# Patient Record
Sex: FEMALE | Race: WHITE | Employment: FULL TIME | URBAN - METROPOLITAN AREA
[De-identification: names, ages, dates, MRNs, and addresses within clinical notes are randomized per-mention and may not be internally consistent; named-entity substitution may affect disease eponyms.]

---
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
## 2021-11-11 ENCOUNTER — HOSPITAL ENCOUNTER (EMERGENCY)
Age: 21
Discharge: HOME | End: 2021-11-11
Attending: EMERGENCY MEDICINE

## 2021-11-11 ENCOUNTER — APPOINTMENT (OUTPATIENT)
Dept: GENERAL RADIOLOGY | Age: 21
End: 2021-11-11
Attending: PHYSICIAN ASSISTANT

## 2021-11-11 VITALS
SYSTOLIC BLOOD PRESSURE: 125 MMHG | HEART RATE: 87 BPM | TEMPERATURE: 98 F | RESPIRATION RATE: 16 BRPM | OXYGEN SATURATION: 100 % | DIASTOLIC BLOOD PRESSURE: 69 MMHG

## 2021-11-11 DIAGNOSIS — R07.89 CHEST WALL PAIN: ICD-10-CM
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
## 2021-11-11 LAB — HCG UR QL: NEGATIVE

## 2021-11-11 PROCEDURE — 99284 EMERGENCY DEPT VISIT MOD MDM: CPT

## 2021-11-11 PROCEDURE — 81025 URINE PREGNANCY TEST: CPT

## 2021-11-11 PROCEDURE — 93005 ELECTROCARDIOGRAM TRACING: CPT

## 2021-11-11 PROCEDURE — 71046 X-RAY EXAM CHEST 2 VIEWS: CPT

## 2021-11-11 RX ORDER — NAPROXEN 500 MG/1
500 TABLET ORAL
Qty: 20 TABLET | Refills: 0 | Status: SHIPPED | OUTPATIENT
Start: 2021-11-11
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
## 2021-11-12 LAB
ATRIAL RATE: 85 BPM
CALCULATED P AXIS, ECG09: 43 DEGREES
CALCULATED R AXIS, ECG10: 63 DEGREES
CALCULATED T AXIS, ECG11: 47 DEGREES
DIAGNOSIS, 93000: NORMAL
P-R INTERVAL, ECG05: 144 MS
Q-T INTERVAL, ECG07: 364 MS
QRS DURATION, ECG06: 86 MS
QTC CALCULATION (BEZET), ECG08: 433 MS
VENTRICULAR RATE, ECG03: 85 BPM

## 2021-11-21 ENCOUNTER — APPOINTMENT (OUTPATIENT)
Dept: GENERAL RADIOLOGY | Age: 21
End: 2021-11-21
Attending: EMERGENCY MEDICINE

## 2021-11-21 ENCOUNTER — HOSPITAL ENCOUNTER (EMERGENCY)
Age: 21
Discharge: HOME | End: 2021-11-21
Attending: EMERGENCY MEDICINE

## 2021-11-21 VITALS
HEIGHT: 61 IN | BODY MASS INDEX: 22.68 KG/M2 | DIASTOLIC BLOOD PRESSURE: 75 MMHG | SYSTOLIC BLOOD PRESSURE: 132 MMHG | OXYGEN SATURATION: 99 % | RESPIRATION RATE: 16 BRPM | TEMPERATURE: 98.6 F | WEIGHT: 120.15 LBS | HEART RATE: 73 BPM

## 2021-11-21 DIAGNOSIS — R07.89 CHEST WALL PAIN: Primary | ICD-10-CM

## 2021-11-21 DIAGNOSIS — R05.9 COUGH: ICD-10-CM

## 2021-11-21 PROCEDURE — 96374 THER/PROPH/DIAG INJ IV PUSH: CPT

## 2021-11-21 PROCEDURE — 99283 EMERGENCY DEPT VISIT LOW MDM: CPT

## 2021-11-21 PROCEDURE — 74011250637 HC RX REV CODE- 250/637: Performed by: EMERGENCY MEDICINE

## 2021-11-21 PROCEDURE — 71046 X-RAY EXAM CHEST 2 VIEWS: CPT

## 2021-11-21 PROCEDURE — 74011250636 HC RX REV CODE- 250/636: Performed by: EMERGENCY MEDICINE

## 2021-11-21 PROCEDURE — 93005 ELECTROCARDIOGRAM TRACING: CPT

## 2021-11-21 RX ORDER — AZITHROMYCIN 250 MG/1
TABLET, FILM COATED ORAL
Qty: 6 TABLET | Refills: 0 | Status: SHIPPED | OUTPATIENT
Start: 2021-11-21 | End: 2021-11-26

## 2021-11-21 RX ORDER — BENZONATATE 100 MG/1
100 CAPSULE ORAL
Qty: 15 CAPSULE | Refills: 0 | Status: SHIPPED | OUTPATIENT
Start: 2021-11-21 | End: 2021-11-26

## 2021-11-21 RX ORDER — ALBUTEROL SULFATE 90 UG/1
2 AEROSOL, METERED RESPIRATORY (INHALATION)
Qty: 18 G | Refills: 0 | Status: SHIPPED | OUTPATIENT
Start: 2021-11-21

## 2021-11-21 RX ORDER — BENZONATATE 100 MG/1
100 CAPSULE ORAL
Status: COMPLETED | OUTPATIENT
Start: 2021-11-21 | End: 2021-11-21

## 2021-11-21 RX ORDER — KETOROLAC TROMETHAMINE 30 MG/ML
15 INJECTION, SOLUTION INTRAMUSCULAR; INTRAVENOUS ONCE
Status: COMPLETED | OUTPATIENT
Start: 2021-11-21 | End: 2021-11-21

## 2021-11-21 RX ADMIN — KETOROLAC TROMETHAMINE 15 MG: 30 INJECTION, SOLUTION INTRAMUSCULAR; INTRAVENOUS at 21:53

## 2021-11-21 RX ADMIN — BENZONATATE 100 MG: 100 CAPSULE ORAL at 21:53

## 2021-11-22 LAB
ATRIAL RATE: 86 BPM
CALCULATED P AXIS, ECG09: 40 DEGREES
CALCULATED R AXIS, ECG10: 62 DEGREES
CALCULATED T AXIS, ECG11: 72 DEGREES
DIAGNOSIS, 93000: NORMAL
P-R INTERVAL, ECG05: 136 MS
Q-T INTERVAL, ECG07: 372 MS
QRS DURATION, ECG06: 82 MS
QTC CALCULATION (BEZET), ECG08: 445 MS
VENTRICULAR RATE, ECG03: 86 BPM
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
## 2021-11-22 NOTE — DISCHARGE INSTRUCTIONS
Chest x-ray today did not show pneumonia. Since you have had a cough for more than a week, please take Zithromax which is an antibiotic. Also use the albuterol inhaler as needed for wheezing. Thank you.
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
## 2021-12-16 ENCOUNTER — HOSPITAL ENCOUNTER (EMERGENCY)
Age: 21
Discharge: HOME | End: 2021-12-16
Attending: STUDENT IN AN ORGANIZED HEALTH CARE EDUCATION/TRAINING PROGRAM

## 2021-12-16 VITALS
TEMPERATURE: 98.2 F | HEIGHT: 61 IN | RESPIRATION RATE: 16 BRPM | HEART RATE: 97 BPM | SYSTOLIC BLOOD PRESSURE: 130 MMHG | OXYGEN SATURATION: 100 % | DIASTOLIC BLOOD PRESSURE: 86 MMHG | WEIGHT: 113 LBS | BODY MASS INDEX: 21.34 KG/M2

## 2021-12-16 DIAGNOSIS — Z72.0 TOBACCO ABUSE: ICD-10-CM

## 2021-12-16 DIAGNOSIS — I73.00 RAYNAUD'S DISEASE WITHOUT GANGRENE: ICD-10-CM

## 2021-12-16 DIAGNOSIS — I73.9 PERIPHERAL ARTERY VASOSPASM (HCC): Primary | ICD-10-CM

## 2021-12-16 PROCEDURE — 99282 EMERGENCY DEPT VISIT SF MDM: CPT

## 2021-12-16 NOTE — ED PROVIDER NOTES
22year-old with history of anxiety, ongoing tobacco abuse, presenting with tingling in her feet and hands. States that these symptoms have been ongoing for multiple years but she became more concerned today. States that she intermittently has tingling of her fingertips and toes, usually when exposed to cold. She denies persistent tingling. No pain. Denies palpitations, chest pain or lightheadedness. No fevers or chills. No trauma. Past Medical History:   Diagnosis Date    Concussion     Constipation     Dental disorder     Irritable bowel syndrome     Murmur     Psychiatric disorder     PTSD    Psychiatric disorder     Anxiety    Suicide attempt ([facility] [address]     Vision decreased        No past surgical history on file.       Family History:   Problem Relation Age of Onset    OSTEOARTHRITIS Maternal Grandmother     Headache Maternal Grandmother     Migraines Maternal Grandmother     Psychiatric Disorder Paternal Grandmother     Heart Disease Other     Alcohol abuse Other     Asthma Neg Hx     Bleeding Prob Neg Hx     Cancer Neg Hx     Diabetes Neg Hx     Elevated Lipids Neg Hx     Hypertension Neg Hx     Lung Disease Neg Hx     Stroke Neg Hx     Mental Retardation Neg Hx        Social History     Socioeconomic History    Marital status: SINGLE     Spouse name: Not on file    Number of children: Not on file    Years of education: Not on file    Highest education level: Not on file   Occupational History    Not on file   Tobacco Use    Smoking status: Current Every Day Smoker     Packs/day: 0.50    Smokeless tobacco: Former User   Substance and Sexual Activity    Alcohol use: No    Drug use: Yes     Types: Marijuana    Sexual activity: Not on file   Other Topics Concern    Not on file   Social History Narrative    Not on file     Social Determinants of Health     Financial Resource Strain:     Difficulty of Paying Living Expenses: Not on file   Food Insecurity:     Worried About Running Out of Food in the Last Year: Not on file    Ran Out of Food in the Last Year: Not on file   Transportation Needs:     Lack of Transportation (Medical): Not on file    Lack of Transportation (Non-Medical): Not on file   Physical Activity:     Days of Exercise per Week: Not on file    Minutes of Exercise per Session: Not on file   Stress:     Feeling of Stress : Not on file   Social Connections:     Frequency of Communication with Friends and Family: Not on file    Frequency of Social Gatherings with Friends and Family: Not on file    Attends Protestant Services: Not on file    Active Member of [address] [place] [address] [employer] or Organizations: Not on file    Attends Club or Organization Meetings: Not on file    Marital Status: Not on file   Intimate Partner Violence:     Fear of Current or Ex-Partner: Not on file    Emotionally Abused: Not on file    Physically Abused: Not on file    Sexually Abused: Not on file   Housing Stability:     Unable to Pay for Housing in the Last Year: Not on file    Number of [place] in the Last Year: Not on file    Unstable Housing in the Last Year: Not on file         ALLERGIES: Patient has no known allergies. Review of Systems   Constitutional: Negative for chills, fatigue and fever. Eyes: Negative for photophobia. Respiratory: Negative for cough and shortness of breath. Cardiovascular: Negative for chest pain and leg swelling. Gastrointestinal: Negative for abdominal pain, nausea and vomiting. Genitourinary: Negative for dysuria. Musculoskeletal: Negative for back pain. Neurological: Negative for light-headedness and headaches. Psychiatric/Behavioral: Negative for confusion. All other systems reviewed and are negative. Vitals:    12/16/21 1733   BP: 130/86   Pulse: 97   Resp: 16   Temp: 98.2 °F (36.8 °C)   SpO2: 100%   Weight: 51.3 kg (113 lb)   Height: 5' 1\" (1.549 m)            Physical Exam  Vitals and nursing note reviewed.    Constitutional: General: She is not in acute distress. Appearance: She is well-developed. She is not toxic-appearing. HENT:      Head: Normocephalic and atraumatic. Mouth/Throat:      Mouth: Mucous membranes are moist.      Pharynx: Oropharynx is clear. No oropharyngeal exudate. Eyes:      Extraocular Movements: Extraocular movements intact. Pupils: Pupils are equal, round, and reactive to light. Cardiovascular:      Rate and Rhythm: Normal rate and regular rhythm. Pulses:           Dorsalis pedis pulses are 2+ on the right side and 2+ on the left side. Posterior tibial pulses are 2+ on the right side and 2+ on the left side. Heart sounds: Normal heart sounds. Pulmonary:      Effort: Pulmonary effort is normal. No respiratory distress. Breath sounds: Normal breath sounds. Chest:      Chest wall: No tenderness. Abdominal:      General: There is no distension. Palpations: Abdomen is soft. Tenderness: There is no abdominal tenderness. There is no guarding or rebound. Musculoskeletal:         General: No deformity. Normal range of motion. Cervical back: Normal range of motion. Right lower leg: No edema. Left lower leg: No edema. Comments: Entire spine palpated, no tenderness or deformity. Skin:     General: Skin is warm and dry. Capillary Refill: Capillary refill takes less than 2 seconds. Neurological:      General: No focal deficit present. Mental Status: She is alert and oriented to person, place, and time. Cranial Nerves: Cranial nerves are intact. Motor: Motor function is intact. Coordination: Coordination is intact. Gait: Gait is intact.       Comments: 5/5 abduction of shoulders bilateral (C5,C6)  5/5 flexion (C5,C6) /extension (C7,8) at elbows bilaterally  5/5 wrist extension bilateral (C6)  5/5 wrist flexion bilateral (C7)  5/5  strength bilateral (C8)  5/5 abduction of fingers bilateral (C8,TI)  5/5 flexion/extension  hips bilateral  5/5 knee flexion/extension bilateral   5/5 foot flexion/extension bilateral        Psychiatric:         Mood and Affect: Mood normal.          MDM     . MEDICAL DECISION MAKIN y.o. female presents with Tingling    Differential diagnosis includes but not limited to: Raynaud's, possibly peripheral vasospasm or occlusion although normal exam and neurological exam    LABORATORY TESTS:  Labs Reviewed - No data to display    IMAGING RESULTS:  No orders to display       MEDICATIONS GIVEN:  Medications - No data to display      IMPRESSION:  1. Peripheral artery vasospasm (HCC)    2. Raynaud's disease without gangrene    3. Tobacco abuse        PLAN:  -   Discharge  Current Discharge Medication List        Follow-up Information     Follow up With Specialties Details Why Ricardo Fox MD Pediatric Medicine Schedule an appointment as soon as possible for a visit  Call for a follow up appointment. [address] [facility]  [phone]          Return precautions given      Tamara Ziegler MD          Please note that this dictation was completed with [employer], the computer voice recognition software. Quite often unanticipated grammatical, syntax, homophones, and other interpretive errors are inadvertently transcribed by the computer software. Please disregard these errors. Please excuse any errors that have escaped final proofreading.     Procedures

## 2021-12-16 NOTE — ED TRIAGE NOTES
Patient arrives reporting that for the past year she has had intermitent tingling and pain in both her hands and feet, worse at night and when its cold. Pt states it has been worsening over the past 3 days.

## 2022-12-31 NOTE — DISCHARGE INSTRUCTIONS
I have reviewed and confirmed nurses' notes... Metatarsal Fracture in Children: Care Instructions  Your Care Instructions    A metatarsal fracture is a break or a thin, hairline crack in one of the metatarsal bones of the foot. This type of fracture usually happens from repeated stress on the bones of the foot. Or it can happen when a person jumps or changes direction quickly and twists his or her foot or ankle the wrong way. This fracture is common among dancers because their work involves a lot of jumping, and balancing and turning on one foot. Treatment depends on how bad the fracture is and where the fracture is on the bone. Your child may or may not have had surgery. Your doctor may have put your child's foot in a cast or splint to keep it stable. Your child may have been given crutches to use to keep weight off his or her foot. A metatarsal fracture may take from 6 weeks to several months to heal. It is important to give your child's foot time to heal completely, so that he or she does not hurt it again. Do not let your child return to usual activities until your doctor says he or she can. Your doctor may suggest that your child get physical therapy to help regain strength and range of motion in the foot. Healthy habits can help your child heal. Give your child a variety of healthy foods. And don't smoke around him or her. Follow-up care is a key part of your child's treatment and safety. Be sure to make and go to all appointments, and call your doctor if your child is having problems. It's also a good idea to know your child's test results and keep a list of the medicines your child takes. How can you care for your child at home? · Be safe with medicines. Read and follow all instructions on the label. ¨ If your doctor gave your child a prescription medicine for pain, give it as prescribed. ¨ If your child is not taking a prescription pain medicine, ask your doctor if he or she can take an over-the-counter medicine.   · Follow your doctor's instructions about how much weight your child can put on the foot and when your child can go back to his or her usual activities. If your child was given crutches, be sure he or she uses them as directed. · Put ice or a cold pack on your child's foot for 10 to 20 minutes at a time. Try to do this every 1 to 2 hours for the next 3 days (when your child is awake) or until the swelling goes down. Put a thin cloth between the ice and your child's skin. · Prop up your child's foot on a pillow when you ice it or anytime your child sits or lies down for the next 3 days. Try to keep it above the level of your child's heart. This will help reduce swelling. Cast and splint care  · If your child's foot is in a cast or splint, follow the cast or splint care instructions your doctor gives you. If your child has a removable fiberglass walking cast or a splint, do not take it off unless your doctor tells you to. · Keep the cast or splint dry. If your child has a removable fiberglass walking cast or a splint, ask your doctor if it is okay to remove it to bathe. Your doctor may want your child to keep it on as much as possible. · If you are told to keep your child's cast or splint on, tape a sheet of plastic to cover it when he or she bathes. Or ask your doctor about products that can help keep a cast or splint dry. Water under the cast or splint can cause the skin to itch and hurt. · Never cut the cast or let your child stick anything down it to scratch an itch on the leg. When should you call for help? Call your doctor now or seek immediate medical care if:  · Your child has problems with his or her cast or splint. For example:  ¨ The skin under the cast or splint is burning or stinging. ¨ The cast or splint feels too tight. ¨ There is a lot of swelling near the cast or splint. (Some swelling is normal.)  ¨ Your child has a new fever. ¨ There is drainage or a bad smell coming from the cast or splint.   · Your child has increased or severe pain. · Your child has tingling, weakness, or numbness in the foot and toes. · Your child cannot move his or her toes. · Your child's foot turns cold or changes color. Watch closely for changes in your child's health, and be sure to contact your doctor if:  · The pain does not get better day by day. · Your child does not get better as expected. Where can you learn more? Go to http://gay-michael.info/. Enter T706 in the search box to learn more about \"Metatarsal Fracture in Children: Care Instructions. \"  Current as of: March 21, 2017  Content Version: 11.3  © 0218-9910 [employer]. Care instructions adapted under license by [employer] (which disclaims liability or warranty for this information). If you have questions about a medical condition or this instruction, always ask your healthcare professional. [address] any warranty or liability for your use of this information.

## 2023-05-22 RX ORDER — NAPROXEN 500 MG/1
500 TABLET ORAL
COMMUNITY
Start: 2021-11-11

## 2023-05-22 RX ORDER — ALBUTEROL SULFATE 90 UG/1
2 AEROSOL, METERED RESPIRATORY (INHALATION) EVERY 4 HOURS PRN
COMMUNITY
Start: 2021-11-21

## 2023-08-29 ENCOUNTER — OFFICE VISIT (OUTPATIENT)
Age: 23
End: 2023-08-29

## 2023-08-29 VITALS
SYSTOLIC BLOOD PRESSURE: 126 MMHG | BODY MASS INDEX: 24 KG/M2 | DIASTOLIC BLOOD PRESSURE: 78 MMHG | TEMPERATURE: 98.5 F | HEART RATE: 85 BPM | OXYGEN SATURATION: 96 % | WEIGHT: 127 LBS

## 2023-08-29 DIAGNOSIS — K59.00 CONSTIPATION, UNSPECIFIED CONSTIPATION TYPE: ICD-10-CM

## 2023-08-29 DIAGNOSIS — R39.9 UTI SYMPTOMS: Primary | ICD-10-CM

## 2023-08-29 DIAGNOSIS — R10.30 LOWER ABDOMINAL PAIN: ICD-10-CM

## 2023-08-29 LAB
BILIRUBIN, URINE, POC: NEGATIVE
BLOOD URINE, POC: NEGATIVE
GLUCOSE URINE, POC: NEGATIVE
KETONES, URINE, POC: NEGATIVE
LEUKOCYTE ESTERASE, URINE, POC: NEGATIVE
NITRITE, URINE, POC: NEGATIVE
PH, URINE, POC: 7.5 (ref 4.6–8)
PROTEIN,URINE, POC: NEGATIVE
SPECIFIC GRAVITY, URINE, POC: 1.02 (ref 1–1.03)
URINALYSIS CLARITY, POC: CLEAR
URINALYSIS COLOR, POC: YELLOW
UROBILINOGEN, POC: NORMAL

## 2023-08-29 ASSESSMENT — ENCOUNTER SYMPTOMS
CONSTIPATION: 1
ABDOMINAL PAIN: 1

## 2023-08-29 NOTE — PATIENT INSTRUCTIONS
Results for orders placed or performed in visit on 08/29/23   AMB POC URINALYSIS DIP STICK AUTO W/ MICRO   Result Value Ref Range    Color (UA POC) Yellow     Clarity (UA POC) Clear     Glucose, Urine, POC Negative Negative    Bilirubin, Urine, POC Negative Negative    Ketones, Urine, POC Negative Negative    Specific Gravity, Urine, POC 1.020 1.001 - 1.035    Blood (UA POC) Negative Negative    pH, Urine, POC 7.5 4.6 - 8.0    Protein, Urine, POC Negative Negative    Urobilinogen, POC 0.2 mg/dL     Nitrite, Urine, POC Negative Negative    Leukocyte Esterase, Urine, POC Negative Negative     High fiber diet and lots of water  Use Miralax powder 17 gm  8 Oz of water - may take daily x7 days and then alternate day     Add Senakot if no good BM in 1-2 days

## 2023-08-29 NOTE — PROGRESS NOTES
Normal appearance. She is not ill-appearing. Abdominal:      General: Abdomen is flat. Bowel sounds are normal. There is no distension. Palpations: Abdomen is soft. Tenderness: There is abdominal tenderness (mild on deep palpation - with fullness) in the right lower quadrant and suprapubic area. There is no right CVA tenderness, left CVA tenderness or guarding. Comments: Area of fullness in lower abdomen      Neurological:      Mental Status: She is alert. 1. UTI symptoms  -     AMB POC URINALYSIS DIP STICK AUTO W/ MICRO  2. Lower abdominal pain  Comments:  RLQ and Suprapubic pain   3. Constipation, unspecified constipation type     High fiber diet and lots of water  Use Miralax powder 17 gm  8 Oz of water - may take daily x 7 days and then alternate day     Add Senakot if no good BM in 1-2 days       Results for orders placed or performed in visit on 08/29/23   AMB POC URINALYSIS DIP STICK AUTO W/ MICRO   Result Value Ref Range    Color (UA POC) Yellow     Clarity (UA POC) Clear     Glucose, Urine, POC Negative Negative    Bilirubin, Urine, POC Negative Negative    Ketones, Urine, POC Negative Negative    Specific Gravity, Urine, POC 1.020 1.001 - 1.035    Blood (UA POC) Negative Negative    pH, Urine, POC 7.5 4.6 - 8.0    Protein, Urine, POC Negative Negative    Urobilinogen, POC 0.2 mg/dL     Nitrite, Urine, POC Negative Negative    Leukocyte Esterase, Urine, POC Negative Negative     The patients condition was discussed with the patient and they understand. The patient is to follow up with primary care doctor. If signs and symptoms become worse the pt is to go to the ER. The patient is to take medications as prescribed.

## 2024-02-12 ENCOUNTER — HOSPITAL ENCOUNTER (EMERGENCY)
Facility: HOSPITAL | Age: 24
Discharge: HOME | End: 2024-02-12
Attending: STUDENT IN AN ORGANIZED HEALTH CARE EDUCATION/TRAINING PROGRAM
Payer: MEDICAID

## 2024-02-12 VITALS
HEART RATE: 96 BPM | TEMPERATURE: 98.6 F | SYSTOLIC BLOOD PRESSURE: 131 MMHG | BODY MASS INDEX: 24.31 KG/M2 | OXYGEN SATURATION: 97 % | WEIGHT: 128.75 LBS | RESPIRATION RATE: 18 BRPM | HEIGHT: 61 IN | DIASTOLIC BLOOD PRESSURE: 84 MMHG

## 2024-02-12 DIAGNOSIS — F41.9 ANXIETY: Primary | ICD-10-CM

## 2024-02-12 PROCEDURE — 99283 EMERGENCY DEPT VISIT LOW MDM: CPT

## 2024-02-12 RX ORDER — HYDROXYZINE HYDROCHLORIDE 25 MG/1
25 TABLET, FILM COATED ORAL EVERY 8 HOURS PRN
Qty: 30 TABLET | Refills: 0 | Status: SHIPPED | OUTPATIENT
Start: 2024-02-12 | End: 2024-02-22

## 2024-02-12 NOTE — ED TRIAGE NOTES
Pt reports she has \"always been anxious her whole life\" and she saw a new therapist today for anxiety and was advised to come to the ED to anxiety medicine to hold her over until she can get an appointment with a PCP. Pt reports she was also just recently able to get medicaid. Pt denies SI or HI.

## 2024-02-12 NOTE — DISCHARGE INSTRUCTIONS
We are sending a prescription for Atarax/hydroxyzine to your pharmacy.  Please take as prescribed.  If symptoms worsen or new concerning symptoms arise, please report to the nearest emergency department.    Thank you for allowing us to provide you with medical care today.  We realize that you have many choices for your emergency care needs.  We thank you for choosing [facility].  Please choose us in the future for any continued health care needs.     The exam and treatment you received in the Emergency Department were for an emergent problem and are not intended as complete care. It is important that you follow up with a doctor, nurse practitioner, or physician's assistant for ongoing care. If your symptoms worsen or you do not improve as expected and you are unable to reach your usual health care provider, you should return to the Emergency Department.  We are available 24 hours a day.     Please make an appointment with your health care provider(s) for follow up of your Emergency Department visit.  Take this sheet with you when you go to your follow-up visit.

## 2024-02-12 NOTE — ED PROVIDER NOTES
[facility]  EMERGENCY DEPARTMENT ENCOUNTER      Pt Name: Kaylee Berrios  MRN: [id number]  Birthdate 2000  Date of evaluation: 2/12/2024  Provider: Heber Jordan PA-C    CHIEF COMPLAINT       Chief Complaint   Patient presents with    Anxiety         HISTORY OF PRESENT ILLNESS   (Location/Symptom, Timing/Onset, Context/Setting, Quality, Duration, Modifying Factors, Severity)  Note limiting factors.   23-year-old female reports to ED upon therapist referral for anxiety medicine until she can get in with a PCP.  Reports \"always being anxious her whole life,\" and saw her new therapist today.  Denies SI/HI, changes in her anxious state, shortness of breath, chest pain, or N/V/D.              Review of External Medical Records:     Nursing Notes were reviewed.    REVIEW OF SYSTEMS    (2-9 systems for level 4, 10 or more for level 5)     Review of Systems   Constitutional:  Negative for activity change and appetite change.   Gastrointestinal: Negative.    Neurological: Negative.    Psychiatric/Behavioral:  Negative for hallucinations.        Except as noted above the remainder of the review of systems was reviewed and negative.       PAST MEDICAL HISTORY     Past Medical History:   Diagnosis Date    Concussion     Constipation     Dental disorder     Irritable bowel syndrome     Murmur     Psychiatric disorder     Anxiety    Psychiatric disorder     PTSD    Suicide attempt (HCC)     Vision decreased          SURGICAL HISTORY     History reviewed. No pertinent surgical history.      CURRENT MEDICATIONS       Discharge Medication List as of 2/12/2024  6:20 PM        CONTINUE these medications which have NOT CHANGED    Details   albuterol sulfate HFA (PROVENTIL;VENTOLIN;PROAIR) 108 (90 Base) MCG/ACT inhaler Inhale 2 puffs into the lungs every 4 hours as neededHistorical Med      naproxen (NAPROSYN) 500 MG tablet Take 1 tablet by mouthHistorical Med             ALLERGIES     Patient has no known

## 2024-02-16 ENCOUNTER — HOSPITAL ENCOUNTER (EMERGENCY)
Facility: HOSPITAL | Age: 24
Discharge: HOME | End: 2024-02-16
Attending: EMERGENCY MEDICINE
Payer: MEDICAID

## 2024-02-16 ENCOUNTER — APPOINTMENT (OUTPATIENT)
Facility: HOSPITAL | Age: 24
End: 2024-02-16
Payer: MEDICAID

## 2024-02-16 VITALS
HEART RATE: 71 BPM | OXYGEN SATURATION: 96 % | TEMPERATURE: 98.1 F | RESPIRATION RATE: 18 BRPM | DIASTOLIC BLOOD PRESSURE: 94 MMHG | SYSTOLIC BLOOD PRESSURE: 137 MMHG

## 2024-02-16 DIAGNOSIS — R11.2 NAUSEA VOMITING AND DIARRHEA: Primary | ICD-10-CM

## 2024-02-16 DIAGNOSIS — R19.7 NAUSEA VOMITING AND DIARRHEA: Primary | ICD-10-CM

## 2024-02-16 LAB
ALBUMIN SERPL-MCNC: 4.5 G/DL (ref 3.5–5)
ALBUMIN/GLOB SERPL: 1.1 (ref 1.1–2.2)
ALP SERPL-CCNC: 43 U/L (ref 45–117)
ALT SERPL-CCNC: 15 U/L (ref 12–78)
ANION GAP SERPL CALC-SCNC: 15 MMOL/L (ref 5–15)
APPEARANCE UR: CLEAR
AST SERPL-CCNC: 15 U/L (ref 15–37)
BACTERIA URNS QL MICRO: ABNORMAL /HPF
BASOPHILS # BLD: 0 K/UL (ref 0–0.1)
BASOPHILS NFR BLD: 0 % (ref 0–1)
BILIRUB SERPL-MCNC: 0.8 MG/DL (ref 0.2–1)
BILIRUB UR QL: NEGATIVE
BUN SERPL-MCNC: 8 MG/DL (ref 6–20)
BUN/CREAT SERPL: 11 (ref 12–20)
CALCIUM SERPL-MCNC: 8.9 MG/DL (ref 8.5–10.1)
CHLORIDE SERPL-SCNC: 102 MMOL/L (ref 97–108)
CO2 SERPL-SCNC: 25 MMOL/L (ref 21–32)
COLOR UR: ABNORMAL
CREAT SERPL-MCNC: 0.72 MG/DL (ref 0.55–1.02)
DIFFERENTIAL METHOD BLD: ABNORMAL
EOSINOPHIL # BLD: 0.2 K/UL (ref 0–0.4)
EOSINOPHIL NFR BLD: 1 % (ref 0–7)
EPITH CASTS URNS QL MICRO: ABNORMAL /LPF
ERYTHROCYTE [DISTWIDTH] IN BLOOD BY AUTOMATED COUNT: 11.6 % (ref 11.5–14.5)
GLOBULIN SER CALC-MCNC: 4.1 G/DL (ref 2–4)
GLUCOSE SERPL-MCNC: 170 MG/DL (ref 65–100)
GLUCOSE UR STRIP.AUTO-MCNC: NEGATIVE MG/DL
HCG SERPL-ACNC: <1 MIU/ML (ref 0–6)
HCT VFR BLD AUTO: 41.9 % (ref 35–47)
HGB BLD-MCNC: 14 G/DL (ref 11.5–16)
HGB UR QL STRIP: ABNORMAL
IMM GRANULOCYTES # BLD AUTO: 0 K/UL
IMM GRANULOCYTES NFR BLD AUTO: 0 %
KETONES UR QL STRIP.AUTO: >80 MG/DL
LEUKOCYTE ESTERASE UR QL STRIP.AUTO: NEGATIVE
LIPASE SERPL-CCNC: 24 U/L (ref 13–75)
LYMPHOCYTES # BLD: 0.6 K/UL (ref 0.8–3.5)
LYMPHOCYTES NFR BLD: 3 % (ref 12–49)
MAGNESIUM SERPL-MCNC: 1.5 MG/DL (ref 1.6–2.4)
MCH RBC QN AUTO: 30.3 PG (ref 26–34)
MCHC RBC AUTO-ENTMCNC: 33.4 G/DL (ref 30–36.5)
MCV RBC AUTO: 90.7 FL (ref 80–99)
MONOCYTES # BLD: 1 K/UL (ref 0–1)
MONOCYTES NFR BLD: 5 % (ref 5–13)
MUCOUS THREADS URNS QL MICRO: ABNORMAL /LPF
NEUTS BAND NFR BLD MANUAL: 9 % (ref 0–6)
NEUTS SEG # BLD: 18.2 K/UL (ref 1.8–8)
NEUTS SEG NFR BLD: 82 % (ref 32–75)
NITRITE UR QL STRIP.AUTO: NEGATIVE
NRBC # BLD: 0 K/UL (ref 0–0.01)
NRBC BLD-RTO: 0 PER 100 WBC
PH UR STRIP: 5.5 (ref 5–8)
PLATELET # BLD AUTO: 338 K/UL (ref 150–400)
PMV BLD AUTO: 11.8 FL (ref 8.9–12.9)
POTASSIUM SERPL-SCNC: 3.4 MMOL/L (ref 3.5–5.1)
PROT SERPL-MCNC: 8.6 G/DL (ref 6.4–8.2)
PROT UR STRIP-MCNC: NEGATIVE MG/DL
RBC # BLD AUTO: 4.62 M/UL (ref 3.8–5.2)
RBC #/AREA URNS HPF: ABNORMAL /HPF (ref 0–5)
RBC MORPH BLD: ABNORMAL
SODIUM SERPL-SCNC: 142 MMOL/L (ref 136–145)
SP GR UR REFRACTOMETRY: >1.03 (ref 1–1.03)
URINE CULTURE IF INDICATED: ABNORMAL
UROBILINOGEN UR QL STRIP.AUTO: 0.2 EU/DL (ref 0.2–1)
WBC # BLD AUTO: 20 K/UL (ref 3.6–11)
WBC URNS QL MICRO: ABNORMAL /HPF (ref 0–4)

## 2024-02-16 PROCEDURE — 2580000003 HC RX 258

## 2024-02-16 PROCEDURE — 80053 COMPREHEN METABOLIC PANEL: CPT

## 2024-02-16 PROCEDURE — 83735 ASSAY OF MAGNESIUM: CPT

## 2024-02-16 PROCEDURE — 85025 COMPLETE CBC W/AUTO DIFF WBC: CPT

## 2024-02-16 PROCEDURE — 96375 TX/PRO/DX INJ NEW DRUG ADDON: CPT

## 2024-02-16 PROCEDURE — 81001 URINALYSIS AUTO W/SCOPE: CPT

## 2024-02-16 PROCEDURE — 84702 CHORIONIC GONADOTROPIN TEST: CPT

## 2024-02-16 PROCEDURE — 99285 EMERGENCY DEPT VISIT HI MDM: CPT

## 2024-02-16 PROCEDURE — 74177 CT ABD & PELVIS W/CONTRAST: CPT

## 2024-02-16 PROCEDURE — 96365 THER/PROPH/DIAG IV INF INIT: CPT

## 2024-02-16 PROCEDURE — 6360000002 HC RX W HCPCS

## 2024-02-16 PROCEDURE — 36415 COLL VENOUS BLD VENIPUNCTURE: CPT

## 2024-02-16 PROCEDURE — 83690 ASSAY OF LIPASE: CPT

## 2024-02-16 PROCEDURE — 6360000004 HC RX CONTRAST MEDICATION: Performed by: EMERGENCY MEDICINE

## 2024-02-16 PROCEDURE — 96361 HYDRATE IV INFUSION ADD-ON: CPT

## 2024-02-16 RX ORDER — MAGNESIUM SULFATE 1 G/100ML
1000 INJECTION INTRAVENOUS ONCE
Status: COMPLETED | OUTPATIENT
Start: 2024-02-16 | End: 2024-02-16

## 2024-02-16 RX ORDER — 0.9 % SODIUM CHLORIDE 0.9 %
1000 INTRAVENOUS SOLUTION INTRAVENOUS ONCE
Status: COMPLETED | OUTPATIENT
Start: 2024-02-16 | End: 2024-02-16

## 2024-02-16 RX ORDER — ONDANSETRON 4 MG/1
4 TABLET, ORALLY DISINTEGRATING ORAL 3 TIMES DAILY PRN
Qty: 21 TABLET | Refills: 0 | Status: SHIPPED | OUTPATIENT
Start: 2024-02-16

## 2024-02-16 RX ORDER — ONDANSETRON 2 MG/ML
4 INJECTION INTRAMUSCULAR; INTRAVENOUS
Status: COMPLETED | OUTPATIENT
Start: 2024-02-16 | End: 2024-02-16

## 2024-02-16 RX ORDER — DROPERIDOL 2.5 MG/ML
0.62 INJECTION, SOLUTION INTRAMUSCULAR; INTRAVENOUS EVERY 6 HOURS PRN
Status: DISCONTINUED | OUTPATIENT
Start: 2024-02-16 | End: 2024-02-16 | Stop reason: HOSPADM

## 2024-02-16 RX ADMIN — IOPAMIDOL 100 ML: 755 INJECTION, SOLUTION INTRAVENOUS at 19:06

## 2024-02-16 RX ADMIN — DROPERIDOL 0.62 MG: 2.5 INJECTION, SOLUTION INTRAMUSCULAR; INTRAVENOUS at 18:47

## 2024-02-16 RX ADMIN — SODIUM CHLORIDE 1000 ML: 9 INJECTION, SOLUTION INTRAVENOUS at 17:37

## 2024-02-16 RX ADMIN — ONDANSETRON 4 MG: 2 INJECTION INTRAMUSCULAR; INTRAVENOUS at 17:37

## 2024-02-16 RX ADMIN — MAGNESIUM SULFATE HEPTAHYDRATE 1000 MG: 1 INJECTION, SOLUTION INTRAVENOUS at 20:07

## 2024-02-16 NOTE — ED PROVIDER NOTES
[facility] EMERGENCY CTR  EMERGENCY DEPARTMENT ENCOUNTER      Pt Name: Kaylee Berrios  MRN: [id number]  Birthdate 2000  Date of evaluation: 2/16/2024  Provider: Doe Gomez PA-C    CHIEF COMPLAINT       Chief Complaint   Patient presents with    Nausea & Vomiting         HISTORY OF PRESENT ILLNESS   (Location/Symptom, Timing/Onset, Context/Setting, Quality, Duration, Modifying Factors, Severity)  Note limiting factors.   23-year-old female with a past medical history of anxiety presents with complaint of nausea, vomiting, and diarrhea that started today.  Patient states that she has had numerous episodes of vomiting today.  She has felt extremely nauseous.  Reports that she has felt very close to passing out numerous times today, however denies any syncopal episodes.  Diarrhea without blood.  No recent antibiotics.  Denies fever, chest pain, shortness of breath, urinary symptoms.  Denies alcohol use, however admits to every day marijuana use.            Review of External Medical Records:     Nursing Notes were reviewed.    REVIEW OF SYSTEMS    (2-9 systems for level 4, 10 or more for level 5)     Review of Systems    Except as noted above the remainder of the review of systems was reviewed and negative.       PAST MEDICAL HISTORY     Past Medical History:   Diagnosis Date    Concussion     Constipation     Dental disorder     Irritable bowel syndrome     Murmur     Psychiatric disorder     Anxiety    Psychiatric disorder     PTSD    Suicide attempt (HCC)     Vision decreased          SURGICAL HISTORY     History reviewed. No pertinent surgical history.      CURRENT MEDICATIONS       Previous Medications    ALBUTEROL SULFATE HFA (PROVENTIL;VENTOLIN;PROAIR) 108 (90 BASE) MCG/ACT INHALER    Inhale 2 puffs into the lungs every 4 hours as needed    HYDROXYZINE HCL (ATARAX) 25 MG TABLET    Take 1 tablet by mouth every 8 hours as needed for Itching    NAPROXEN (NAPROSYN) 500 MG TABLET    Take 1 tablet by mouth

## 2024-02-17 NOTE — ED NOTES
[facility] Nursing Note        Patient Name: Kaylee Berrios      : 2000             MRN: [id number]      Chief Complaint: Nausea & Vomiting      Admit Diagnosis: No admission diagnoses are documented for this encounter.      Surgery: * No surgery found *            MD/RN reviewed discharge instructions and options with patient. Patient verbalized understanding. RN reviewed discharge instructions using teach back method. Patient ambulatory to exit without difficulty and no acute signs of distress. No complaints or needs expressed at this time. Patient counseled on medications prescribed at discharge (If prescribed). Vital signs stable. Patient to follow up with PCP/Specialist on the next business day for appointment. All questions answered by ER RN.          Lines:        Vitals: Patient Vitals for the past 12 hrs:   Temp Pulse Resp BP SpO2   24 -- -- -- -- 96 %   24 2045 98.1 °F (36.7 °C) 71 18 (!) 137/94 99 %   24 -- -- -- 123/72 98 %   24 1645 -- -- -- -- 100 %   24 1630 98 °F (36.7 °C) 74 16 129/74 100 %         Signed by: Amador Lee RN, DINORAH, BSN, CMSRN                                              2024 at 9:08 PM

## 2024-02-26 ENCOUNTER — HOSPITAL ENCOUNTER (EMERGENCY)
Facility: HOSPITAL | Age: 24
Discharge: HOME | End: 2024-02-27
Attending: EMERGENCY MEDICINE
Payer: MEDICAID

## 2024-02-26 DIAGNOSIS — S09.90XA INJURY OF HEAD, INITIAL ENCOUNTER: Primary | ICD-10-CM

## 2024-02-26 DIAGNOSIS — F41.1 ANXIETY STATE: ICD-10-CM

## 2024-02-26 PROCEDURE — 99284 EMERGENCY DEPT VISIT MOD MDM: CPT

## 2024-02-26 ASSESSMENT — PAIN DESCRIPTION - LOCATION: LOCATION: HEAD

## 2024-02-26 ASSESSMENT — PAIN DESCRIPTION - ORIENTATION: ORIENTATION: RIGHT

## 2024-02-26 ASSESSMENT — PAIN DESCRIPTION - FREQUENCY: FREQUENCY: INTERMITTENT

## 2024-02-26 ASSESSMENT — PAIN SCALES - GENERAL: PAINLEVEL_OUTOF10: 2

## 2024-02-26 ASSESSMENT — PAIN DESCRIPTION - PAIN TYPE: TYPE: ACUTE PAIN

## 2024-02-26 ASSESSMENT — PAIN - FUNCTIONAL ASSESSMENT: PAIN_FUNCTIONAL_ASSESSMENT: 0-10

## 2024-02-26 ASSESSMENT — PAIN DESCRIPTION - DESCRIPTORS: DESCRIPTORS: ACHING

## 2024-02-27 ENCOUNTER — APPOINTMENT (OUTPATIENT)
Facility: HOSPITAL | Age: 24
End: 2024-02-27
Payer: MEDICAID

## 2024-02-27 VITALS
RESPIRATION RATE: 16 BRPM | WEIGHT: 120.37 LBS | BODY MASS INDEX: 22.73 KG/M2 | DIASTOLIC BLOOD PRESSURE: 92 MMHG | SYSTOLIC BLOOD PRESSURE: 122 MMHG | TEMPERATURE: 98.8 F | HEIGHT: 61 IN | HEART RATE: 94 BPM | OXYGEN SATURATION: 100 %

## 2024-02-27 VITALS
DIASTOLIC BLOOD PRESSURE: 95 MMHG | RESPIRATION RATE: 17 BRPM | HEART RATE: 85 BPM | SYSTOLIC BLOOD PRESSURE: 144 MMHG | TEMPERATURE: 98.6 F | HEIGHT: 61 IN | BODY MASS INDEX: 24.55 KG/M2 | WEIGHT: 130 LBS | OXYGEN SATURATION: 100 %

## 2024-02-27 DIAGNOSIS — R00.2 PALPITATIONS: ICD-10-CM

## 2024-02-27 DIAGNOSIS — Z76.0 ENCOUNTER FOR MEDICATION REFILL: ICD-10-CM

## 2024-02-27 DIAGNOSIS — F41.1 ANXIETY STATE: Primary | ICD-10-CM

## 2024-02-27 LAB — HCG UR QL: NEGATIVE

## 2024-02-27 PROCEDURE — 81025 URINE PREGNANCY TEST: CPT

## 2024-02-27 PROCEDURE — 70450 CT HEAD/BRAIN W/O DYE: CPT

## 2024-02-27 PROCEDURE — 99283 EMERGENCY DEPT VISIT LOW MDM: CPT

## 2024-02-27 RX ORDER — LORAZEPAM 0.5 MG/1
0.5 TABLET ORAL EVERY 8 HOURS PRN
Qty: 12 TABLET | Refills: 0 | Status: SHIPPED | OUTPATIENT
Start: 2024-02-27 | End: 2024-03-01

## 2024-02-27 RX ORDER — ALBUTEROL SULFATE 90 UG/1
2 AEROSOL, METERED RESPIRATORY (INHALATION) 4 TIMES DAILY PRN
Qty: 18 G | Refills: 0 | Status: SHIPPED | OUTPATIENT
Start: 2024-02-27

## 2024-02-27 ASSESSMENT — ENCOUNTER SYMPTOMS
DOUBLE VISION: 0
EYE DISCHARGE: 0
CHEST TIGHTNESS: 0
FACIAL SWELLING: 0
DIFFICULTY BREATHING: 0
BLURRED VISION: 0
VOMITING: 0
NAUSEA: 0
ABDOMINAL PAIN: 0
DIARRHEA: 0
BACK PAIN: 0
COUGH: 0
SORE THROAT: 0
SHORTNESS OF BREATH: 0
EYE PAIN: 0

## 2024-02-27 NOTE — ED TRIAGE NOTES
Triage note:patient has history anxiety and depression is having anxiety today.hit her head today and feeling nausea has not vomited.denies wanting to hurt herself or other.

## 2024-02-27 NOTE — ED PROVIDER NOTES
[facility] EMERGENCY CTR  EMERGENCY DEPARTMENT ENCOUNTER      Pt Name: Kaylee Berrios  MRN: [id number]  Birthdate 2000  Date of evaluation: 2/26/2024  Provider: Javid Petty MD    CHIEF COMPLAINT       Chief Complaint   Patient presents with    Head Injury         HISTORY OF PRESENT ILLNESS   (Location/Symptom, Timing/Onset, Context/Setting, Quality, Duration, Modifying Factors, Severity)  Note limiting factors.   23-year-old female with head injury earlier today.  She struck her self in the head a couple times while very anxious and upset.  She does not have suicidal or homicidal ideation.  She denies headache and some nausea.  She is otherwise stable in the ER.  No vision or hearing changes.      Head Injury  Location:  Frontal  Pain details:     Quality:  Aching and dull    Severity:  No pain    Timing:  Constant    Progression:  Worsening  Chronicity:  New  Relieved by:  Nothing  Worsened by:  Nothing  Ineffective treatments:  None tried  Associated symptoms: no blurred vision, no difficulty breathing, no disorientation, no double vision, no headaches, no hearing loss, no loss of consciousness, no memory loss, no nausea and no vomiting          Review of External Medical Records:     Nursing Notes were reviewed.    REVIEW OF SYSTEMS    (2-9 systems for level 4, 10 or more for level 5)     Review of Systems   Constitutional:  Negative for activity change, appetite change, chills and diaphoresis.   HENT:  Negative for congestion, ear pain, facial swelling, hearing loss and sore throat.    Eyes:  Negative for blurred vision, double vision, pain, discharge and visual disturbance.   Respiratory:  Negative for cough, chest tightness and shortness of breath.    Cardiovascular:  Negative for chest pain and palpitations.   Gastrointestinal:  Negative for abdominal pain, diarrhea, nausea and vomiting.   Endocrine: Negative for cold intolerance, heat intolerance, polydipsia and polyphagia.   Genitourinary:

## 2024-02-27 NOTE — ED PROVIDER NOTES
[facility] EMERGENCY DEPT  EMERGENCY DEPARTMENT ENCOUNTER       Pt Name: Kaylee Berrios  MRN: [id number]  Birthdate 2000  Date of evaluation: 2/27/2024  Provider: Lico Gramajo MD   PCP: No primary care provider on file.  Note Started: 4:02 PM EST 2/27/24     CHIEF COMPLAINT       Chief Complaint   Patient presents with    Mental Health Problem     Pt arrives ambulatory to triage, reports she has been SOB and having panic attacks with arm and hand numbness x 1 week. Pt also reports nausea at times. Reports she has had to call 911 while driving. Pt reports she takes Hydroxyzine PRN for anxiety. Coming to ED to see if she can be admitted for resources. Denies SI/HI        HISTORY OF PRESENT ILLNESS: 1 or more elements      History From: patient, History limited by: none     Kyalee Berrios is a 23 y.o. female with a history of anxiety presents the emergency department chief complaint of panic attack.      Patient reports she has a history of anxiety.  She reports she was seen in the [facility] department yesterday for this, saw [facility] and was discharged with resources.  Patient reports over the past month she has had increasing difficulty with anxiety and frequent panic attacks.  She reports she begins to feel anxiety associated with increased work of breathing and paresthesias in her bilateral arms with palpitations.  She reports the symptoms are becoming disruptive to her life.    Patient denies any suicidal or homicidal ideation.  She saw her therapist today.  She is scheduled to see a psychiatrist in the coming weeks.  She states all the symptoms feel similar to her panic attacks in the past.     Please See MDM for Additional Details of the HPI/PMH  Nursing Notes were all reviewed and agreed with or any disagreements were addressed in the HPI.     REVIEW OF SYSTEMS        Positives and Pertinent negatives as per HPI.    PAST HISTORY     Past Medical History:  Past Medical History:   Diagnosis Date

## 2024-02-27 NOTE — [facility] NOTE
[facility] Progress Note:    Out patient resources for PHP, Warm line, and [place] crisis faxed over to [facility] ED.   
and cooperative. No grounds to seek TDO/ECO during this time. Writer updated ED, Provider Malinda who is in agreement with disposition.     MIESHA Love, Supervisee in Social Work, [facility]-A/C

## 2024-02-27 NOTE — DISCHARGE INSTRUCTIONS
Mental Health Resources around [facility]   [address], [place], [phone] Crisis [phone]  *[address], [place]  [phone] or Crisis [phone] Toll free [phone]  [facility] [address] [place], [phone] or [phone] for [place] residents  [place]   [address], [phone] Crisis [phone]  [place]   [address] [place], [phone] Crisis [phone]  [facility] Multiple locations in [place], [facility] [phone]   [facility] [address] [place] [address] [phone] Crisis [phone]  [facility] [address], [phone]     Other Mental Health Providers with Sliding Scale or Low/No Cost services  [facility] [address] [place] [address] [phone]  [facility] [address] [place], [phone]  [facility] [address], [phone]  [facility] [address], [phone]  [facility] [address], [phone]  [facility] [address], [phone]  [facility] [phone]    Psychiatrists and Nurse Practitioners   (Most of these practices also have therapists)    *[facility] [address] [facility] [address] [place]  [address] [phone]  [facility] [address], [phone]  [facility] [address] [place] [address] [phone]  *[facility] [address] [place], [place]  [phone]  [facility] Locations [address] [phone], [place] [phone], [place] [phone], and [place] [phone]  [facility] [place] office call [phone] [facility]

## 2024-02-27 NOTE — ED NOTES
Pt discharged by [facility]. Discharge instructions discussed and pt given opportunity to ask questions. Pt ambulatory out of ED

## 2024-03-07 ENCOUNTER — HOSPITAL ENCOUNTER (EMERGENCY)
Facility: HOSPITAL | Age: 24
Discharge: HOME | End: 2024-03-07
Attending: STUDENT IN AN ORGANIZED HEALTH CARE EDUCATION/TRAINING PROGRAM
Payer: MEDICAID

## 2024-03-07 VITALS
WEIGHT: 120.37 LBS | OXYGEN SATURATION: 100 % | SYSTOLIC BLOOD PRESSURE: 135 MMHG | HEART RATE: 95 BPM | TEMPERATURE: 98.4 F | HEIGHT: 61 IN | BODY MASS INDEX: 22.73 KG/M2 | DIASTOLIC BLOOD PRESSURE: 58 MMHG | RESPIRATION RATE: 16 BRPM

## 2024-03-07 DIAGNOSIS — F41.1 ANXIETY STATE: Primary | ICD-10-CM

## 2024-03-07 LAB
ALBUMIN SERPL-MCNC: 4.1 G/DL (ref 3.5–5)
ALBUMIN/GLOB SERPL: 1.1 (ref 1.1–2.2)
ALP SERPL-CCNC: 39 U/L (ref 45–117)
ALT SERPL-CCNC: 13 U/L (ref 12–78)
ANION GAP SERPL CALC-SCNC: 11 MMOL/L (ref 5–15)
APAP SERPL-MCNC: <2 UG/ML (ref 10–30)
APPEARANCE UR: CLEAR
AST SERPL-CCNC: 10 U/L (ref 15–37)
BARBITURATES UR QL SCN: NEGATIVE
BASOPHILS # BLD: 0.1 K/UL (ref 0–0.1)
BASOPHILS NFR BLD: 1 % (ref 0–1)
BENZODIAZ UR QL: NEGATIVE
BILIRUB SERPL-MCNC: 0.6 MG/DL (ref 0.2–1)
BILIRUB UR QL: NEGATIVE
BUN/CREAT SERPL: 7 (ref 12–20)
CALCIUM SERPL-MCNC: 8.6 MG/DL (ref 8.5–10.1)
CANNABINOIDS UR QL SCN: NEGATIVE
CO2 SERPL-SCNC: 27 MMOL/L (ref 21–32)
COCAINE UR QL SCN: NEGATIVE
COLOR UR: ABNORMAL
CREAT SERPL-MCNC: 0.69 MG/DL (ref 0.55–1.02)
DIFFERENTIAL METHOD BLD: ABNORMAL
EKG ATRIAL RATE: 71 BPM
EKG DIAGNOSIS: NORMAL
EKG P AXIS: 47 DEGREES
EKG P-R INTERVAL: 154 MS
EKG Q-T INTERVAL: 378 MS
EKG QRS DURATION: 84 MS
EKG QTC CALCULATION (BAZETT): 410 MS
EKG R AXIS: 65 DEGREES
EKG T AXIS: 46 DEGREES
EKG VENTRICULAR RATE: 71 BPM
EOSINOPHIL # BLD: 0.1 K/UL (ref 0–0.4)
EOSINOPHIL NFR BLD: 2 % (ref 0–7)
EPITH CASTS URNS QL MICRO: ABNORMAL /LPF
ERYTHROCYTE [DISTWIDTH] IN BLOOD BY AUTOMATED COUNT: 11.4 % (ref 11.5–14.5)
ETHANOL SERPL-MCNC: <10 MG/DL (ref 0–0.08)
GLOBULIN SER CALC-MCNC: 3.9 G/DL (ref 2–4)
GLUCOSE UR STRIP.AUTO-MCNC: NEGATIVE MG/DL
HCG UR QL: NEGATIVE
HCT VFR BLD AUTO: 39.8 % (ref 35–47)
HGB BLD-MCNC: 13.5 G/DL (ref 11.5–16)
IMM GRANULOCYTES # BLD AUTO: 0 K/UL (ref 0–0.04)
KETONES UR QL STRIP.AUTO: NEGATIVE MG/DL
LEUKOCYTE ESTERASE UR QL STRIP.AUTO: ABNORMAL
LYMPHOCYTES # BLD: 1.6 K/UL (ref 0.8–3.5)
LYMPHOCYTES NFR BLD: 26 % (ref 12–49)
Lab: NORMAL
MAGNESIUM SERPL-MCNC: 1.7 MG/DL (ref 1.6–2.4)
MCH RBC QN AUTO: 30.3 PG (ref 26–34)
MCHC RBC AUTO-ENTMCNC: 33.9 G/DL (ref 30–36.5)
MCV RBC AUTO: 89.4 FL (ref 80–99)
MONOCYTES # BLD: 0.8 K/UL (ref 0–1)
MONOCYTES NFR BLD: 13 % (ref 5–13)
NEUTS SEG NFR BLD: 58 % (ref 32–75)
NITRITE UR QL STRIP.AUTO: NEGATIVE
NRBC # BLD: 0 K/UL (ref 0–0.01)
NRBC BLD-RTO: 0 PER 100 WBC
OPIATES UR QL: NEGATIVE
PCP UR QL: NEGATIVE
PH UR STRIP: 6.5 (ref 5–8)
PLATELET # BLD AUTO: 270 K/UL (ref 150–400)
PMV BLD AUTO: 11.2 FL (ref 8.9–12.9)
POTASSIUM SERPL-SCNC: 3.5 MMOL/L (ref 3.5–5.1)
PROT SERPL-MCNC: 8 G/DL (ref 6.4–8.2)
PROT UR STRIP-MCNC: NEGATIVE MG/DL
RBC # BLD AUTO: 4.45 M/UL (ref 3.8–5.2)
SALICYLATES SERPL-MCNC: <1.7 MG/DL (ref 2.8–20)
SARS-COV-2 RNA RESP QL NAA+PROBE: NOT DETECTED
SODIUM SERPL-SCNC: 141 MMOL/L (ref 136–145)
SOURCE: NORMAL
SP GR UR REFRACTOMETRY: ABNORMAL (ref 1–1.03)
SPECIMEN HOLD: NORMAL
UROBILINOGEN UR QL STRIP.AUTO: 0.2 EU/DL (ref 0.2–1)
WBC # BLD AUTO: 5.9 K/UL (ref 3.6–11)
WBC URNS QL MICRO: ABNORMAL /HPF (ref 0–4)

## 2024-03-07 PROCEDURE — 83735 ASSAY OF MAGNESIUM: CPT

## 2024-03-07 PROCEDURE — 93005 ELECTROCARDIOGRAM TRACING: CPT | Performed by: STUDENT IN AN ORGANIZED HEALTH CARE EDUCATION/TRAINING PROGRAM

## 2024-03-07 PROCEDURE — 87635 SARS-COV-2 COVID-19 AMP PRB: CPT

## 2024-03-07 PROCEDURE — 81025 URINE PREGNANCY TEST: CPT

## 2024-03-07 PROCEDURE — 81001 URINALYSIS AUTO W/SCOPE: CPT

## 2024-03-07 PROCEDURE — 80179 DRUG ASSAY SALICYLATE: CPT

## 2024-03-07 PROCEDURE — 80143 DRUG ASSAY ACETAMINOPHEN: CPT

## 2024-03-07 PROCEDURE — 80053 COMPREHEN METABOLIC PANEL: CPT

## 2024-03-07 PROCEDURE — 85025 COMPLETE CBC W/AUTO DIFF WBC: CPT

## 2024-03-07 PROCEDURE — 99284 EMERGENCY DEPT VISIT MOD MDM: CPT

## 2024-03-07 PROCEDURE — 80307 DRUG TEST PRSMV CHEM ANLYZR: CPT

## 2024-03-07 PROCEDURE — 6370000000 HC RX 637 (ALT 250 FOR IP): Performed by: STUDENT IN AN ORGANIZED HEALTH CARE EDUCATION/TRAINING PROGRAM

## 2024-03-07 PROCEDURE — 36415 COLL VENOUS BLD VENIPUNCTURE: CPT

## 2024-03-07 PROCEDURE — 82077 ASSAY SPEC XCP UR&BREATH IA: CPT

## 2024-03-07 RX ORDER — LORAZEPAM 0.5 MG/1
0.5 TABLET ORAL DAILY
COMMUNITY

## 2024-03-07 RX ORDER — CEPHALEXIN 500 MG/1
500 CAPSULE ORAL 2 TIMES DAILY
Qty: 14 CAPSULE | Refills: 0 | Status: SHIPPED | OUTPATIENT
Start: 2024-03-07 | End: 2024-03-14

## 2024-03-07 RX ORDER — LORAZEPAM 0.5 MG/1
0.5 TABLET ORAL ONCE
Status: COMPLETED | OUTPATIENT
Start: 2024-03-07 | End: 2024-03-07

## 2024-03-07 RX ORDER — HYDROXYZINE HYDROCHLORIDE 10 MG/1
10 TABLET, FILM COATED ORAL
COMMUNITY

## 2024-03-07 RX ADMIN — LORAZEPAM 0.5 MG: 0.5 TABLET ORAL at 03:32

## 2024-03-07 ASSESSMENT — PAIN DESCRIPTION - FREQUENCY: FREQUENCY: CONTINUOUS

## 2024-03-07 ASSESSMENT — PAIN - FUNCTIONAL ASSESSMENT: PAIN_FUNCTIONAL_ASSESSMENT: NONE - DENIES PAIN

## 2024-03-07 NOTE — ED PROVIDER NOTES
EMERGENCY DEPARTMENT PHYSICIAN NOTE     Patient: Kaylee Berrios     Time of Service: 3/7/2024  3:08 AM     Chief complaint:   Chief Complaint   Patient presents with    Panic Attack        HISTORY:  Patient is a 23 y.o. female who presents to the emergency department with complaints of severe incapacitating anxiety.  Patient has had several ED visits over the last month and in the past for anxiety.  Most recently patient was at an outside ED and she was evaluated and given Ativan that has helped somewhat with her symptoms however patient cannot sit still.  She reports tachycardia and heart racing as well as numbness in her hands and arms when she becomes severely agitated and anxious.  She is awake and alert no acute distress.  Patient denies any true HI or SI.  She does state that she is hit herself in the head before.  Patient is frustrated as she was trying to get in to a outpatient facility and they said she had a space today but when time passed by they called back and said they no longer headspace for her.  Patient states she cannot live like this.  She had an appointment on the 18th of this month with psychiatry but they said that the psychiatrist has a family emergency and cannot see her until next month.  Patient states she is called every number she has on previous resources from [facility] to try and get help but she has been unable to get any help.  Patient is at risk for decompensation and harm based on her continued untreated anxiety and inability to get any true appropriate help besides emergency visits.  Vital signs stable.  Patient afebrile      Past Medical History:   Diagnosis Date    Concussion     Constipation     Dental disorder     Irritable bowel syndrome     Murmur     Psychiatric disorder     Anxiety    Psychiatric disorder     PTSD    Suicide attempt (HCC)     Vision decreased         History reviewed. No pertinent surgical history.     Family History   Problem Relation Age of Onset

## 2024-03-07 NOTE — ED TRIAGE NOTES
Triage note:anxiety attack this am history of anxiety would like to talk to behavorial health.denies any thoughts of self harm but did hit herself in the head 5 days ago.

## 2024-03-07 NOTE — DISCHARGE INSTRUCTIONS
You presented to ED with severe anxiety requesting hospital admission to a psychiatric unit.  You denied any suicidal thoughts or homicidal thoughts.  Based on your severe symptoms we consulted [facility] and attempt to get inpatient voluntary placement in a psychiatric unit for your anxiety.

## 2024-03-07 NOTE — ED NOTES
Patient stated she had to go home and could not wait to talk to [facility] left ambulatory waiting for ride in waiting room.

## 2024-03-13 ENCOUNTER — HOSPITAL ENCOUNTER (OUTPATIENT)
Facility: HOSPITAL | Age: 24
Setting detail: RECURRING SERIES
Discharge: HOME | End: 2024-03-16
Payer: MEDICAID

## 2024-03-13 VITALS
TEMPERATURE: 97.6 F | OXYGEN SATURATION: 98 % | SYSTOLIC BLOOD PRESSURE: 139 MMHG | HEART RATE: 107 BPM | DIASTOLIC BLOOD PRESSURE: 96 MMHG

## 2024-03-13 PROCEDURE — 90853 GROUP PSYCHOTHERAPY: CPT

## 2024-03-13 ASSESSMENT — ANXIETY QUESTIONNAIRES
5. BEING SO RESTLESS THAT IT IS HARD TO SIT STILL: 3
2. NOT BEING ABLE TO STOP OR CONTROL WORRYING: 3
7. FEELING AFRAID AS IF SOMETHING AWFUL MIGHT HAPPEN: 3
6. BECOMING EASILY ANNOYED OR IRRITABLE: 3
GAD7 TOTAL SCORE: 21
1. FEELING NERVOUS, ANXIOUS, OR ON EDGE: 3
4. TROUBLE RELAXING: 3
3. WORRYING TOO MUCH ABOUT DIFFERENT THINGS: 3

## 2024-03-13 ASSESSMENT — PATIENT HEALTH QUESTIONNAIRE - PHQ9
SUM OF ALL RESPONSES TO PHQ QUESTIONS 1-9: 13
SUM OF ALL RESPONSES TO PHQ QUESTIONS 1-9: 13
5. POOR APPETITE OR OVEREATING: 0
SUM OF ALL RESPONSES TO PHQ9 QUESTIONS 1 & 2: 3
SUM OF ALL RESPONSES TO PHQ QUESTIONS 1-9: 13
SUM OF ALL RESPONSES TO PHQ QUESTIONS 1-9: 13
3. TROUBLE FALLING OR STAYING ASLEEP: 3
8. MOVING OR SPEAKING SO SLOWLY THAT OTHER PEOPLE COULD HAVE NOTICED. OR THE OPPOSITE, BEING SO FIGETY OR RESTLESS THAT YOU HAVE BEEN MOVING AROUND A LOT MORE THAN USUAL: 0
6. FEELING BAD ABOUT YOURSELF - OR THAT YOU ARE A FAILURE OR HAVE LET YOURSELF OR YOUR FAMILY DOWN: 3
4. FEELING TIRED OR HAVING LITTLE ENERGY: 2
9. THOUGHTS THAT YOU WOULD BE BETTER OFF DEAD, OR OF HURTING YOURSELF: 0
7. TROUBLE CONCENTRATING ON THINGS, SUCH AS READING THE NEWSPAPER OR WATCHING TELEVISION: 2
1. LITTLE INTEREST OR PLEASURE IN DOING THINGS: 1
2. FEELING DOWN, DEPRESSED OR HOPELESS: 2
10. IF YOU CHECKED OFF ANY PROBLEMS, HOW DIFFICULT HAVE THESE PROBLEMS MADE IT FOR YOU TO DO YOUR WORK, TAKE CARE OF THINGS AT HOME, OR GET ALONG WITH OTHER PEOPLE: 1

## 2024-03-13 NOTE — SUICIDE SAFETY PLAN
SAFETY PLAN    A suicide Safety Plan is a document that supports someone when they are having thoughts of suicide.    Warning Signs that indicate a suicidal crisis may be developing: What (situations, thoughts, feelings, body sensations, behaviors, etc.) do you experience that lets you know you are beginning to think about suicide?  1. Racing heart,  2. Racing thoughts  3. Shortness of breath & numb hands    Internal Coping Strategies:  What things can I do (relaxation techniques, hobbies, physical activities, etc.) to take my mind off my problems without contacting another person?  1. Leave the situation  2. Smoke a cigarette  3. Hike or go to the [employer]    People and social settings that provide distraction: Who can I call or where can I go to distract me?  1. Name: Madison ceja)   Phone: [phone]  2. Name: Jose   Phone: In Phone   3. Place: [email or web address]            4. Place: Friends house  5. Place: [place]    People whom I can ask for help: Who can I call when I need help - for example, friends, family, clergy, someone else?  1. Name: Madison ceja)  Phone: [phone]  2. Name: Jose   Phone: In Phone   3. Name: Kimberly   Phone: [phone]    Professionals or Behavioral Health agencies I can contact during a crisis: Who can I call for help - for example, my doctor, my psychiatrist, my psychologist, a mental health provider, a suicide hotline?  1. Clinician Name: Sal Capellan   Phone: [phone]      Clinician Pager or Emergency Contact #: [phone]    2. Clinician Name: Johnny YUSUF   Phone: [phone]      Clinician Pager or Emergency Contact #: [phone]      3. Suicide Prevention Lifeline: 3-062-408-TALK (7367)    4. Local Behavioral Health Emergency Services -  for example, [facility], [facility] suicide hotline, [facility] Hotline: 988      Emergency Services Address: [address] [place] [address]       Emergency Services Phone: [phone]      Making

## 2024-03-13 NOTE — GROUP NOTE
Group Therapy Note    Date: 3/13/2024    Group Start Time: 12:00 PM  Group End Time:  1:00 PM  Group Topic: Psychotherapy    [facility] Joselyn Beard        Group Therapy Note    In the psychotherapy group, this writer presented on the process of grief, describing the normal grief process of acute grief escalating to integrated grief as well as complicated grief. This writer also reviewed the stages of grief including denial, anger, bargaining, depression and acceptance. This writer asked patients to reflect on their experience with the grief process and the different stages of grief. This writer supported patient processing through the use of CBT and strengths-based theory.     Attendees: 7        Engage in discussion on the grief     Notes:  The pt was present and engaged in the psychotherapy group, sharing her feelings with anger after the death of her best friend. This pt shared how she would become angry when she would be unable to same the share major life experiences as her. The pt will continue to process their experiences with grief in the future psychotherapy groups.    Status After Intervention:  Unchanged    Participation Level: Active Listener and Interactive    Participation Quality: Appropriate, Attentive, and Sharing      Speech:  normal      Thought Process/Content: Logical      Affective Functioning: Congruent      Mood: euthymic      Level of consciousness:  Attentive      Response to Learning: Able to verbalize current knowledge/experience, Able to verbalize/acknowledge new learning, Able to retain information, Capable of insight, and Progressing to goal      Endings: None Reported    Modes of Intervention: Education, Support, and Exploration      Discipline Responsible: /Counselor      Signature:  Joselyn Damian

## 2024-03-13 NOTE — BH NOTE
Writer called Dr. Nieto at 11:03 to inform him of pt's elevated pulse and BP. Dr. Nieto advised pt would be okay with no follow up and stated he would be in later today to meet with patients.    MIESHA Garrison  
Marital/Intimacy/Family [] Financial [x] Safety [x] Spiritual  [] Vocational/Academic [] Social [] Health [x] Recreational/Leisure    DISPOSITION    [x] Admitted to Program  [] Referred Elsewhere  [] Placed on Wait list    [] Other (comment)    Sal Capellan LCSW (social work)  3/13/2024 9:14 AM

## 2024-03-13 NOTE — GROUP NOTE
Group Therapy Note    Date: 3/13/2024    Group Start Time:  2:00 PM  Group End Time:  2:45 PM  Group Topic: Wrap-Up    [facility] Joselyn Beard        Group Therapy Note    In the wrap-up group, this writer had patients complete their behavioral check-in sheets to review any feelings of SI/HI. The writer then gathered patients outdoors to celebrate a graduating member. This writer also had patients briefly reflect on gratitude within their journals during this time. This group was co-facilitated by behavioral therapist Carina Garcia, who played a song during this time that was created for the graduating member by another patient. This facilitator asked patients to share their thoughts on the song and supported pt processing.     Attendees: 7       Patient's Goal:  Engage in [employer] activity for group member    Notes:  This pt was present and engaged in the wrap-up group, identifying no feelings of SI/HI on their behavioral wrap-up sheet. The pt was not talkative in the wrap-up group, however was engaged in journaling and supported group members during discussion. This pt will continue to support graduating group members in the future wrap-up groups.     Status After Intervention:  Unchanged    Participation Level: Active Listener and Interactive    Participation Quality: Appropriate and Attentive      Speech:  normal      Thought Process/Content: Logical      Affective Functioning: Congruent      Mood: euthymic      Level of consciousness:  Attentive      Response to Learning: Able to verbalize current knowledge/experience and Progressing to goal      Endings: None Reported    Modes of Intervention: Support and Activity      Discipline Responsible: /Counselor      Signature:  Joselyn Damian

## 2024-03-13 NOTE — GROUP NOTE
Group Therapy Note    Date: 3/13/2024    Group Start Time:  1:10 PM  Group End Time:  2:00 PM  Group Topic: Psychoeducation    [facility]    Nayana Barrett MSW        Group Therapy Note    Writer facilitated psychoeducation group. Writer continued education on relationship red and green flags. Writer supported engagement through encouraging pts to ask questions and identify what about the information stood out to them. Writer transitioned to supporting Ellen, a  from [facility], in presenting to the group. Writer asked follow up questions to support further learning from Ellen.    Attendees: 7       Patient's Goal:  learning and processing relationship red and green flags as well as engaging with the .    Notes:   Pt presented as attentive and tired as evidenced by body and facial language. Pt presented with appropriate eye contact. Pt interacted appropriately with the presenter. Pt will continue to work on learning and processing relationship red and green flags as well as engaging with the .    Status After Intervention:  Unchanged    Participation Level: Minimal    Participation Quality: Appropriate and Attentive      Speech:  normal      Thought Process/Content: Logical      Affective Functioning: Congruent      Mood: euthymic      Level of consciousness:  Oriented x4, Attentive, and Drowsy      Response to Learning: Able to verbalize current knowledge/experience and Progressing to goal      Endings: None Reported    Modes of Intervention: Education and Support      Discipline Responsible: /Counselor      Signature:  MIESHA RAMÍREZ

## 2024-03-13 NOTE — GROUP NOTE
Group Therapy Note    Date: 3/13/2024    Group Start Time: 10:40 AM  Group End Time: 11:30 AM  Group Topic: Cognitive Skills    [facility]    Sal Capellan LCSW        Group Therapy Note    The patient's were encouraged to participate in an ice breaker activity to welcome a new peer. The patients were encouraged to be attentive to educational materials on identifying red flags and green lights in relationships. The patients were encouraged to discuss their experiences in with red flags and green lights.     Attendees: 7       Patient's Goal: discuss red flags and green lights in relationships.       Notes: The patient participated in an ice breaker activity to welcome a new peer. The patient was attentive when reviewing educational materials on red flags and triggers. The patient participated in a discussion relating educational materials to personal life. The patient identified lack of reciprocity as a red flag in a relationship. The patient will continue to be open to educational materials during the cognitive skills group.     Status After Intervention:  Unchanged    Participation Level: Active Listener and Interactive    Participation Quality: Appropriate, Attentive, and Sharing      Speech:  normal      Thought Process/Content: Logical      Affective Functioning: Congruent      Mood: euthymic      Level of consciousness:  Alert, Oriented x4, and Attentive      Response to Learning: Able to verbalize current knowledge/experience and Capable of insight      Endings: None Reported    Modes of Intervention: Education and Activity      Discipline Responsible: /Counselor      Signature:  Sal Capellan LCSW

## 2024-03-14 ENCOUNTER — HOSPITAL ENCOUNTER (OUTPATIENT)
Facility: HOSPITAL | Age: 24
Setting detail: RECURRING SERIES
Discharge: HOME | End: 2024-03-17
Payer: MEDICAID

## 2024-03-14 VITALS
TEMPERATURE: 98.2 F | SYSTOLIC BLOOD PRESSURE: 123 MMHG | OXYGEN SATURATION: 100 % | DIASTOLIC BLOOD PRESSURE: 83 MMHG | HEART RATE: 101 BPM

## 2024-03-14 PROCEDURE — 90853 GROUP PSYCHOTHERAPY: CPT

## 2024-03-14 PROCEDURE — 90832 PSYTX W PT 30 MINUTES: CPT

## 2024-03-14 NOTE — GROUP NOTE
Group Therapy Note    Date: 3/14/2024    Group Start Time: 10:40 AM  Group End Time: 11:30 AM  Group Topic: Cognitive Skills    [facility]    Nayana Barrett MSW        Group Therapy Note    Writer facilitated cognitive skills group on automatic negative thoughts. Writer provided education on negative core beliefs and how they cause ANTs. Writer provided education on different treatments to address negative core beliefs and ANTs. Writer reviewed handout with pts on different ANTs and encouraged engagement through reflection questions and prompting pts to read from the handout.    Attendees: 5       Patient's Goal: na    Notes:  Pt was absent due to being pulled for individual    Status After Intervention:  na    Participation Level: None    Participation Quality: na      Speech:  na      Thought Process/Content: na      Affective Functioning: na      Mood: na      Level of consciousness:  na      Response to Learning: na      Endings: None reported    Modes of Intervention: na      Discipline Responsible: /Counselor      Signature:  MIESHA RAMÍREZ

## 2024-03-14 NOTE — GROUP NOTE
Group Therapy Note    Date: 3/14/2024    Group Start Time:  9:00 AM  Group End Time:  9:40 AM  Group Topic: Community Meeting    [facility]    Sal Capellan LCSW        Group Therapy Note    The patients were encouraged to complete a morning check in to assess mood and identify any safety issues. The patients were encouraged to reflect on previous evening and self-disclose.     Attendees: 6       Patient's Goal: Identify mood and discuss assessment         Notes: The patient completed the morning assessment and denied SI/HI. The patient allowed this writer to discuss answers to questions on their check in assessment to discuss. The patient shared that she is working on managing her anxiety. The patient will continue to identify mood and self-disclose in the community group.      Status After Intervention:  Unchanged    Participation Level: Active Listener and Interactive    Participation Quality: Appropriate, Attentive, Sharing, and Supportive      Speech:  normal      Thought Process/Content: Logical      Affective Functioning: Congruent      Mood: euthymic      Level of consciousness:  Alert, Oriented x4, and Attentive      Response to Learning: Able to verbalize current knowledge/experience, Capable of insight, and Progressing to goal      Endings: None Reported    Modes of Intervention: Support, Socialization, and Activity      Discipline Responsible: /Counselor      Signature:  Sal Capellan LCSW

## 2024-03-14 NOTE — GROUP NOTE
Group Therapy Note    Date: 3/14/2024    Group Start Time: 12:00 PM  Group End Time:  1:00 PM  Group Topic: Relaxation    [facility]    Sal Capellan LCSW        Group Therapy Note    The patients were encouraged to journal using inner child prompts.     Attendees: 5       Patient's Goal: participate in journaling activities.        Notes: The patient completed the journaling prompts and shared prompts with peers.  The patient will continue to participate in journaling activity in the grief group.     Status After Intervention:  Unchanged    Participation Level: Active Listener and Interactive    Participation Quality: Appropriate, Attentive, and Sharing      Speech:  normal      Thought Process/Content: Logical      Affective Functioning: Congruent      Mood: euthymic      Level of consciousness:  Alert, Oriented x4, and Attentive      Response to Learning: Able to verbalize current knowledge/experience, Capable of insight, and Progressing to goal      Endings: None Reported    Modes of Intervention: Support, Socialization, and Activity      Discipline Responsible: /Counselor      Signature:  Sal Capellan LCSW
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
## 2024-03-14 NOTE — GROUP NOTE
Group Therapy Note    Date: 3/14/2024    Group Start Time:  2:00 PM  Group End Time:  2:45 PM  Group Topic: Wrap-Up    [facility]    Nayana Barrett MSW        Group Therapy Note    This writer facilitated a community wrap up group focused on assessing for safety and coping skills practice. The writer facilitated a symptom and safety check in using the afternoon check in form. The writer facilitated the GLADS activity. The writer facilitated peer led meditation followed by mindfulness practice.    Attendees: 5       Patient's Goal:  disclosing safety concerns and practicing coping.      Notes:  Pt denied SI/HI/TIRSO on the check out form. Pt identified learning to be less judgemental in GLADS. Pt left over [facility] in to group due to feeling overwhelmed and spoke with another staff member.  Pt will continue to work on disclosing safety concerns and practicing coping.      Status After Intervention:  Unchanged    Participation Level: Active Listener and Interactive    Participation Quality: Appropriate and Attentive      Speech:  normal      Thought Process/Content: Logical      Affective Functioning: Congruent      Mood: anxious      Level of consciousness:  Alert, Oriented x4, and Attentive      Response to Learning: Able to verbalize current knowledge/experience and Progressing to goal      Endings: None Reported    Modes of Intervention: Support, Socialization, and Activity      Discipline Responsible: /Counselor      Signature:  MIESHA RAMÍREZ

## 2024-03-14 NOTE — GROUP NOTE
Group Therapy Note    Date: 3/14/2024    Group Start Time:  1:10 PM  Group End Time:  2:00 PM  Group Topic: Psychoeducation    [facility]    Sal Capellan LCSW        Group Therapy Note    The patients were provided education on the wise mind, and were encouraged to be attentive to educational materials and discuss worksheet on material.      Attendees: 4       Patient's Goal: be open to educational materials.        Notes: The patient was open to educational information and shared work on work sheet regarding wise mind. The patient will continue to be open to educational materials during the Psychoeducational role.     Status After Intervention:  Unchanged    Participation Level: Active Listener and Interactive    Participation Quality: Appropriate and Attentive      Speech:  normal      Thought Process/Content: Logical      Affective Functioning: Congruent      Mood: euthymic      Level of consciousness:  Alert, Oriented x4, and Attentive      Response to Learning: Able to verbalize current knowledge/experience, Capable of insight, and Progressing to goal      Endings: None Reported    Modes of Intervention: Education      Discipline Responsible: /Counselor      Signature:  Sal Capellan LCSW
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
## 2024-03-15 ENCOUNTER — HOSPITAL ENCOUNTER (OUTPATIENT)
Facility: HOSPITAL | Age: 24
Setting detail: RECURRING SERIES
Discharge: HOME | End: 2024-03-18
Payer: MEDICAID

## 2024-03-15 VITALS
OXYGEN SATURATION: 100 % | TEMPERATURE: 98 F | SYSTOLIC BLOOD PRESSURE: 140 MMHG | HEART RATE: 95 BPM | DIASTOLIC BLOOD PRESSURE: 100 MMHG

## 2024-03-15 PROCEDURE — 90853 GROUP PSYCHOTHERAPY: CPT

## 2024-03-15 NOTE — GROUP NOTE
Group Therapy Note    Date: 3/15/2024    Group Start Time: 10:40 AM  Group End Time: 11:30 AM  Group Topic: Cognitive Skills    [facility]    Nayana Barrett MSW        Group Therapy Note    Writer facilitated cognitive skills group on boundary circles. Writer provided education on boundaries and boundary circles. Writer provided a handout and encouraged pts to identify and reflect on their boundary circles. Writer encouraged pt feedback.    Attendees: 7       Patient's Goal:   understanding boundary circles.     Notes:  Pt presented as attentive as evidenced by appropriate eye contact. Pt arrived late but was present for most of group. Pt was observed to complete the reflective activity. Pt will continue to work on understanding boundary circles.     Status After Intervention:  Unchanged    Participation Level: Active Listener and Minimal    Participation Quality: Appropriate and Attentive      Speech:  normal      Thought Process/Content: Logical      Affective Functioning: Congruent      Mood: euthymic      Level of consciousness:  Alert, Oriented x4, and Attentive      Response to Learning: Progressing to goal      Endings: None Reported    Modes of Intervention: Education and Support      Discipline Responsible: /Counselor      Signature:  MIESHA RAMÍREZ

## 2024-03-15 NOTE — GROUP NOTE
Group Therapy Note    Date: 3/15/2024    Group Start Time:  1:10 PM  Group End Time:  2:00 PM  Group Topic: Psychoeducation    [facility]    Nayana Barrett MSW        Group Therapy Note    Writer facilitated a psychoeducation group focused on coping skill practice. Writer provided education on healthy catharsis exercises. Writer prompted pts to practice healthy catharsis by writing a letter about a situation which has caused them difficult emotions and then paint over the letter to transform it into something beautiful. Writer supported healthy socialization and group rapport building conversation during the activity.    Attendees: 7       Patient's Goal:   practicing catharsis for coping.    Notes:  Pt presented as attentive and engaged as evidenced by spontaneous participation. Pt was observed to engage appropriately in the catharsis activity. Pt engaged appropriately with peers and supported them in problem solving. Pt will continue to work on practicing catharsis for coping.    Status After Intervention:  Unchanged    Participation Level: Active Listener and Interactive    Participation Quality: Appropriate, Attentive, Sharing, and Supportive      Speech:  normal      Thought Process/Content: Logical      Affective Functioning: Congruent      Mood: euthymic      Level of consciousness:  Alert, Oriented x4, and Attentive      Response to Learning: Able to verbalize current knowledge/experience and Progressing to goal      Endings: None Reported    Modes of Intervention: Socialization and Activity      Discipline Responsible: /Counselor      Signature:  MIESHA RAMÍREZ     ----- Message from Soo Espino sent at 3/27/2017  3:43 PM CDT -----  Contact: father oly   Calling it a failed attempt at ingrown nail appt.   Wants info, surgery instead of injections  Call back  [phone]

## 2024-03-15 NOTE — GROUP NOTE
Group Therapy Note    Date: 3/15/2024    Group Start Time:  9:00 AM  Group End Time:  9:40 AM  Group Topic: Community Meeting    [facility]    Nayana Barrett MSW        Group Therapy Note    Writer facilitated the morning check in community group focused on evaluating presentation, assessing for safety, and processing recent events. SABA SchererW intern, observed for educational purposes and was introduced to the group. Writer facilitated a symptom and safety check in using the morning check in form.  Writer encouraged patients to share events in their lives, identify triggers for difficult emotions, and identify ways in which the patients perceive themselves to be making progress. Writer prompted and supported peer feedback.     Attendees: 6       Patient's Goal:  disclosing safety concerns and engaging in peer discussion and support.      Notes:  Pt denied SI/HI/TIRSO on the check in form. Pt interacted with peers appropriately and spontaneously around shared interests. Pt will continue to work on disclosing safety concerns and engaging in peer discussion and support.      Status After Intervention:  Unchanged    Participation Level: Active Listener and Interactive    Participation Quality: Appropriate and Attentive      Speech:  normal      Thought Process/Content: Logical      Affective Functioning: Congruent      Mood: euthymic      Level of consciousness:  Alert, Oriented x4, and Attentive      Response to Learning: Able to verbalize current knowledge/experience and Progressing to goal      Endings: None Reported    Modes of Intervention: Support and Socialization      Discipline Responsible: /Counselor      Signature:  MIESHA RAMÍREZ

## 2024-03-15 NOTE — GROUP NOTE
Group Therapy Note    Date: 3/15/2024    Group Start Time:  2:00 PM  Group End Time:  2:45 PM  Group Topic: Wrap-Up    [facility]    Sal Capellan LCSW        Group Therapy Note    This writer facilitated the wrap up group. The patients were encouraged to complete the wrap up assessment and review and update their safety plan. The patients were encouraged to participate in farewell activity to send off two discharging peers.The patients were encouraged to reflect on their time with the discharging peers.     Attendees: 7       Patient's Goal: Identify mood and participate in farewell activity.       Notes: The patient completed the wrap up assessment. The patient denied SI/HI on the wrap up assessment.  The patient participated in farewell activity and reflected on experience with discharging peer. The patient shared that her discharging peers have helped her feel welcome and she hopes to be as secure as her peers seem when she discharges. The patient will continue to identify mood in the wrap up group.     Status After Intervention:  Unchanged    Participation Level: Active Listener and Interactive    Participation Quality: Appropriate, Attentive, Sharing, and Supportive      Speech:  normal      Thought Process/Content: Logical      Affective Functioning: Congruent      Mood: euthymic      Level of consciousness:  Alert, Oriented x4, and Attentive      Response to Learning: Capable of insight and Progressing to goal      Endings: None Reported    Modes of Intervention: Socialization and Activity      Discipline Responsible: /Counselor      Signature:  Sal Capellan LCSW

## 2024-03-15 NOTE — GROUP NOTE
Group Therapy Note    Date: 3/15/2024    Group Start Time:  9:40 AM  Group End Time: 10:30 AM  Group Topic: Process Group - Outpatient    [facility]    Sal Capellan LCSW        Group Therapy Note    This writer facilitated the process group. The patinets were encouraged to discuss what they were wearing as the chose to dress up for a peers last day. The patients were encouraged to participate in a peer led mindfulness meditation.  The patients were encouraged to complete an narrative autobiography activity for reflection.     Attendees: 6       Patient's Goal: Reflect and complete the narrative exercise.        Notes: The patient participated in a discussion on what they were wearing and what it was like getting ready for the day. The patient participated in mindfulness meditation. The patient shared details of their autobiography. The patient took a break during the biography exercise and was unable to share.  The patient will continue to reflect during the process group.     Status After Intervention:  Unchanged    Participation Level: Active Listener and Interactive    Participation Quality: Appropriate, Attentive, and Sharing      Speech:  normal      Thought Process/Content: Logical      Affective Functioning: Congruent      Mood: euthymic      Level of consciousness:  Alert, Oriented x4, and Attentive      Response to Learning: Able to verbalize current knowledge/experience, Capable of insight, and Progressing to goal      Endings: None Reported    Modes of Intervention: Socialization and Activity      Discipline Responsible: /Counselor      Signature:  Sal Capellan LCSW     care team

## 2024-03-15 NOTE — GROUP NOTE
Group Therapy Note    Date: 3/15/2024    Group Start Time: 12:00 PM  Group End Time:  1:00 PM  Group Topic: Relaxation    [facility]    Sal Capellan LCSW        Group Therapy Note    The patients were encouraged to complete and reflect on a mindfulness scavenger hunt activity outdoors.     Attendees: 7       Patient's Goal: practice mindfulness and reflect on experience.       Notes: The patient participated in the mindfulness activity and reflected upon returning to the group room. The patient will continue to practice mindfulness during the treatment day.     Status After Intervention:  Unchanged    Participation Level: Active Listener and Interactive    Participation Quality: Appropriate, Attentive, and Supportive      Speech:  normal      Thought Process/Content: Logical      Affective Functioning: Congruent      Mood: euthymic      Level of consciousness:  Alert and Oriented x4      Response to Learning: Able to verbalize current knowledge/experience, Capable of insight, and Progressing to goal      Endings: None Reported    Modes of Intervention: Activity      Discipline Responsible: /Counselor      Signature:  Sal Capellan LCSW

## 2024-03-15 NOTE — BH NOTE
Active Problems:    * No active hospital problems. *  Resolved Problems:    * No resolved hospital problems. *      Plan:     Continue current care  No medication changes; pt currently declining SSRIs.  Disposition planning with social work    Signed By: Rachael Suggs MD     March 15, 2024

## 2024-03-18 ENCOUNTER — HOSPITAL ENCOUNTER (OUTPATIENT)
Facility: HOSPITAL | Age: 24
Setting detail: RECURRING SERIES
Discharge: HOME | End: 2024-03-21
Payer: MEDICAID

## 2024-03-18 VITALS
SYSTOLIC BLOOD PRESSURE: 121 MMHG | TEMPERATURE: 98.6 F | DIASTOLIC BLOOD PRESSURE: 96 MMHG | OXYGEN SATURATION: 100 % | HEART RATE: 85 BPM

## 2024-03-18 DIAGNOSIS — F43.10 PTSD (POST-TRAUMATIC STRESS DISORDER): Primary | ICD-10-CM

## 2024-03-18 PROCEDURE — 90853 GROUP PSYCHOTHERAPY: CPT

## 2024-03-18 NOTE — GROUP NOTE
Group Therapy Note    Date: 3/18/2024    Group Start Time:  1:10 PM  Group End Time:  2:00 PM  Group Topic: Psychoeducation    [facility]    Nayana Barrett MSW        Group Therapy Note    Writer facilitated psychoeducation group. Writer provided education on the human tendency to show a different \"version\" or \"mask\" of oneself to the world than how one sees or experiences oneself. Writer encouraged pts to drawn their own inner and outer masks and share with their peers. Writer encouraged pts to ask each other questions and provide feedback. Writer prompted pts to identify the difference between healthy and unhealthy \"masking\" of the self.    Attendees: 5       Patient's Goal:  na    Notes: Pt was absent for most of group due to emotional dysregulation from anxiety and meeting with staff for support.     Status After Intervention:  na    Participation Level: na    Participation Quality: na      Speech:  na      Thought Process/Content: na      Affective Functioning: na      Mood: na      Level of consciousness:  na      Response to Learning: na      Endings: None Reported    Modes of Intervention: na      Discipline Responsible: /Counselor      Signature:  MIESHA RAMÍREZ    
                                                                      Group Therapy Note    Date: 3/18/2024    Group Start Time:  9:40 AM  Group End Time: 10:30 AM  Group Topic: Process Group - Outpatient    [facility]    Nayana Barrett MSW        Group Therapy Note    Writer facilitated process group. Writer encouraged pts to share issues on which they want support with problem solving, issues they need to process with peers, or events they want to celebrate. Writer encouraged rapport building through identification of shared interests and experiences. Writer prompted and supported peer feedback. Writer provided validation and feedback as well as utilized open ended questions to support further exploration of topics.      Attendees: 9       Patient's Goal:   engaging with peers in discussion and feedback.      Notes: Pt presented as attentive and engaged as evidenced by spontaneous participation. 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 Pt demonstrated active listening skills and provided appropriate feedback to peers. Pt will continue to work on engaging with peers in discussion and feedback.      Status After Intervention:  Unchanged    Participation Level: Active Listener and Interactive    Participation Quality: Appropriate, Attentive, and Sharing      Speech:  normal      Thought Process/Content: Logical      Affective Functioning: Congruent      Mood: euthymic      Level of consciousness:  Alert, Oriented x4, and Attentive      Response to Learning: Able to verbalize current knowledge/experience and Progressing to goal      Endings: None Reported    Modes of Intervention: Support and Socialization      Discipline Responsible: /Counselor      Signature:  MIESHA RAMÍREZ    
                                                                      Group Therapy Note    Date: 3/18/2024    Group Start Time: 10:40 AM  Group End Time: 11:30 AM  Group Topic: Cognitive Skills    [facility]    Sal Capellan LCSW        Group Therapy Note    This writer facilitated the cognitive skills group. The patients were encouraged to participate in an ice breaker exercise to start the group and welcome a new peer. The patients were encouraged to participate in a love and kindness meditation as a coping strategy practice. The patients were provided educational materials on cognitive distortions and were asked to reflect on how cognitive distortions have affected their lives.       Attendees: 10       Patient's Goal: Discuss CBT      Notes: The patient participated in the ice breaker activity to welcome a new peer. The patients were encouraged to participate in a love and kindness meditation to start the group. The patients were provided educational materials on cognitive distortions and encouraged to reflect on their own experiences with cognitive distortions. The patient shared that she catastrophizes as a cognitive distortion.  The patient will continue to be open to CBT educational materials.      Status After Intervention:  Unchanged    Participation Level: Active Listener and Interactive    Participation Quality: Appropriate, Attentive, Sharing, and Supportive      Speech:  normal      Thought Process/Content: Logical      Affective Functioning: Congruent      Mood: euthymic      Level of consciousness:  Alert, Oriented x4, and Attentive      Response to Learning: Able to verbalize current knowledge/experience, Capable of insight, and Progressing to goal      Endings: None Reported    Modes of Intervention: Education and Socialization      Discipline Responsible: /Counselor      Signature:  Sal Capellan LCSW    
                                                                      Group Therapy Note    Date: 3/18/2024    Group Start Time: 12:00 PM  Group End Time:  1:00 PM  Group Topic: Psychoeducation    [facility]    Sal Capellan LCSW        Group Therapy Note    This writer facilitated the treatment planning group. The patients were provided information on treatment planning in PHP. The patients were encouraged to participate in a Questions and answer activity to encourage reflection. The patient will continue to be provided information on treatment planning during the treatment planning group.       Attendees: 7       Patient's Goal: Accept information on treatment planning      Notes: The patient was open and accepted educational materials on treatment planning. The patient was supportive of peers. The patient took a break during the group when the conversation focused on friendship.  The patient participated in reflection activity utilizing prompts.      Status After Intervention:  Unchanged    Participation Level: Active Listener and Interactive    Participation Quality: Appropriate, Attentive, Sharing, and Supportive      Speech:  normal      Thought Process/Content: Logical      Affective Functioning: Congruent      Mood: euthymic      Level of consciousness:  Alert, Oriented x4, and Attentive      Response to Learning: Able to verbalize current knowledge/experience, Capable of insight, and Progressing to goal      Endings: None Reported    Modes of Intervention: Education, Support, Socialization, and Exploration      Discipline Responsible: /Counselor      Signature:  Sal Capellan LCSW    
and Clarifying      Discipline Responsible: /Counselor      Signature:  Anna Laguna, MSW Student.

## 2024-03-18 NOTE — BH NOTE
0930 Writer called Dr Nieto and left a message requesting a call back about vitals.    1040 Writer called Dr Nieto back. Writer informed Dr Nieto of pt's elevated BP. Writer notified Dr Nieto pt denied physical symptoms and reported smoking cigarettes. Dr Nieto stated there was not a need for medical evaluation.    MIESHA Daley
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
## 2024-03-19 ENCOUNTER — HOSPITAL ENCOUNTER (OUTPATIENT)
Facility: HOSPITAL | Age: 24
Setting detail: RECURRING SERIES
Discharge: HOME | End: 2024-03-22
Payer: MEDICAID

## 2024-03-19 VITALS
HEART RATE: 93 BPM | TEMPERATURE: 98.2 F | OXYGEN SATURATION: 100 % | DIASTOLIC BLOOD PRESSURE: 89 MMHG | SYSTOLIC BLOOD PRESSURE: 121 MMHG

## 2024-03-19 PROBLEM — F43.10 PTSD (POST-TRAUMATIC STRESS DISORDER): Status: ACTIVE | Noted: 2024-03-19

## 2024-03-19 PROCEDURE — 90853 GROUP PSYCHOTHERAPY: CPT

## 2024-03-19 NOTE — GROUP NOTE
Group Therapy Note    Date: 3/19/2024    Group Start Time: 12:00 PM  Group End Time:  1:00 PM  Group Topic: Psychoeducation    [facility]    Nayana Barrett MSW        Group Therapy Note    Writer facilitated a trauma group. Writer provided pts with a brief outline of the plans for the group to support pts in knowing there would be help with regulation. Writer facilitated a healing the inner child meditation and prompted processing afterwards. Writer provided validation as well as education on grief, anger, radical acceptance, and meaning making as possible parts of a trauma healing journey. Writer transitioned to the shake it out exercise for regulation. Writer prompted reflection on responses to the exercise. Writer closed with a tapping video to guide pts through using tapping for coping.    Attendees: 9       Patient's Goal:  processing trauma and practicing coping skills.    Notes:  Pt presented as attentive as evidenced by appropriate eye contact. Pt was observed to use a fidget toy during the meditation. Pt presented as uncomfortable throughout group and reported having low blood sugar. Pt stepped out for a soda and returned. Pt did not engage in the regulation activities. Pt will continue to work on processing trauma and practicing coping skills.    Status After Intervention:  Unchanged    Participation Level: Minimal    Participation Quality: Appropriate and Attentive      Speech:  normal      Thought Process/Content: Logical      Affective Functioning: Congruent      Mood: depressed      Level of consciousness:  Alert, Oriented x4, and Attentive      Response to Learning: Able to verbalize current knowledge/experience and Progressing to goal      Endings: None Reported    Modes of Intervention: Support and Activity      Discipline Responsible: /Counselor      Signature:  MIESHA RAMÍREZ

## 2024-03-19 NOTE — PROGRESS NOTES
MEDICATION GROUP THERAPY PROGRESS NOTE      Kaylee Berrios was not present for medication group.    TOPIC: Medication safety    GROUP TIME: 1:10 - 2:00 PM Tuesday    PERSONAL GOAL FOR PARTICIPATION: To be present for group, participate in discussion, and answer patient-directed questions.    GOAL ORIENTATION: Personal    THERAPEUTIC INTERVENTIONS REVIEWED AND DISCUSSED: The following topics were presented: questions to ask your doctor when a new medication is prescribed, who to talk to if you have questions about medications, drug-drug interactions, effect of substances of abuse and alcohol on mental health, the importance of carrying an updated medication list and how to create your own medication list.    IMPRESSION OF PARTICIPATION: Did not attend       Karla Oliver RPH  Clinical Pharmacy Specialist, Behavioral Health  Desk: [phone] ([phone]  Pharmacy: [phone]

## 2024-03-19 NOTE — BH NOTE
PHP  Psychiatric Progress Note    Patient: Kaylee Berrios MRN: [id number]  SSN: [id number]    YOB: 2000  Age: 23 y.o.  Sex: female        Subjective:     Kaylee Berrios  reports feeling \"anxious\" and moods are \"up and down\".  Denies SI/HI/AH/VH.  No aggression or violence.  Appropriately interactive and aware. Tolerating medications well.  Eating well and sleeping \"OK\".    Objective:     Vitals:    03/19/24 0900   BP: 121/89   Pulse: 93   Temp: 98.2 °F (36.8 °C)   TempSrc: Oral   SpO2: 100%        Mental Status Exam:       Orientation person and day of week   Eye Contact appropriate   Appearance:  casually dressed   Thought Process: within normal limits   Thought Content no hallucinations and no delusions   Suicidal ideations none   Homicidal ideations none   Mood:  anxious   Affect:  mood-congruent           MEDICATIONS:  Current Outpatient Medications   Medication Sig    LORazepam (ATIVAN) 0.5 MG tablet Take 1 tablet by mouth daily. Max Daily Amount: 0.5 mg    hydrOXYzine HCl (ATARAX) 10 MG tablet Take 1 tablet by mouth nightly    albuterol sulfate HFA (VENTOLIN HFA) 108 (90 Base) MCG/ACT inhaler Inhale 2 puffs into the lungs 4 times daily as needed for Wheezing    ondansetron (ZOFRAN-ODT) 4 MG disintegrating tablet Take 1 tablet by mouth 3 times daily as needed for Nausea or Vomiting (Patient not taking: Reported on 3/7/2024)    albuterol sulfate HFA (PROVENTIL;VENTOLIN;PROAIR) 108 (90 Base) MCG/ACT inhaler Inhale 2 puffs into the lungs every 4 hours as needed    naproxen (NAPROSYN) 500 MG tablet Take 1 tablet by mouth (Patient not taking: Reported on 3/7/2024)     No current facility-administered medications for this encounter.      patient given opportunity to ask questions    Lab/Data Review:  All lab results for the last 24 hours reviewed.        Assessment:     Active Problems:    * No active hospital problems. *  Resolved Problems:    * No resolved hospital problems. *      Plan:

## 2024-03-19 NOTE — GROUP NOTE
Group Therapy Note    Date: 3/19/2024    Group Start Time:  9:40 AM  Group End Time: 10:30 AM  Group Topic: Process Group - Outpatient    [facility]    Carina Garcia MSW        Group Therapy Note    Attendees: 10    Writer facilitated process group this morning. Writer opened with an icebreaker for patients since two groups had come together. Writer asked patients what kind of cookie they would be. Following this, writer encouraged patients to share anything they had brought to the group for processing and facilitated resulting discussions.        Patient's Goal:   Participate in group therapy, build rapport, engage in open sharing and processing with peers.     Notes:  Patient engaged fairly in group this morning. She shared that she would be a chocolate chip cookie as it's her favorite, and shared a simple recipe for peanut butter cookies that she has made in the past for a family member. Patient presented as quiet and anxious after this, as evidenced by self soothing behaviors including rocking in her seat. Patient will continue with identified treatment goals in further groups.     Status After Intervention:  Unchanged    Participation Level: Active Listener and Interactive    Participation Quality: Appropriate and Attentive      Speech:  normal      Thought Process/Content: Logical      Affective Functioning: Congruent      Mood: anxious      Level of consciousness:  Alert and Oriented x4      Response to Learning: Able to verbalize current knowledge/experience, Capable of insight, and Progressing to goal      Endings: None Reported    Modes of Intervention: Support, Socialization, and Exploration      Discipline Responsible: /Counselor      Signature:  MIESHA Perez

## 2024-03-19 NOTE — GROUP NOTE
Group Therapy Note    Date: 3/19/2024    Group Start Time:  9:04 AM  Group End Time:  9:40 AM  Group Topic: Community Meeting    [facility]    Nayana Barrett MSW        Group Therapy Note    Writer facilitated the morning check in community group focused on evaluating presentation, assessing for safety, and processing recent events and emotions. Writer facilitated a symptom and safety check in using the morning check in form. Writer provided reminders and answered questions regarding the check in form. Writer encouraged patients to share events in their lives, identify triggers for difficult emotions, and identify ways in which the patients perceive themselves to be making progress. Writer prompted and supported peer feedback.    Attendees: 6       Patient's Goal: disclosing safety concerns and engaging in peer discussion and support.      Notes:  Pt denied SI/HI/TIRSO on the check in form. Pt interacted appropriately with peers at start of group. Pt presented as quiet for most of group. Pt will continue to work on disclosing safety concerns and engaging in peer discussion and support.      Status After Intervention:  Unchanged    Participation Level: Interactive and Minimal    Participation Quality: Appropriate and Attentive      Speech:  normal      Thought Process/Content: Logical      Affective Functioning: Congruent      Mood: anxious      Level of consciousness:  Alert, Oriented x4, and Attentive      Response to Learning: Able to verbalize current knowledge/experience and Progressing to goal      Endings: None Reported    Modes of Intervention: Support and Socialization      Discipline Responsible: /Counselor      Signature:  MIESHA RAMÍREZ

## 2024-03-19 NOTE — BH NOTE
OUTPATIENT PHYSICIAN PROGRESS NOTE      Chief Complaint/Symptoms/Impairments (as noted in Treatment Plan): DIA, PTSD    Criteria for Continued Treatment (check all that apply):   [x] Preventing Decompensation   [x] Improving Level of Functioning  [x] Reducing Isolative Behaviors  [] Understanding Diagnosis and Need for Medications  [] Improving Treatment/Medication Compliance  [] Confronting Denial of Illness  [] Stabilizing Level of Functioning  [] Improving Emotional/Social/Cognitive Functioning  [] Decreasing Frequency of Hospitalizations    Suicidal/Homicidal ideations:   [x] Absent  [] Present  [] Passive  [] Intent  [] Plan  [] Death Wishes      CURRENT CONDITION OF PATIENT & PROGRESS ON TREATMENT PLAN    Subjective (ongoing complaints by patient regarding symptom severity, presentation, affect, function, etc.):  Kaylee Berrios reports not having a good day due to feeling a little overwhelmed with groups topics that bring her down.  No aggression or violence.  Appropriately interactive and aware. Tolerating medications well.  Appetite is fair Eating and sleeping fairly           Behavior (side effects, changes in mental status, objective observations seen in individual sessions or by staff): Kaylee Berrios is calm cooperative, clear and coherent with speech of average rate volume and tone.  Moods are fair.  Affect is fair range. Appropriately dressed and groomed.  Denies SI/HI/AH/VH.  No aggression or violence.  Appropriately interactive and aware.  Insight is good and judgement is fairly good.        Assessment/Plan (functional improvement and/or ongoing impairment, response to treatment, plan for future ongoing treatment and medication management to include revisions): Continue current care  Review [facility] for further discussions later  Groups milieu and individual therapy  Medication modification as appropriate  Disposition planning with social work          [x] NO NEW Medications at this time.   [] New

## 2024-03-19 NOTE — GROUP NOTE
Group Therapy Note    Date: 3/19/2024    Group Start Time: 10:40 AM  Group End Time: 11:30 AM  Group Topic: Cognitive Skills    [facility]    Sal Capellan LCSW        Group Therapy Note    This writer facilitated the cognitive skills group. The patients were encouraged to participate in a affirmation and mindfulness meditation activity to start the group. The patients were encouraged to discuss ACT diffusion and fusion of self and thoughts. The patients were encouraged to complete a fusion and diffusion exercise following educational materials.     Attendees: 9       Patient's Goal:  Be open to educational materials on ACT, and complete ACT exercise       Notes: The patient participated in mindfulness meditation and affirmation exercise. The patient was open to educational materials on ACT. The patient presented distracted and with a low mood during the group. The patient will continue to be open to education during the cognitive skills group.     Status After Intervention:  Unchanged    Participation Level: Active Listener and Interactive    Participation Quality: Lethargic      Speech:  normal      Thought Process/Content: Logical      Affective Functioning: Congruent      Mood:  depressed      Level of consciousness:  Alert, Oriented x4, and Attentive      Response to Learning: Progressing to goal      Endings: None Reported    Modes of Intervention: Education, Exploration, and Activity      Discipline Responsible: /Counselor      Signature:  Sal Capellan LCSW

## 2024-03-19 NOTE — BH NOTE
The patient entered this writers office and expressed frustration over not thinking she would ever be able to share in groups. The patient and this writer worked on regulation and this writer listened to the patients frustrations. The patient was tearful and shared that she would like a therapy that is multiple times per week, but not a group because of her discomfort in a group setting. This writer provided the patient was resources for Gnosticism counseling as requested and will meet with the patient the following day to discuss treatment plan.     Sal Capellan LCSW.

## 2024-03-20 ENCOUNTER — HOSPITAL ENCOUNTER (OUTPATIENT)
Facility: HOSPITAL | Age: 24
Setting detail: RECURRING SERIES
Discharge: HOME | End: 2024-03-23
Payer: MEDICAID

## 2024-03-20 VITALS
DIASTOLIC BLOOD PRESSURE: 110 MMHG | SYSTOLIC BLOOD PRESSURE: 134 MMHG | TEMPERATURE: 98.2 F | HEART RATE: 93 BPM | OXYGEN SATURATION: 95 %

## 2024-03-20 PROCEDURE — 90853 GROUP PSYCHOTHERAPY: CPT

## 2024-03-20 PROCEDURE — 90832 PSYTX W PT 30 MINUTES: CPT
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
## 2024-03-20 NOTE — GROUP NOTE
Group Therapy Note    Date: 3/20/2024    Group Start Time:  1:10 PM  Group End Time:  2:00 PM  Group Topic: Psychoeducation    [facility]    Sal Capellan LCSW        Group Therapy Note    This writer facilitated the psycho education group. The patients were encouraged to journal and share journal entries about mindfulness.     Attendees: 5       Patient's Goal: Self- regulation.       Notes: Pt. Met with  as well as took time to self-regulate during the group.     Status After Intervention:  N/A    Participation Level: N/A    Participation Quality: N/A      Speech:  N/A      Thought Process/Content: N/A      Affective Functioning: N/A      Mood: N/A      Level of consciousness:  N/A      Response to Learning: N/A      Endings: N/A    Modes of Intervention: N/A      Discipline Responsible: /Counselor      Signature:  Sal Capellan LCSW    
                                                                      Group Therapy Note    Date: 3/20/2024    Group Start Time:  2:05 PM  Group End Time:  2:45 PM  Group Topic: Wrap-Up    [facility]    Nayana Barrett MSW        Group Therapy Note    Writer facilitated a community wrap up group focused on assessing for safety, coping skills practice, and planning ways to engage in healthy coping while in the community. Writer facilitated a symptom and safety check in using the afternoon check in form. Writer facilitated education on positive self-talk and prompted pts to identify and discuss affirmations they utilize. Writer supported pts who offered to share affirmation tools with peers. Writer encouraged and supported patients in practicing gratitude.    Attendees: 4       Patient's Goal: na    Notes:  Pt was out for the entirety of group meeting with staff for support and then with the psychiatrist.    Status After Intervention:  na    Participation Level: na    Participation Quality: na      Speech:  na      Thought Process/Content: na      Affective Functioning: na      Mood: na      Level of consciousness:  na      Response to Learning: na      Endings: na    Modes of Intervention: na      Discipline Responsible: /Counselor      Signature:  MIESHA RAMÍREZ    
                                                                      Group Therapy Note    Date: 3/20/2024    Group Start Time:  9:40 AM  Group End Time: 10:30 AM  Group Topic: Process Group - Outpatient    [facility]    Carina Garcia MSW        Group Therapy Note    Attendees: 10    Writer facilitated process group this morning. Writer opened by asking the group how they were doing. Writer then asked if anyone had anything they wanted to bring to the group and process. Several people did, and writer facilitated resulting discussions.        Patient's Goal:  Participate in group therapy, engage in open sharing and processing with peers.     Notes:  Patient was pulled for individual counseling session during this group.     Status After Intervention:  Unchanged    Participation Level: None    Participation Quality: Appropriate      Speech:  normal      Thought Process/Content: Logical      Affective Functioning: Congruent      Mood: euthymic      Level of consciousness:  Alert and Oriented x4      Response to Learning: Able to retain information and Capable of insight      Endings: None Reported    Modes of Intervention: Support, Socialization, and Exploration      Discipline Responsible: /Counselor      Signature:  MIESHA Perez    
                                                                      Group Therapy Note    Date: 3/20/2024    Group Start Time: 10:40 AM  Group End Time: 11:30 AM  Group Topic: Cognitive Skills    [facility]    Sal Capellan LCSW        Group Therapy Note    This writer facilitated the cognitive skills group. This writer encouraged a peer to facilitate a mindfulness meditation to start group. The patients were encouraged to participate in an affirmation exercise following the meditation. This writer presented the topic of inner critic vs inner  and prompted discussion from peers following presentation of the material.     Attendees: 9       Patient's Goal:  Practice mindfulness, be open to educational material on inner critic vs inner        Notes: The patient participated in the love and kindness meditation and affirmation exercise. The patient was receptive to educational materials on inner critic vs inner . The patient participated in discussion following presentation of materials.  The patient shared thoughts on inner critic and how it is much easier to listen to her inner critic.  The patient will continue to be open to educational materials in the cognitive skills group.     Status After Intervention:  Unchanged    Participation Level: Active Listener and Interactive    Participation Quality: Appropriate, Attentive, Sharing, and Supportive      Speech:  normal      Thought Process/Content: Logical      Affective Functioning: Congruent      Mood: euthymic      Level of consciousness:  Alert, Oriented x4, and Attentive      Response to Learning: Able to verbalize current knowledge/experience, Capable of insight, and Progressing to goal      Endings: None Reported    Modes of Intervention: Education, Support, and Activity      Discipline Responsible: /Counselor      Signature:  Sal Capellan LCSW    
                                                                      Group Therapy Note    Date: 3/20/2024    Group Start Time: 12:00 PM  Group End Time:  1:00 PM  Group Topic: Psychoeducation    [facility]    Nayana Barrett MSW        Group Therapy Note    Writer facilitated trauma group. Writer provided education on what trauma is, how trauma works in the brain, and how triggers work. Writer provided education on the fight/flight/freeze/alida response. Writer encouraged pts to ask questions and identify how the information relates to their experiences. Writer closed with a box breathing exercise.    Attendees: 5       Patient's Goal:  understanding trauma.      Notes:  Pt presented as attentive and engaged as evidenced by spontaneous participation. Pt shared about her experiences with having a trauma response without knowing what is triggering it. Pt asked questions and reported benefiting from learning about how trauma works. Pt engaged in box breathing. Pt will continue to work on understanding trauma.      Status After Intervention:  Unchanged    Participation Level: Active Listener and Interactive    Participation Quality: Appropriate, Attentive, and Sharing      Speech:  normal      Thought Process/Content: Logical      Affective Functioning: Congruent      Mood: euthymic      Level of consciousness:  Alert, Oriented x4, and Attentive      Response to Learning: Able to verbalize current knowledge/experience and Progressing to goal      Endings: None Reported    Modes of Intervention: Education and Activity      Discipline Responsible: /Counselor      Signature:  MIESHA RAMÍREZ    
Reported    Modes of Intervention: Support and Socialization      Discipline Responsible: /Counselor      Signature:  MIESHA RAMÍREZ

## 2024-03-21 ENCOUNTER — HOSPITAL ENCOUNTER (OUTPATIENT)
Facility: HOSPITAL | Age: 24
Setting detail: RECURRING SERIES
Discharge: HOME | End: 2024-03-24
Payer: MEDICAID

## 2024-03-21 VITALS
SYSTOLIC BLOOD PRESSURE: 127 MMHG | HEART RATE: 93 BPM | TEMPERATURE: 98.4 F | DIASTOLIC BLOOD PRESSURE: 89 MMHG | OXYGEN SATURATION: 99 %

## 2024-03-21 PROCEDURE — 90853 GROUP PSYCHOTHERAPY: CPT

## 2024-03-21 RX ORDER — HYDROXYZINE HYDROCHLORIDE 10 MG/1
10 TABLET, FILM COATED ORAL
Qty: 30 TABLET | Refills: 0 | Status: SHIPPED | OUTPATIENT
Start: 2024-03-21

## 2024-03-21 RX ORDER — BUSPIRONE HYDROCHLORIDE 5 MG/1
2.5 TABLET ORAL 2 TIMES DAILY
Qty: 30 TABLET | Refills: 0 | Status: SHIPPED | OUTPATIENT
Start: 2024-03-21 | End: 2024-04-20

## 2024-03-21 NOTE — GROUP NOTE
Group Therapy Note    Date: 3/21/2024    Group Start Time:  1:10 PM  Group End Time:  2:00 PM  Group Topic: Psychoeducation    [facility]    Nayana Barrett MSW        Group Therapy Note    Writer facilitated psychoeducation group. Writer prompted pts to provide examples anonymously of cognitive distortions, which writer then read aloud at random. Writer prompted and supported pts in healthy challenging of the distortions. Writer encouraged peer feedback.    Attendees: 10       Patient's Goal: building skills for challenging cognitive distortions.    Notes:  Pt presented as attentive and engaged as evidenced by spontaneous participation. Pt asked appropriate questions and provided appropriate feedback. Pt identified her own cognitive distortion and presented as receptive to feedback. Pt will continue to work on building skills for challenging cognitive distortions.    Status After Intervention:  Unchanged    Participation Level: Active Listener and Interactive    Participation Quality: Appropriate, Attentive, and Sharing      Speech:  normal      Thought Process/Content: Logical      Affective Functioning: Congruent      Mood: euthymic      Level of consciousness:  Alert, Oriented x4, and Attentive      Response to Learning: Able to verbalize current knowledge/experience, Capable of insight, and Progressing to goal      Endings: None Reported    Modes of Intervention: Support and Activity      Discipline Responsible: /Counselor      Signature:  MIESHA RAMÍREZ    
                                                                      Group Therapy Note    Date: 3/21/2024    Group Start Time:  9:00 AM  Group End Time:  9:40 AM  Group Topic: Community Meeting    [facility]    Nayana Barrett MSW        Group Therapy Note    Writer facilitated the morning check in community group focused on evaluating presentation, assessing for safety, and processing recent events and building group rapport. Writer facilitated a symptom and safety check in using the morning check in form.  Writer encouraged patients to share events in their lives, identify triggers for difficult emotions, and identify ways in which the patients perceive themselves to be making progress. Writer prompted and supported peer feedback. Writer facilitated rapport discussion around shared experiences and redirected context inappropriate comments.    Attendees: 6       Patient's Goal:  disclosing safety concerns and engaging in peer discussion and support.     Notes:  Pt denied SI/HI/TIRSO on the check in form. Pt engaged appropriately and playfully with peers. Pt discussed safety skills and shared about a situation where she thought she observed a fake . Pt discussed shared experiences with peers. Pt will continue to work on disclosing safety concerns and engaging in peer discussion and support.     Status After Intervention:  Unchanged    Participation Level: Active Listener and Interactive    Participation Quality: Appropriate, Attentive, and Sharing      Speech:  normal      Thought Process/Content: Logical      Affective Functioning: Congruent      Mood: euthymic      Level of consciousness:  Alert, Oriented x4, and Attentive      Response to Learning: Able to verbalize current knowledge/experience and Progressing to goal      Endings: None Reported    Modes of Intervention: Support and Socialization      Discipline Responsible: /Counselor      Signature:  MIESHA RAMÍREZ    
                                                                      Group Therapy Note    Date: 3/21/2024    Group Start Time:  9:40 AM  Group End Time: 10:30 AM  Group Topic: Process Group - Outpatient    [facility]    Nayana Barrett MSW        Group Therapy Note    Writer facilitated process group. Writer encouraged pts to share issues on which they want support with problem solving, issues they need to process with peers, or events they want to celebrate. Writer prompted and supported peer feedback. Writer provided validation and feedback as well as utilized open ended questions to support further exploration of topics.      Attendees: 6       Patient's Goal:   engaging with peers in discussion and feedback.      Notes: Pt presented as attentive and engaged as evidenced by spontaneous participation. Pt provided supportive feedback to peers. Pt will continue to work on engaging with peers in discussion and feedback.      Status After Intervention:  Unchanged    Participation Level: Active Listener and Interactive    Participation Quality: Appropriate, Attentive, and Sharing      Speech:  normal      Thought Process/Content: Logical      Affective Functioning: Congruent      Mood: euthymic      Level of consciousness:  Alert, Oriented x4, and Attentive      Response to Learning: Able to verbalize current knowledge/experience and Progressing to goal      Endings: None Reported    Modes of Intervention: Support and Socialization      Discipline Responsible: /Counselor      Signature:  MIESHA RAMÍREZ    
                                                                      Group Therapy Note    Date: 3/21/2024    Group Start Time: 10:40 AM  Group End Time: 11:30 AM  Group Topic: Cognitive Skills    [facility]    Sal Capellan LCSW        Group Therapy Note    This writer facilitated the cognitive skills group. The patients were provided educational materials on cognitive distortions. The patient were encouraged to discuss how cognitive distortions affect them.  The patinet discussed relationships following presentation of educational materials.      Attendees: 5       Patient's Goal:  Be open to educational materials on cognitive distortions       Notes: The patient was open to and engaged with educational materials on cognitive distortions. The patient discussed how she has experienced cognitive distortions. The patient participated in a discussion on relationships. The patient shared details of her relationship with peers. The patient will continue to discuss cognitive distortions in the cognitive skills group.     Status After Intervention:  Unchanged    Participation Level: Active Listener and Interactive    Participation Quality: Appropriate, Attentive, and Sharing      Speech:  normal      Thought Process/Content: Logical      Affective Functioning: Congruent      Mood: euthymic      Level of consciousness:  Alert, Oriented x4, and Attentive      Response to Learning: Able to verbalize current knowledge/experience      Endings: None Reported    Modes of Intervention: Education, Support, and Activity      Discipline Responsible: /Counselor      Signature:  Sal Capellan LCSW    
                                                                      Group Therapy Note    Date: 3/21/2024    Group Start Time: 12:00 PM  Group End Time:  1:00 PM  Group Topic: Psychoeducation    [facility]    Sal Capellan LCSW        Group Therapy Note    The patients were encouraged to participate in a ice breaker exercise to welcome a new peer.The patients were provided educational materials on boundaries and were encouraged to complete a healthy boundaries assessment. The patients were encouraged to discuss boundaries.     Attendees: 09       Patient's Goal: open to educational materials on boundaries       Notes: The patient participated in the ice breaker exercise. The patient was open to educational materials on boundaries and participated in discussion on boundaries.  The patient will continue to be open to educational materials.     Status After Intervention:  Unchanged    Participation Level: Active Listener, Interactive, and Monopolizing    Participation Quality: Appropriate, Attentive, Sharing, and Supportive      Speech:  normal      Thought Process/Content: Logical      Affective Functioning: Congruent      Mood: euthymic      Level of consciousness:  Alert, Oriented x4, and Attentive      Response to Learning: Able to verbalize current knowledge/experience, Capable of insight, and Progressing to goal      Endings: None Reported    Modes of Intervention: Education and Activity      Discipline Responsible: /Counselor      Signature:  Sal Capellan LCSW    
Socialization, and Exploration      Discipline Responsible: /Counselor      Signature:  MIESHA Perez

## 2024-03-21 NOTE — BH NOTE
Pt came to writer at 12:45 asking for writer to read O2 levels due to being unsure if she was panicking or having asthma attack. According to O2/pulse reader, pt O2 was 99, pulse 93. Pt thanked writer and left office.

## 2024-03-22 ENCOUNTER — HOSPITAL ENCOUNTER (OUTPATIENT)
Facility: HOSPITAL | Age: 24
Setting detail: RECURRING SERIES
Discharge: HOME | End: 2024-03-25
Payer: MEDICAID

## 2024-03-22 VITALS
OXYGEN SATURATION: 100 % | HEART RATE: 91 BPM | TEMPERATURE: 97.9 F | DIASTOLIC BLOOD PRESSURE: 89 MMHG | SYSTOLIC BLOOD PRESSURE: 121 MMHG

## 2024-03-22 PROCEDURE — 90853 GROUP PSYCHOTHERAPY: CPT

## 2024-03-22 NOTE — GROUP NOTE
Group Therapy Note    Date: 3/22/2024    Group Start Time: 12:00 PM  Group End Time:  1:00 PM  Group Topic: Psychotherapy    [facility] Anna Hernandez        Group Therapy Note  This writer facilitated a treatment planning group by providing education around acute, complicated, and integrated grief. This writer then discussed tips for continuing bonds with people who are . Patients engaged in discussion regarding tips they have used for processing grief and emotions they have experienced. This writer used open ended questions to promote further discussion and reflection.    Attendees: 9       Patient's Goal:  To learn about the different forms of grief and to discuss tips for processing grief.     Notes:  The patient initially presented to the session well. This writer then shared that the group would discuss grief and tips on how to commemorate the life of people who have passed. The patient shared that they were not mentally in the space to discuss grief currently. The patient chose to step out and did not return to the group for majority of the session. The patient will continue to work on processing grief in further groups.     Status After Intervention:      Participation Level:     Participation Quality:       Speech:     Thought Process/Content:       Affective Functioning:       Mood:       Level of consciousness:        Response to Learning:       Endings:     Modes of Intervention:       Discipline Responsible: /Counselor      Signature:  MIESHA Tan Student.

## 2024-03-22 NOTE — GROUP NOTE
Group Therapy Note    Date: 3/22/2024    Group Start Time:  9:00 AM  Group End Time:  9:40 AM  Group Topic: Community Meeting    [facility]    Carina Garcia MSW        Group Therapy Note    Attendees: 10    Writer facilitated check in group this morning. Writer opened by encouraging patients to complete their check in sheets and asking them to share how they were doing. Writer facilitated processing and sharing until the end of group.        Patient's Goal:  Participate in group therapy, complete check in sheet, engage in sharing and checking in with peers to build rapport for the day.     Notes:  Patient engaged well in group this morning. She filled out her check in sheet, declining any SI or thoughts of self harm. She identified low levels of depression and anger and a middling level of anxiety. Patient provided excellent verbal support for a processing peer and will continue with identified treatment goals in further groups.     Status After Intervention:  Improved    Participation Level: Active Listener and Interactive    Participation Quality: Appropriate, Attentive, and Sharing      Speech:  normal      Thought Process/Content: Logical      Affective Functioning: Congruent      Mood: euthymic      Level of consciousness:  Alert and Oriented x4      Response to Learning: Able to verbalize current knowledge/experience, Capable of insight, and Progressing to goal      Endings: None Reported    Modes of Intervention: Support, Socialization, and Exploration      Discipline Responsible: /Counselor      Signature:  MIESHA Perez

## 2024-03-22 NOTE — GROUP NOTE
Group Therapy Note    Date: 3/22/2024    Group Start Time:  1:10 PM  Group End Time:  2:00 PM  Group Topic: Psychoeducation    [facility]    Sal Capellan LCSW        Group Therapy Note    This writer facilitated the Psychoeducational group. The patient's were encouraged to participate in a discharging patient's requested art activity. The patient's requested activity was an affirmation art activity.     Attendees: 9       Patient's Goal: Participate in affirmation activity      Notes: The patient participated in ice breaker activity with new patient. The patient participated in the discharge activity to share affirmations with peers. The patient will continue to participate in affirmation activity during the psychoeducational groups.      Status After Intervention:  Unchanged    Participation Level: Active Listener and Interactive    Participation Quality: Appropriate, Attentive, Sharing, and Supportive      Speech:  normal      Thought Process/Content: Logical      Affective Functioning: Congruent      Mood: euthymic      Level of consciousness:  Alert, Oriented x4, and Attentive      Response to Learning: Able to verbalize current knowledge/experience, Capable of insight, and Progressing to goal      Endings: None Reported    Modes of Intervention: Support, Socialization, and Activity      Discipline Responsible: /Counselor      Signature:  Sal Capellan LCSW

## 2024-03-22 NOTE — GROUP NOTE
Group Therapy Note    Date: 3/22/2024    Group Start Time:  9:40 AM  Group End Time: 10:30 AM  Group Topic: Process Group - Outpatient    [facility]    Anna Laguna        Group Therapy Note  This writer facilitated a process group by encouraging the patients to share about any recent thoughts, emotions, feelings, or situations, that they have experienced, in order to gain support and feedback from their peers. During this conversation the topic of support, time management, self-harm, and safety were discussed. This writer used open ended questions and reflective statements to encourage patients to reflect on their feelings.    Attendees: 10       Patient's Goal:  To process thoughts and feelings and provide support and feedback to peers.    Notes:  The patient presented well to the session. The patient sat upright in her chair as she listened to her peers process their thoughts. The patient then provided verbal support and feedback to her peers regarding trust, job security, and communication. The patient shared healthy ways to communicate with others and tips that have worked for her to manage scheduling conflicts with jobs and mental health. The patient will continue to work on processing emotions and thoughts in further groups.     Status After Intervention:  Unchanged    Participation Level: Active Listener and Interactive    Participation Quality: Appropriate, Attentive, Sharing, and Supportive      Speech:  normal      Thought Process/Content: Logical  Linear      Affective Functioning: Congruent      Mood: euthymic      Level of consciousness:  Alert, Oriented x4, and Attentive      Response to Learning: Able to verbalize current knowledge/experience, Able to retain information, and Capable of insight      Endings: None Reported    Modes of Intervention: Support, Socialization, Exploration, and Clarifying      Discipline

## 2024-03-22 NOTE — GROUP NOTE
Group Therapy Note    Date: 3/22/2024    Group Start Time:  2:00 PM  Group End Time:  2:45 PM  Group Topic: Wrap-Up    [facility]    Carina Garcia MSW        Group Therapy Note    Attendees: 9    Writer facilitated wrap up group this afternoon. Writer opened by encouraging patients to fill out their wrap up sheets. Writer also provided patients with their safety plans, reminded them to review the plan and make any changes as needed. Writer then allowed patients a few minutes to finish affirmation exercise from the group before. Once complete, writer congratulated two graduating peers and had them reflect on their time. To finish, writer facilitated a peer led yoga group which was requested by one of the graduating group members.       Patient's Goal:  Participate in group therapy, fill out wrap up sheet, engage in closing yoga activity.     Notes:  Patient engaged well in group this afternoon. She filled out her wrap up sheet, declining any SI or HI. She identified low levels of depression and anger and middling levels of anxiety. Patient engaged well in the yoga activity, and provided excellent verbal support for her graduating peers. Patient will continue with identified treatment goals in further groups.     Status After Intervention:  Improved    Participation Level: Active Listener and Interactive    Participation Quality: Appropriate, Attentive, and Sharing      Speech:  normal      Thought Process/Content: Logical      Affective Functioning: Congruent      Mood: euthymic      Level of consciousness:  Alert and Oriented x4      Response to Learning: Able to verbalize current knowledge/experience, Capable of insight, and Progressing to goal      Endings: None Reported    Modes of Intervention: Support, Socialization, Activity, and Movement      Discipline Responsible: /Counselor      Signature:  MIESHA Perez

## 2024-03-25 ENCOUNTER — HOSPITAL ENCOUNTER (OUTPATIENT)
Facility: HOSPITAL | Age: 24
Setting detail: RECURRING SERIES
Discharge: HOME | End: 2024-03-28
Payer: MEDICAID

## 2024-03-25 VITALS
SYSTOLIC BLOOD PRESSURE: 130 MMHG | HEART RATE: 110 BPM | OXYGEN SATURATION: 100 % | TEMPERATURE: 97.7 F | DIASTOLIC BLOOD PRESSURE: 85 MMHG

## 2024-03-25 PROCEDURE — 90853 GROUP PSYCHOTHERAPY: CPT

## 2024-03-25 NOTE — GROUP NOTE
Group Therapy Note    Date: 3/25/2024    Group Start Time:  9:40 AM  Group End Time: 10:30 AM  Group Topic: Process Group - Outpatient    [facility]    Anna Laguna        Group Therapy Note  This writer facilitated a process group by encouraging the patients to share about any recent thoughts, emotions, feelings, or situations, that they have experienced, in order to gain support and feedback from their peers. During this conversation the topic of psychopharmacology, substance use, and family roles were discussed. This writer used open ended questions and reflective statements to encourage patients to reflect on their feelings and thoughts, and prompt discussion.     Attendees: 7       Patient's Goal:  To process thoughts and feelings and provide support and feedback to peers.    Notes:  The patient presented well to the session. The patient appeared to listen attentively as her peers processed their thoughts and feelings. The patient then provided support to her peer as they discussed sobriety. The patient will continue to work on processing thoughts and feelings in further groups.     Status After Intervention:  Unchanged    Participation Level: Active Listener and Minimal    Participation Quality: Appropriate, Attentive, and Supportive      Speech:  normal      Thought Process/Content: Logical  Linear      Affective Functioning: Congruent      Mood: euthymic      Level of consciousness:  Alert, Oriented x4, and Attentive      Response to Learning: Able to verbalize current knowledge/experience, Able to retain information, and Capable of insight      Endings: None Reported    Modes of Intervention: Support and Socialization      Discipline Responsible: /Counselor      Signature:  MIESHA Tan Student.

## 2024-03-26 ENCOUNTER — HOSPITAL ENCOUNTER (OUTPATIENT)
Facility: HOSPITAL | Age: 24
Setting detail: RECURRING SERIES
Discharge: HOME | End: 2024-03-29
Payer: MEDICAID

## 2024-03-26 VITALS
TEMPERATURE: 97.9 F | SYSTOLIC BLOOD PRESSURE: 126 MMHG | OXYGEN SATURATION: 100 % | DIASTOLIC BLOOD PRESSURE: 92 MMHG | HEART RATE: 83 BPM

## 2024-03-26 PROCEDURE — 90853 GROUP PSYCHOTHERAPY: CPT

## 2024-03-26 NOTE — GROUP NOTE
Group Therapy Note    Date: 3/26/2024    Group Start Time:  2:00 PM  Group End Time:  2:45 PM  Group Topic: Wrap-Up    [facility]    Sal Capellan LCSW        Group Therapy Note:    This writer facilitted the wrap up group. The patient's were encouraged to complete an afternoon wrap up assessment. The patients were encouraged to review educational materials on sleep hygiene. The patients were encouraged to complete an afternoon agenda prioritizing self-care and utilizing a sleep tip from the provided education.     Attendees: 8       Patient's Goal:  Pt. Did not attend group      Notes: Pt. Did not attend group.     Status After Intervention:  N/A    Participation Level: N/A    Participation Quality: N/A      Speech:  N/A      Thought Process/Content: N/A      Affective Functioning: N/A      Mood: N/A      Level of consciousness:  N/A      Response to Learning: N/A      Endings: N/A    Modes of Intervention: N/A      Discipline Responsible: /Counselor      Signature:  Sal Capellan LCSW    
                                                                      Group Therapy Note    Date: 3/26/2024    Group Start Time:  9:40 AM  Group End Time: 10:30 AM  Group Topic: Process Group - Outpatient    [facility]    Carina Garcia MSW        Group Therapy Note    Attendees: 8    Writer facilitated process group this morning. Writer opened by checking in with patients, and asking if any of them had anything they'd like to process with the group. Writer facilitated resulting processing until the end of group.        Patient's Goal:  Participate in group therapy, engage in open sharing and processing with peers.     Notes:  Patient engaged well in group this morning. She provided verbal support for her peers, sharing about an experience she had with ending a friendship on less than positive terms. She presented as encouraging of her peer and showed good empathy. Patient interacted appropriately with her peers throughout group and will continue with identified treatment goals in further groups.     Status After Intervention:  Improved    Participation Level: Active Listener and Interactive    Participation Quality: Appropriate, Attentive, Sharing, and Supportive      Speech:  normal      Thought Process/Content: Logical      Affective Functioning: Congruent      Mood: euthymic      Level of consciousness:  Alert and Oriented x4      Response to Learning: Able to verbalize current knowledge/experience, Capable of insight, and Progressing to goal      Endings: None Reported    Modes of Intervention: Support, Socialization, and Exploration      Discipline Responsible: /Counselor      Signature:  MIESHA Perez    
                                                                      Group Therapy Note    Date: 3/26/2024    Group Start Time: 10:40 AM  Group End Time: 11:30 AM  Group Topic: Cognitive Skills    [facility] Anna Hernandez        Group Therapy Note  This writer facilitated a cognitive skills group on self assessment. Patients were asked to rate themselves on a scale of 1 to 5 regarding their wellness.  Patients were then encouraged to reflect on which areas of their life could use improvement. This writer provided patients with positive tips to wellbeing to increase their wellness. This writer used open ended questions to promote discussion and reflection.    Attendees: 8       Patient's Goal:  To assess and improve wellness.    Notes:  The patient presented well to the session. The patient completed the wellness self-assessment, and was encouraged to reflect on areas that may need improvement. The patient shared that her relationships were doing well for the most part. She also discussed sleeping tips that she's uses in order to sleep well, such as essential oils. The patient will continue to work on identified treatment goals in further groups.     Status After Intervention:  Unchanged    Participation Level: Active Listener and Interactive    Participation Quality: Appropriate, Attentive, Sharing, and Supportive      Speech:  normal      Thought Process/Content: Logical  Linear      Affective Functioning: Congruent      Mood: euthymic      Level of consciousness:  Alert, Oriented x4, and Attentive      Response to Learning: Able to verbalize current knowledge/experience, Able to verbalize/acknowledge new learning, Able to retain information, and Capable of insight      Endings: None Reported    Modes of Intervention: Education, Support, Socialization, Exploration, and Clarifying      Discipline Responsible: /Counselor      Signature:  MIESHA Tan Student.     
                                                                      Group Therapy Note    Date: 3/26/2024    Group Start Time: 12:00 PM  Group End Time:  1:00 PM  Group Topic: Psychoeducation    [facility]    Nayana Barrett MSW        Group Therapy Note    Writer facilitated psychoeducation group on fair fighting rules. Writer opened with peer introductions and an ice breaker for the new pt. Writer transitioned to prompting pts to share their knowledge of fair fighting rules. Writer prompted pts to read from the handout aloud then provided further psychoeducation and prompted questions. Writer encouraged peer feedback.    Attendees: 9       Patient's Goal:   learning fair fighting rules.    Notes:  Pt presented as attentive and engaged as evidenced by spontaneous participation. Pt engaged appropriately in the peer introductions. Pt endorsed relating to education on healthy communication skills not guaranteeing a healthy response from the other person. Pt will continue to work on learning fair fighting rules.    Status After Intervention:  Unchanged    Participation Level: Active Listener and Interactive    Participation Quality: Appropriate, Attentive, and Sharing      Speech:  normal      Thought Process/Content: Logical      Affective Functioning: Congruent      Mood: euthymic      Level of consciousness:  Alert, Oriented x4, and Attentive      Response to Learning: Able to verbalize current knowledge/experience and Progressing to goal      Endings: None Reported    Modes of Intervention: Education      Discipline Responsible: /Counselor      Signature:  MIESHA RAMÍREZ    
Clarifying      Discipline Responsible: /Counselor      Signature:  Anna Laguna, MSW Student.

## 2024-03-26 NOTE — PROGRESS NOTES
MEDICATION GROUP THERAPY PROGRESS NOTE      Kaylee Berrios was not present for medication group.    TOPIC: Medication safety    GROUP TIME: 1:10 - 2:00 PM Tuesday    PERSONAL GOAL FOR PARTICIPATION: To be present for group, participate in discussion, and answer patient-directed questions.    GOAL ORIENTATION: Personal    THERAPEUTIC INTERVENTIONS REVIEWED AND DISCUSSED: The following topics were presented: questions to ask your doctor when a new medication is prescribed, who to talk to if you have questions about medications, drug-drug interactions, effect of substances of abuse and alcohol on mental health, the importance of carrying an updated medication list and how to create your own medication list.    IMPRESSION OF PARTICIPATION: did not attend for unknown reason      Karla Oliver [facility]  Clinical Pharmacy Specialist, Behavioral Health  Desk: [phone] ([phone]  Pharmacy: [phone] ([phone])

## 2024-03-27 ENCOUNTER — HOSPITAL ENCOUNTER (OUTPATIENT)
Facility: HOSPITAL | Age: 24
Setting detail: RECURRING SERIES
Discharge: HOME | End: 2024-03-30
Payer: MEDICAID

## 2024-03-27 VITALS
DIASTOLIC BLOOD PRESSURE: 92 MMHG | HEART RATE: 93 BPM | TEMPERATURE: 98.2 F | SYSTOLIC BLOOD PRESSURE: 122 MMHG | OXYGEN SATURATION: 99 %

## 2024-03-27 PROCEDURE — 90832 PSYTX W PT 30 MINUTES: CPT

## 2024-03-27 PROCEDURE — 90853 GROUP PSYCHOTHERAPY: CPT

## 2024-03-27 NOTE — GROUP NOTE
Group Therapy Note    Date: 3/27/2024    Group Start Time:  2:00 PM  Group End Time:  2:45 PM  Group Topic: Wrap-Up    [facility]    Nayana Barrett MSW        Group Therapy Note    Writer facilitated a community wrap up group focused on assessing for safety, coping skills practice, and planning ways to engage in healthy coping while in the community. Writer facilitated a symptom and safety check in using the afternoon check in form. Writer facilitated the shake it out exercise. Writer facilitated a mindfulness and grounding game to help pts practice coping.    Attendees: 6       Patient's Goal:  disclosing safety concerns and practicing coping skills.      Notes:  Pt denied SI/HI/TIRSO on the check in form. Pt presented as attentive and engaged as evidenced by appropriate eye contact. Pt participated actively in both activities. Pt adapted the shake it out activity to do it sitting due to a headache. Pt will continue to work on disclosing safety concerns and practicing coping skills.      Status After Intervention:  Unchanged    Participation Level: Active Listener and Interactive    Participation Quality: Appropriate, Attentive, and Sharing      Speech:  normal      Thought Process/Content: Logical      Affective Functioning: Congruent      Mood: euthymic      Level of consciousness:  Alert, Oriented x4, and Attentive      Response to Learning: Able to verbalize current knowledge/experience and Progressing to goal      Endings: None Reported    Modes of Intervention: Socialization and Activity      Discipline Responsible: /Counselor      Signature:  MIESHA RAMÍREZ

## 2024-03-27 NOTE — GROUP NOTE
Group Therapy Note    Date: 3/27/2024    Group Start Time: 12:00 PM  Group End Time:  1:00 PM  Group Topic: Psychotherapy    [facility]    Carina Garcia, MIESHA        Group Therapy Note    Attendees: 9    Writer facilitated psychotherapy group this afternoon. Writer had an activity planned, however upon entering group, several patients endorsed anxiety. Therefore, writer provided some space for processing as well as psychoeducation about anxiety and facilitated resulting discussions. Writer ended the group on a light note with a question about how to make one's greatest fear funny (I.e. spiders in rollerskates).        Patient's Goal:  Participate in group therapy, increase understanding of anxiety and triggers, engage in processing as needed.     Notes:  Patient engaged well in group this afternoon. She showed good verbal support and empathy for a peer sharing about a panic attack, especially by sharing about her own experiences with panic attacks. Patient shared that sometimes breathing deeply doesn't help her, as she often hyperventilates as a symptom. She shared that her anxiety often manifests as medical anxiety, so if anything is off in her body it can make her feel anxious. Patient interacted well with peers throughout and will continue with identified treatment goals in further groups.     Status After Intervention:  Improved    Participation Level: Active Listener and Interactive    Participation Quality: Appropriate, Attentive, and Sharing      Speech:  normal      Thought Process/Content: Logical      Affective Functioning: Congruent      Mood: euthymic      Level of consciousness:  Alert and Oriented x4      Response to Learning: Able to verbalize current knowledge/experience, Capable of insight, and Progressing to goal      Endings: None Reported    Modes of Intervention: Education, Support, and Exploration      Discipline Responsible:

## 2024-03-27 NOTE — BH NOTE
1320 Writer noticed pt sitting alone out of group and asked if pt is well. Pt presented as anxious and reported feeling overwhelmed and needing a break. Writer prompted pt to identify coping skills and suggested going for a walk. Pt shook her head, sat a moment, then apologized and walked hurriedly out of the room. Writer followed and asked pt if she felt safe taking space outside the PHP suite. Pt stated yes. Writer stated a staff member would check on the pt in a few minutes. Pt returned to the PHP suite a few minutes later and again took space in the empty group room. It is noted these behaviors are typical for the pt when experiencing anxiety. Writer notified Shwetha Tamayo LCSW to keep watch for if the pt needed further support.    MIESHA Daley  
3/26/2024    Patient approached writer asking to talk for a few minutes. Patient had been presenting dysregulated, so writer engaged in a short processing session with patient. Patient expressed feeling anxious, especially around going home. She reports she only goes home to sleep. She expressed a fear of getting kicked out of the program here, and writer provided reassurance and validation of patient's progress so far. Patient presented brighter prior to leaving, and filled out her wrap up sheet before leaving for the day (declined SI/HI).     MIESHA Perez    
This writer contacted mrs. Matt Wong from [facility] to initiate discharge coordination. This writer left Mrs. Wong a message to return this writer's call.     Sal Capellan LCSW.   
modification as appropriate  Disposition planning with social work          [x] NO NEW Medications at this time.   [] New Medication prescribed and prescription provided to patient  [x] PHP Nurse notified of changes as needed    [x] Regarding any medications: patient instructed on purpose, benefits, side effects,   risks, and alternative treatments. Patient verbalizes understanding of instructions and has had the opportunity to ask questions.    Dae Nieto MD  03/27/24   1:42 AM

## 2024-03-27 NOTE — BH NOTE
Partial Hospitalization Program Individual Psychotherapy Note      Diagnosis:     Goal:         Psychotherapy Session    Start time: 0950  Stop time: 1020        Patient Mental Status and Mood/Affect:Calm    Patient Behavior and Appearance: Cooperativeshows no evidence of impairment    Intervention/Techniques: Informed, Validated/Supported, and Other: Treatment plan review and update, discharge planning.     Focus of Session/Patient Response and Progress Towards Goal: The patient participated in treatment planning and discharge planning.     Narrative: The patient denied SI/HI or AH/VH. The patient was oriented to person, place, time, and situation. The patient was appropriate during meeting and participated in treatment and discharge planning.      The patient and thsi writer discussed medications. The patient reported that she has yet to start taking the buspar prescribed by Dr. Tariq due to being very cautious about what medications she takes.  The patient reported compliance with Ativan for anxiety. The patient acknowledged that the afternoons are difficult for her to stay present and feels her anxiety becomes unmanageable. This writer encouraged the patient to continue to take the additional time provided to her and to speak with peers and staff when needed. The patient agreed. The patient discussed feeling comfortable and safe at her fathers about 1/2 the time and it being unpredictable, but that she feels she can stay at her fathers and be safe. The patient and this writer discussed her discharge date. The patient and this writer extended the patient's stay to 4/19 adding an additional week for continued mood management and medication management.     The patient discussed an upcomming pirate event that she is excited and preparing for.      The patient and this writer reviewed and updated the patient treatment plan.  The patient signed her treatment plan.     The patient is appropriate for continued

## 2024-03-27 NOTE — GROUP NOTE
Group Therapy Note    Date: 3/27/2024    Group Start Time:  9:00 AM  Group End Time:  9:40 AM  Group Topic: Community Meeting    [facility]    Carina Garcia MSW        Group Therapy Note    Attendees: 7    Writer facilitated community check in group this morning. Writer opened by asking patients to complete their check in sheets, and then asked them how they were doing this morning. Writer facilitated resulting discussions until the end of group.        Patient's Goal:  Participate in group therapy, complete check in sheet, engage in sharing with peers.      Notes:  Patient engaged well in group this morning. She filled out her check in sheet, declining any SI or thoughts of self harm. Patient identified middling levels of depression and anxiety and low level of anger. Patient provided good insight for a peer who was processing about Judaism and spirituality by sharing her own experience with the same. Patient expressed that it took her time to grow into these things in a way that felt right for her. She interacted well with her peers throughout and will continue with identified treatment goals in further groups.     Status After Intervention:  Improved    Participation Level: Active Listener and Interactive    Participation Quality: Appropriate, Attentive, Sharing, and Supportive      Speech:  normal      Thought Process/Content: Logical      Affective Functioning: Congruent      Mood: euthymic      Level of consciousness:  Alert and Oriented x4      Response to Learning: Able to verbalize current knowledge/experience, Capable of insight, and Progressing to goal      Endings: None Reported    Modes of Intervention: Support, Socialization, and Exploration      Discipline Responsible: /Counselor      Signature:  MIESHA Perez

## 2024-03-27 NOTE — GROUP NOTE
Group Therapy Note    Date: 3/27/2024    Group Start Time:  9:42 AM  Group End Time: 10:30 AM  Group Topic: Process Group - Outpatient    [facility]    Nayana Barrett MSW        Group Therapy Note    Writer facilitated process group. OMERO Scherer intern, sat in for observation for educational purposes. Writer encouraged pts to share issues on which they want support with problem solving, issues they need to process with peers, or events they want to celebrate. The writer prompted and supported peer feedback. Writer provided validation and feedback as well as utilized open ended questions to support further exploration of topics.     Attendees: 8       Patient's Goal:  na    Notes:  Pt was pulled for individual and was not counted for group.    Status After Intervention:  na    Participation Level: na    Participation Quality: na      Speech:  na      Thought Process/Content: na      Affective Functioning: na      Mood: na      Level of consciousness:  na      Response to Learning: na      Endings: na    Modes of Intervention: na      Discipline Responsible: /Counselor      Signature:  MIESHA RAMÍREZ

## 2024-03-27 NOTE — GROUP NOTE
Group Therapy Note    Date: 3/27/2024    Group Start Time:  1:10 PM  Group End Time:  2:00 PM  Group Topic: Relapse Prevention    [facility]    Sal Capellan LCSW        Group Therapy Note    This writer assisted in facilitating the peer recovery group. The patients were introduced to peer  Savanah and were encouraged to listen to Savanah speak about her personal journey to recovery. The patients were encouraged to ask questions of specialist.     Attendees: 8       Patient's Goal: Listen to and ask questions of the peer .       Notes: The patient was present and attentive to peer  Savanah as she presented. The patient participated in the question portion of the group. The patient will continue to be receptive to peer  during the relapse prevention groups.     Status After Intervention:  Unchanged    Participation Level: Active Listener    Participation Quality: Appropriate and Attentive      Speech:  normal      Thought Process/Content: Logical      Affective Functioning: Congruent      Mood: euthymic      Level of consciousness:  Alert, Oriented x4, and Attentive      Response to Learning: Able to verbalize current knowledge/experience and Capable of insight      Endings: None Reported    Modes of Intervention: Education      Discipline Responsible: /Counselor      Signature:  Sal Capellan LCSW

## 2024-03-27 NOTE — GROUP NOTE
Group Therapy Note    Date: 3/27/2024    Group Start Time: 10:40 AM  Group End Time: 11:30 AM  Group Topic: Cognitive Skills    [facility]    Sal Capellan LCSW        Group Therapy Note    This writer facilitated the cognitive skills group. The patients were encouraged to participate in discussion of values. The patients were encouraged to complete a values exploration work sheet and share answers with peers.     Attendees: 9       Patient's Goal: Discuss and explore values       Notes: The patient participated in values discussion. The patient completed the values exploration worksheet and shared values with peers. The patient shared values of her mother and best friend, and participated in discussion of personal values.  The patient will continue to explore values in the cognitive skills group.     Status After Intervention:  Unchanged    Participation Level: Active Listener and Interactive    Participation Quality: Appropriate, Attentive, and Sharing      Speech:  normal      Thought Process/Content: Logical      Affective Functioning: Congruent      Mood: euthymic      Level of consciousness:  Alert, Oriented x4, and Attentive      Response to Learning: Able to verbalize current knowledge/experience, Capable of insight, and Progressing to goal      Endings: None Reported    Modes of Intervention: Education, Exploration, and Activity      Discipline Responsible: /Counselor      Signature:  Sal Capellan LCSW

## 2024-03-28 ENCOUNTER — HOSPITAL ENCOUNTER (OUTPATIENT)
Facility: HOSPITAL | Age: 24
Setting detail: RECURRING SERIES
Discharge: HOME | End: 2024-03-31
Payer: MEDICAID

## 2024-03-28 VITALS
DIASTOLIC BLOOD PRESSURE: 79 MMHG | HEART RATE: 98 BPM | TEMPERATURE: 98.3 F | OXYGEN SATURATION: 99 % | SYSTOLIC BLOOD PRESSURE: 119 MMHG

## 2024-03-28 PROCEDURE — 90853 GROUP PSYCHOTHERAPY: CPT

## 2024-03-28 NOTE — GROUP NOTE
Group Therapy Note    Date: 3/28/2024    Group Start Time:  2:00 PM  Group End Time:  2:45 PM  Group Topic: Wrap-Up    [facility]    Sal Capellan LCSW        Group Therapy Note    This writer facilitated the wrap up group. The patient's were encouraged to complete the afternoon wrap up assessment to identify mood and reflect on the treatment day. The patient were encouraged to plan for self-care and share with peers.     Attendees: 8       Patient's Goal:  Identify mood and plan for self-care       Notes: The patient completed the afternoon wrap up assessment and denied SI/HI. The patient completed an evening agenda to plan for self-care and shared with peers. The patient shared that she will prepare for an upcomming pirate festival this evening. The patient will continue to identify emotions and plan for self-care.     Status After Intervention:  Unchanged    Participation Level: Active Listener and Interactive    Participation Quality: Appropriate, Attentive, Sharing, and Supportive      Speech:  normal      Thought Process/Content: Logical      Affective Functioning: Congruent      Mood: euthymic      Level of consciousness:  Alert, Oriented x4, and Attentive      Response to Learning: Able to verbalize current knowledge/experience and Progressing to goal      Endings: None Reported    Modes of Intervention: Support, Socialization, and Exploration      Discipline Responsible: /Counselor      Signature:  Sal Capellan LCSW

## 2024-03-28 NOTE — BH NOTE
OUTPATIENT PHYSICIAN PROGRESS NOTE      Chief Complaint/Symptoms/Impairments (as noted in Treatment Plan): \"I'm a bit scared to try new medicine\"    Criteria for Continued Treatment (check all that apply):   [x] Preventing Decompensation   [x] Improving Level of Functioning  [] Reducing Isolative Behaviors  [] Understanding Diagnosis and Need for Medications  [] Improving Treatment/Medication Compliance  [] Confronting Denial of Illness  [x] Stabilizing Level of Functioning  [] Improving Emotional/Social/Cognitive Functioning  [] Decreasing Frequency of Hospitalizations    Suicidal/Homicidal ideations:   [x] Absent  [] Present  [] Passive  [] Intent  [] Plan  [] Death Wishes      CURRENT CONDITION OF PATIENT & PROGRESS ON TREATMENT PLAN    Subjective (ongoing complaints by patient regarding symptom severity, presentation, affect, function, etc.): Reports continued anxiety. Discussed use of lorazepam, reports at most once daily and at most half a tablet at a time. This is congruent with  check.      Behavior (side effects, changes in mental status, objective observations seen in individual sessions or by staff) Reports no SI; anxiety worse at home. Has picked up BuSpar but not started it yet.      Assessment/Plan (functional improvement and/or ongoing impairment, response to treatment, plan for future ongoing treatment and medication management to include revisions): Discussed starting Buspar as a way to step down from Ativan (since lorazepam is controlled and buspar is not). Spent time educating and reassurance provided. She agrees to start it tomorrow AM.        [] NO NEW Medications at this time.   [x] New Medication prescribed and prescription provided to patient  [] PHP Nurse notified of changes as needed    [x] Regarding any medications: patient instructed on purpose, benefits, side effects,   risks, and alternative treatments. Patient verbalizes understanding of instructions and has had the opportunity to

## 2024-03-28 NOTE — GROUP NOTE
Group Therapy Note    Date: 3/28/2024    Group Start Time:  9:40 AM  Group End Time: 10:30 AM  Group Topic: Process Group - Outpatient    [facility]    Nayana Barrett MSW        Group Therapy Note    Writer facilitated process group. Writer encouraged pts to share issues on which they want support with problem solving, issues they need to process with peers, or events they want to celebrate. Writer prompted and supported peer feedback and discussion. Writer provided validation and feedback as well as utilized open ended questions to support further exploration of topics.  Writer utilized open ended questions and reflections to guide rapport building conversations towards therapeutic topics such as strength identification and processing barriers to happiness.    Attendees: 8       Patient's Goal:  engaging in peer discussion, support, and feedback.    Notes:  Pt presented as attentive and engaged as evidenced by appropriate eye contact and interaction with peers. Pt provided appropriate feedback and engaged playfully and appropriately with peers. Pt discussed a shared experiences around camping and joining clubs in childhood. Pt will continue to work on engaging in peer discussion, support, and feedback.    Status After Intervention:  Unchanged    Participation Level: Active Listener and Interactive    Participation Quality: Appropriate, Attentive, and Sharing      Speech:  normal      Thought Process/Content: Logical      Affective Functioning: Congruent      Mood: euthymic      Level of consciousness:  Alert, Oriented x4, and Attentive      Response to Learning: Able to verbalize current knowledge/experience and Progressing to goal      Endings: None Reported    Modes of Intervention: Support and Socialization      Discipline Responsible: /Counselor      Signature:  MIESHA RAMÍREZ

## 2024-03-28 NOTE — GROUP NOTE
Group Therapy Note    Date: 3/28/2024    Group Start Time:  1:10 PM  Group End Time:  2:00 PM  Group Topic: Psychoeducation    [facility]    Nayana Barrett MSW        Group Therapy Note    Writer facilitated psychoeducation group on anxiety. Writer opened providing pts space for finishing processing around a video on shame from the prior group. Writer provided education on anxiety and different treatments for anxiety. Writer prompted pts to identify examples of ways to manage anxiety. Writer provided education on exposure hierarchies and prompted pts to develop their own. Writer encouraged pts to share and discuss their responses.    Attendees: 8       Patient's Goal:   learning skills and interventions to manage anxiety.    Notes:  Pt presented as attentive as evidenced by appropriate eye contact. Pt discussed belief she did not correctly complete the handout and presented as receptive to education to support better understanding. Pt will continue to work on learning skills and interventions to manage anxiety.    Status After Intervention:  Unchanged    Participation Level: Active Listener and Interactive    Participation Quality: Appropriate, Attentive, and Sharing      Speech:  normal      Thought Process/Content: Logical      Affective Functioning: Congruent      Mood: euthymic      Level of consciousness:  Alert, Oriented x4, and Attentive      Response to Learning: Able to verbalize current knowledge/experience and Progressing to goal      Endings: None Reported    Modes of Intervention: Education      Discipline Responsible: /Counselor      Signature:  MIESHA RAMÍREZ

## 2024-03-28 NOTE — GROUP NOTE
Group Therapy Note    Date: 3/28/2024    Group Start Time: 10:40 AM  Group End Time: 11:30 AM  Group Topic: Cognitive Skills    [facility]    Sal Capellan LCSW        Group Therapy Note    This writer facilitated the cognitive skills group. The patients were encouraged to participate in an ice breaker activity as an introduction to a new peer. The patients were encouraged to be attentive as Lonell LCSW, presented about wellness and resources    Attendees: 9       Patient's Goal:  Participate in ice breaker activity, be attentive to presentation       Notes: The patient participated in a welcome ice breaker activity for new peer. The patient was attentive to the presenter. The patient will continue to be open to resources and guest speakers.     Status After Intervention:  Unchanged    Participation Level: Active Listener and Interactive    Participation Quality: Appropriate, Attentive, and Sharing      Speech:  normal      Thought Process/Content: Logical      Affective Functioning: Congruent      Mood: euthymic      Level of consciousness:  Alert, Oriented x4, and Attentive      Response to Learning: Able to verbalize current knowledge/experience and Capable of insight      Endings: None Reported    Modes of Intervention: Education and Clarifying      Discipline Responsible: /Counselor      Signature:  Sal Capellan LCSW
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
## 2024-03-29 ENCOUNTER — HOSPITAL ENCOUNTER (OUTPATIENT)
Facility: HOSPITAL | Age: 24
Setting detail: RECURRING SERIES
Discharge: HOME | End: 2024-04-01
Payer: MEDICAID

## 2024-03-29 VITALS
OXYGEN SATURATION: 99 % | HEART RATE: 89 BPM | SYSTOLIC BLOOD PRESSURE: 119 MMHG | DIASTOLIC BLOOD PRESSURE: 99 MMHG | TEMPERATURE: 98 F

## 2024-03-29 PROCEDURE — 90853 GROUP PSYCHOTHERAPY: CPT

## 2024-03-29 NOTE — GROUP NOTE
Group Therapy Note    Date: 3/29/2024    Group Start Time:  2:00 PM  Group End Time:  2:45 PM  Group Topic: Wrap-Up    [facility]    Sal Capellan LCSW        Group Therapy Note    The patient's were encouraged to complete the afternoon wrap up assessment. The patients were provided a copy of their safety plan for review and update. The patients were encouraged to participate in a farewell activity for a discharging patient.     Attendees: 8       Patient's Goal:  Identify mood, participate in farewell activity       Notes: The patient completed the afternoon wrap up sheet and denied SI/HI. The patient accepted and reviewed their treatment plan.  The patient participated in the farewell activity. The patient will continue to identify mood and participate in group activity.     Status After Intervention:  Unchanged    Participation Level: Active Listener and Interactive    Participation Quality: Appropriate, Attentive, Sharing, and Supportive      Speech:  normal      Thought Process/Content: Logical      Affective Functioning: Congruent      Mood: euthymic      Level of consciousness:  Alert, Oriented x4, and Attentive      Response to Learning: Able to verbalize current knowledge/experience, Capable of insight, and Progressing to goal      Endings: None Reported    Modes of Intervention: Support, Socialization, and Activity      Discipline Responsible: /Counselor      Signature:  Sal Capellan LCSW

## 2024-03-29 NOTE — GROUP NOTE
Group Therapy Note    Date: 3/29/2024    Group Start Time: 12:00 PM  Group End Time:  1:00 PM  Group Topic: Relaxation    [facility]    Sal Capellan LCSW        Group Therapy Note    This writer facilitated a relaxation and mindfulness group. The patient's were provided mindfulness journaling prompts and were provided space and time outside to journal. The patients were encouraged to return to the group room and discuss their experience journaling.     Attendees: 10       Patient's Goal:  Practice mindfulness through journaling       Notes: The patient participated in mindfulness journaling exercise. The patient discussed the experience upon return to the group room.  The patient was supportive of peer who felt embarrassed about sharing journal entry and shared personal experience gaining comfort in the group. The patient will continue to practice mindfulness during the relaxation group.     Status After Intervention:  Unchanged    Participation Level: Active Listener and Interactive    Participation Quality: Appropriate, Attentive, and Sharing      Speech:  normal      Thought Process/Content: Logical      Affective Functioning: Congruent      Mood: euthymic      Level of consciousness:  Alert, Oriented x4, and Attentive      Response to Learning: Able to verbalize current knowledge/experience, Capable of insight, and Progressing to goal      Endings: None Reported    Modes of Intervention: Support, Socialization, and Activity      Discipline Responsible: /Counselor      Signature:  Sal Capellan LCSW

## 2024-03-29 NOTE — GROUP NOTE
Group Therapy Note    Date: 3/29/2024    Group Start Time:  9:40 AM  Group End Time: 10:30 AM  Group Topic: Process Group - Outpatient    [facility]    Carina Garcia MSW        Group Therapy Note    Attendees: 10    Writer facilitated process group this morning. Writer opened by checking in with patients and inviting anyone who had anything to bring to the group to share. Multiple patients did, and writer facilitated resulting discussions around self harm, interpersonal relationships, spirituality, and intrusive thoughts.            Patient's Goal:  Participate in group therapy, engage in open sharing and processing with peers.     Notes:  Patient engaged fairly in group this morning. She presented as overall quiet and attentive as her peers shared, as evidenced by appropriate eye contact and nodding. Patient will continue with identified treatment goals in further groups.     Status After Intervention:  Unchanged    Participation Level: Active Listener and Interactive    Participation Quality: Appropriate and Attentive      Speech:  normal      Thought Process/Content: Logical      Affective Functioning: Congruent      Mood: euthymic      Level of consciousness:  Alert and Oriented x4      Response to Learning: Able to verbalize current knowledge/experience, Capable of insight, and Progressing to goal      Endings: None Reported    Modes of Intervention: Support, Socialization, and Exploration      Discipline Responsible: /Counselor      Signature:  MIESHA Perez

## 2024-03-29 NOTE — GROUP NOTE
Group Therapy Note    Date: 3/29/2024    Group Start Time:  9:00 AM  Group End Time:  9:45 AM  Group Topic: Community Meeting    [facility]    Sal Capellan LCSW        Group Therapy Note    This writer facilitated the morning community group. The patient's were encouraged to completed the morning assessment. The patients were encouraged to discuss if they were able to practice their planned coping strategies from the previous evening.     Attendees: 9       Patient's Goal:  Identify mood, reflect on previous evening        Notes: The patient completed the morning assessment. The patient participated in discussion and reflection of the previous evening. The patient shared that she visited a friend the previous evening and that it was a good time for her. The patient will continue to identify mood and reflect in the community group.     Status After Intervention:  Unchanged    Participation Level: Active Listener    Participation Quality: Appropriate, Attentive, and Sharing      Speech:  Normal      Thought Process/Content: Logical      Affective Functioning: Congruent      Mood: euthymic      Level of consciousness:  Alert, Oriented x4, and Attentive      Response to Learning: Able to verbalize current knowledge/experience, Capable of insight, and Progressing to goal      Endings: None Reported    Modes of Intervention: Support, Socialization, and Exploration      Discipline Responsible: /Counselor      Signature:  Sal Capellan LCSW

## 2024-03-29 NOTE — GROUP NOTE
Group Therapy Note    Date: 3/29/2024    Group Start Time: 10:40 AM  Group End Time: 11:30 AM  Group Topic: Cognitive Skills    [facility]    Nayana Barrett MSW        Group Therapy Note    Writer facilitated cognitive skills group on increasing positive experiences. Writer opened by prompting pts to identify barriers to positive experiences and encouraged peer discussion. Writer provided education using a handout on ways to increase positive experiences. Writer encouraged pts to relate the education to their lives and discuss.    Attendees: 10       Patient's Goal:  increasing positive experiences.    Notes:  Pt presented as attentive and engaged as evidenced by spontaneous participation. Pt asked appropriate questions. Pt discussed activities she would like to do more as well as navigating maintaining relationships in a healthy way. Pt will continue to work on increasing positive experiences.    Status After Intervention:  Unchanged    Participation Level: Active Listener and Interactive    Participation Quality: Appropriate, Attentive, and Sharing      Speech:  normal      Thought Process/Content: Logical      Affective Functioning: Congruent      Mood: euthymic      Level of consciousness:  Alert, Oriented x4, and Attentive      Response to Learning: Able to verbalize current knowledge/experience and Progressing to goal      Endings: None Reported    Modes of Intervention: Education      Discipline Responsible: /Counselor      Signature:  MIESHA RAMÍREZ

## 2024-03-29 NOTE — GROUP NOTE
Group Therapy Note    Date: 3/29/2024    Group Start Time:  1:10 PM  Group End Time:  2:00 PM  Group Topic: Psychoeducation    [facility]    Carina Garcia MSW        Group Therapy Note    Attendees: 9    Writer facilitated psychoeducation group this afternoon centered around creative expression. Writer opened group by providing patients with art supplies, and asked them to create a self portrait. Writer explained that it could be a traditional self portrait or not--it was not required to include a face, but should be a representation of their self. Writer concluded group with patients sharing their art, which continued into the next group.        Patient's Goal:  Participate in group therapy, engage in creative expression to create a reflective self portrait.     Notes:  Patient engaged well in group this afternoon. She did not get to share her self portrait in this group, but presented as focused and attentive to the activity. She interacted well with her peers throughout as evidenced by smiling and conversing. Patient will continue with identified treatment goals in further groups.     Status After Intervention:  Improved    Participation Level: Active Listener and Interactive    Participation Quality: Appropriate and Attentive      Speech:  normal      Thought Process/Content: Logical      Affective Functioning: Congruent      Mood: euthymic      Level of consciousness:  Alert and Oriented x4      Response to Learning: Able to verbalize current knowledge/experience, Capable of insight, and Progressing to goal      Endings: None Reported    Modes of Intervention: Socialization, Exploration, and Activity      Discipline Responsible: /Counselor      Signature:  MIESHA Perez

## 2024-04-01 ENCOUNTER — HOSPITAL ENCOUNTER (OUTPATIENT)
Facility: HOSPITAL | Age: 24
Setting detail: RECURRING SERIES
Discharge: HOME | End: 2024-04-04
Payer: MEDICAID

## 2024-04-01 VITALS
DIASTOLIC BLOOD PRESSURE: 74 MMHG | SYSTOLIC BLOOD PRESSURE: 118 MMHG | OXYGEN SATURATION: 99 % | TEMPERATURE: 98.2 F | HEART RATE: 79 BPM

## 2024-04-01 PROCEDURE — 90853 GROUP PSYCHOTHERAPY: CPT
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
## 2024-04-01 NOTE — BH NOTE
OUTPATIENT PHYSICIAN PROGRESS NOTE      Chief Complaint/Symptoms/Impairments (as noted in Treatment Plan): DIA, PTSD    Criteria for Continued Treatment (check all that apply):   [x] Preventing Decompensation   [x] Improving Level of Functioning  [x] Reducing Isolative Behaviors  [] Understanding Diagnosis and Need for Medications  [] Improving Treatment/Medication Compliance  [] Confronting Denial of Illness  [] Stabilizing Level of Functioning  [] Improving Emotional/Social/Cognitive Functioning  [] Decreasing Frequency of Hospitalizations    Suicidal/Homicidal ideations:   [x] Absent  [] Present  [] Passive  [] Intent  [] Plan  [] Death Wishes      CURRENT CONDITION OF PATIENT & PROGRESS ON TREATMENT PLAN    Subjective (ongoing complaints by patient regarding symptom severity, presentation, affect, function, etc.):  Kaylee Berrios reports she is doing ok today. Says she is tolerating initiation of buspar. Anxiety is still an ongoing problem, says it has worsened. Her biggest stressor are her issues at home. She is willing to increase buspar. Not sleeping the best, atarax has been helping. Denies si hi or avh. Says the program has been beneficial to her. Calm and pleasant.      Behavior (side effects, changes in mental status, objective observations seen in individual sessions or by staff): Kaylee Berrios is calm cooperative, clear and coherent with speech of average rate volume and tone.  Moods are fair.  Affect is fair range. Appropriately dressed and groomed.  Denies SI/HI/AH/VH.  No aggression or violence.  Appropriately interactive and aware.  Insight is good and judgement is fairly good.        Assessment/Plan (functional improvement and/or ongoing impairment, response to treatment, plan for future ongoing treatment and medication management to include revisions):     4/1: increase buspar to 5 mg bid.  Continue current care  Groups milieu and individual therapy  Medication modification as

## 2024-04-01 NOTE — GROUP NOTE
Group Therapy Note    Date: 4/1/2024    Group Start Time:  2:00 PM  Group End Time:  2:45 PM  Group Topic: Wrap-Up    [facility]    Carina Garcia, MSW        Group Therapy Note    Attendees: 8    Writer facilitated wrap up group this afternoon. Writer opened by encouraging patients to fill out their wrap up sheets. Writer then provided patients with an emotion exploration activity, which asked them to identify an emotion they feel frequently and process the physical sensations, how they express that emotion, and their associations with that emotion. Writer then facilitated sharing. Once all had shared, writer asked patients to each share a gratitude from the weekend to end the group.        Patient's Goal:  Participate in group therapy, complete wrap up sheet, identify an emotion and explore different aspects of that emotion, identify a gratitude from the weekend.     Notes:  Patient engaged minimally in group this afternoon. She completed her check out sheet, declining any SI or HI. She identified middling levels of anger and depression and high level of anxiety. Patient stepped out of group shortly after completing check out sheet and did not return; was with her therapist during that time. Patient will continue with identified treatment goals in further groups.     Status After Intervention:  Unchanged    Participation Level: Active Listener and Interactive    Participation Quality: Appropriate and Attentive      Speech:  normal      Thought Process/Content: Logical      Affective Functioning: Congruent      Mood: anxious      Level of consciousness:  Alert and Oriented x4      Response to Learning: Able to verbalize current knowledge/experience, Capable of insight, and Progressing to goal      Endings: None Reported    Modes of Intervention: Support, Socialization, and Exploration      Discipline Responsible: Social

## 2024-04-01 NOTE — GROUP NOTE
Group Therapy Note    Date: 4/1/2024    Group Start Time:  1:10 PM  Group End Time:  2:00 PM  Group Topic: Psychoeducation    [facility]    Anna Laguna        Group Therapy Note  This writer facilitated a psychoeducational group that educated patients on the mental health benefits of exercise. This writer discussed how exercise can help with mental illness, self-esteem, stress, and overall physical health. Patients were then asked to complete a schedule where they planned to exercise at least three times a week and list the exercises they planned to try. Patients then engaged in discussion regarding their current exercise schedule and how exercising makes them feel. This writer used open ended questions to prompt further discussion.     Attendees: 6       Patient's Goal: To learn about the mental health benefits of exercise.      Notes:  The patient presented well to the session. The patient stepped out to speak with the physician and returned to the group shortly. The patient appeared to attentively listen to the writer discuss the mental health benefits of exercise and ways to incorporate exercise into their routine. The patient then stepped out of the group and did not return before the end. The patient will continue to work on identified treatment goals in further groups.     Status After Intervention:      Participation Level:     Participation Quality:       Speech:      Thought Process/Content:       Affective Functioning:       Mood:     Level of consciousness:        Response to Learning:       Endings:     Modes of Intervention:       Discipline Responsible: /Counselor      Signature:  IMESHA Tan Student.

## 2024-04-01 NOTE — GROUP NOTE
Group Therapy Note    Date: 4/1/2024    Group Start Time: 10:40 AM  Group End Time: 11:30 AM  Group Topic: Cognitive Skills    [facility]    Carina Garcia MSW        Group Therapy Note    Attendees: 7    Writer facilitated cognitive skills group this morning. Writer opened by asking if any of the patients had anything else they wanted to process prior to moving on, and facilitated some processing around family relationships and asking for support. Writer then moved into the [place] activity, asking patients to identify an overarching goal for themselves as well as the barriers and the supports. Writer then facilitated sharing for those who were willing.        Patient's Goal:  Participate in group therapy, engage in open sharing with peers, identify an overarching goal that they are struggling with as well as some barriers and supports of that goal.     Notes:  Patient engaged well in group this morning. She shared some insight for a processing peer on how to reach out and ask for help when needed, reflecting that she often feels like a burden when doing this but not because any of her positive supports ever tell her that. Patient interacted well with peers throughout and will continue with identified treatment goals in further groups.     Status After Intervention:  Unchanged    Participation Level: Active Listener and Interactive    Participation Quality: Appropriate, Attentive, and Supportive      Speech:  normal      Thought Process/Content: Logical      Affective Functioning: Congruent      Mood: anxious      Level of consciousness:  Alert and Oriented x4      Response to Learning: Able to verbalize current knowledge/experience, Capable of insight, and Progressing to goal      Endings: None Reported    Modes of Intervention: Support, Exploration, and Activity      Discipline Responsible: /Counselor      Signature:  Carina

## 2024-04-01 NOTE — GROUP NOTE
Group Therapy Note    Date: 4/1/2024    Group Start Time: 12:00 PM  Group End Time:  1:00 PM  Group Topic: Relaxation    [facility]    Sal Capellan LCSW        Group Therapy Note    The patients were encouraged to participate in an ice breaker activity with new peer. The patients were encouraged to participate in a gratitude journal activity outside of the clinic. The patients were encouraged  to return to the clinic to discuss experience journaling.       Attendees: 7       Patient's Goal: Welcome a new peer, journaling activity.       Notes: The patient participated in the ice breaker activity to welcome new peers. The patient participated in the journaling activity. The patient met with psychiatrist and practiced self-soothing during end of the group.     Status After Intervention:  Unchanged    Participation Level: Active Listener and Interactive    Participation Quality: Appropriate, Attentive, Sharing, and Supportive      Speech:  normal      Thought Process/Content: Logical      Affective Functioning: Congruent      Mood: euthymic      Level of consciousness:  Alert, Oriented x4, and Attentive      Response to Learning: Able to verbalize current knowledge/experience, Capable of insight, and Progressing to goal      Endings: None Reported    Modes of Intervention: Support, Socialization, and Activity      Discipline Responsible: /Counselor      Signature:  Sal Capelaln LCSW

## 2024-04-01 NOTE — GROUP NOTE
Group Therapy Note    Date: 4/1/2024    Group Start Time:  9:00 AM  Group End Time:  9:40 AM  Group Topic: Community Meeting    [facility]    Nayana Barrett MSW        Group Therapy Note    Writer facilitated the morning check in community group focused on evaluating presentation, assessing for safety, and processing recent events Writer facilitated a symptom and safety check in using the morning check in form.  Writer encouraged patients to share events in their lives, identify triggers for difficult emotions, and identify ways in which the patients perceive themselves to be making progress. Writer prompted and supported peer feedback     Attendees: 4       Patient's Goal:  disclosing safety concerns and participating in peer discussion and support.    Notes:  Pt denied SI/TIRSO/HI on check in form. Pt presented as attentive and engaged as evidenced by spontaneous participation. Pt provided appropriate feedback to peers. Pt shared about meeting people from her past at [employer]. Pt endorsed relating to peers regarding needing noise to sleep. Pt will continue to work on disclosing safety concerns and participating in peer discussion and support.    Status After Intervention:  Unchanged    Participation Level: Active Listener and Interactive    Participation Quality: Appropriate, Attentive, and Sharing      Speech:  normal      Thought Process/Content: Logical      Affective Functioning: Congruent      Mood: euthymic      Level of consciousness:  Alert, Oriented x4, and Attentive      Response to Learning: Able to verbalize current knowledge/experience and Progressing to goal      Endings: None Reported    Modes of Intervention: Support and Socialization      Discipline Responsible: /Counselor      Signature:  MIESHA RAMÍREZ

## 2024-04-01 NOTE — GROUP NOTE
Group Therapy Note    Date: 4/1/2024    Group Start Time:  9:40 AM  Group End Time: 10:30 AM  Group Topic: Process Group - Outpatient    [facility]    Anna Lagnua        Group Therapy Note  This writer facilitated a process group by encouraging the patients to share about any recent thoughts, emotions, feelings, or situations, that they have experienced, in order to gain support and feedback from their peers. During this conversation the topic of setting expectations and reflecting on emotions were discussed. This writer used open ended questions to encourage patients to reflect on their feelings.    Attendees: 6       Patient's Goal:  To process thoughts and feelings and provide support and feedback to peers.     Notes:  The patient presented well to the session. She appeared to attentively listen to her peers as they discussed expectations and finding time to schedule important tasks within the day. The patient engaged in a discussion with her peers regarding finding a job and driving. The patient also provided feedback and support to her peers. The patient will continue to work on processing thoughts and emotions in further groups.     Status After Intervention:  Unchanged    Participation Level: Active Listener and Interactive    Participation Quality: Appropriate, Attentive, Sharing, and Supportive      Speech:  normal      Thought Process/Content: Logical  Linear      Affective Functioning: Congruent      Mood: euthymic      Level of consciousness:  Alert, Oriented x4, and Attentive      Response to Learning: Able to verbalize current knowledge/experience, Able to retain information, and Capable of insight      Endings: None Reported    Modes of Intervention: Support, Socialization, Exploration, and Clarifying      Discipline Responsible: /Counselor      Signature:  MIESHA Tan Student.

## 2024-04-02 ENCOUNTER — HOSPITAL ENCOUNTER (OUTPATIENT)
Facility: HOSPITAL | Age: 24
Setting detail: RECURRING SERIES
Discharge: HOME | End: 2024-04-05
Payer: MEDICAID

## 2024-04-02 VITALS
OXYGEN SATURATION: 100 % | TEMPERATURE: 98 F | DIASTOLIC BLOOD PRESSURE: 78 MMHG | SYSTOLIC BLOOD PRESSURE: 136 MMHG | HEART RATE: 98 BPM

## 2024-04-02 PROCEDURE — 90832 PSYTX W PT 30 MINUTES: CPT

## 2024-04-02 PROCEDURE — 90853 GROUP PSYCHOTHERAPY: CPT

## 2024-04-02 NOTE — PROGRESS NOTES
MEDICATION GROUP THERAPY PROGRESS NOTE     Kaylee Berrios was present for medication group.    GROUP TIME: Tuesday 1:10 PM - 2:00 PM    TOPIC: Anxiety    PERSONAL GOAL FOR PARTICIPATION: To be present for group, participate in discussion, and answer patient-directed questions.    GOAL ORIENTATION: Personal    THERAPEUTIC INTERVENTIONS REVIEWED AND DISCUSSED: The following topics were presented: signs and symptoms of anxiety, known causes of anxiety, treatment options for anxiety including non-pharmacotherapeutic options, pharmacologic treatment options and mechanism of action and side effect profiles for commonly used anti-anxiety agents.  Patients were given time to ask questions regarding their current therapy.     IMPRESSION OF PARTICIPATION:   Kaylee was present for the first part of group.  She asked a lot of great questions surrounding treatment of anxiety and panic attacks.  When group got to topic of medications for anxiety, became overwhelmed and stated she had to leave group.  Did not return to group afterwards.        Karla Oliver, PharmD, BCPS, BCPP  Clinical Pharmacy Specialist, Behavioral Health  Desk: [phone] ([phone])  Pharmacy: [phone] ([phone])
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
## 2024-04-02 NOTE — BH NOTE
4/2/2024    Writer checked on patient prior to group 6 due to her feeling dysregulated. Writer walked pt through breathing exercises and provided feedback and support. Patient presented as more regulated and writer encouraged talking to individual therapist about housing resources. Patient presented as agreeable.     MIESHA Perez
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
## 2024-04-03 ENCOUNTER — HOSPITAL ENCOUNTER (OUTPATIENT)
Facility: HOSPITAL | Age: 24
Setting detail: RECURRING SERIES
Discharge: HOME | End: 2024-04-06
Payer: MEDICAID

## 2024-04-03 VITALS
DIASTOLIC BLOOD PRESSURE: 91 MMHG | SYSTOLIC BLOOD PRESSURE: 136 MMHG | OXYGEN SATURATION: 97 % | TEMPERATURE: 98.3 F | HEART RATE: 100 BPM

## 2024-04-03 PROCEDURE — 90853 GROUP PSYCHOTHERAPY: CPT

## 2024-04-03 NOTE — GROUP NOTE
Group Therapy Note    Date: 4/3/2024    Group Start Time:  9:00 AM  Group End Time:  9:40 AM  Group Topic: Community Meeting    [facility]    Sal Capellan LCSW        Group Therapy Note    This writer facilitated the morning community group. The patients were encouraged to complete the morning assessment to identify mood and and discuss goals. The patients were encouraged to discuss goals with peers.     Attendees: 6       Patient's Goal: Identify mood and set goal for the day       Notes: The patient completed the morning assessment and denied SI/HI. The patient participated in the goal setting discussion. The patient noted she wants to have a \"normal\" day and allowed peers to discuss the concept of normal.  The patient will continue to identify mood and set goals for the day.     Status After Intervention:  Unchanged    Participation Level: Active Listener and Interactive    Participation Quality: Appropriate, Attentive, Sharing, and Supportive      Speech:  normal      Thought Process/Content: Logical      Affective Functioning: Congruent      Mood: euthymic      Level of consciousness:  Alert, Oriented x4, and Attentive      Response to Learning: Able to verbalize current knowledge/experience, Capable of insight, and Progressing to goal      Endings: None Reported    Modes of Intervention: Support, Socialization, and Activity      Discipline Responsible: /Counselor      Signature:  Sal Capellan LCSW

## 2024-04-03 NOTE — GROUP NOTE
Group Therapy Note    Date: 4/3/2024    Group Start Time:  9:40 AM  Group End Time: 10:30 AM  Group Topic: Process Group - Outpatient    [facility]    Carina Garcia MSW        Group Therapy Note    Attendees: 8    Writer facilitated process group this morning. Writer opened by asking if patients had anything they would like to bring to the group and share, which several of them did. Writer facilitated discussions until the end of group.        Patient's Goal:  Participate in group therapy, engage in open sharing and processing with peers.     Notes:  Patient was present briefly at the start of group, but stepped out to answer the phone and did not return for the rest of group. She will continue with identified treatment goals in further groups.     Status After Intervention:  Unchanged    Participation Level: Active Listener    Participation Quality: Appropriate and Attentive      Speech:  normal      Thought Process/Content: Logical      Affective Functioning: Congruent      Mood: depressed      Level of consciousness:  Alert and Oriented x4      Response to Learning: Able to verbalize current knowledge/experience, Capable of insight, and Progressing to goal      Endings: None Reported    Modes of Intervention: Support, Socialization, and Exploration      Discipline Responsible: /Counselor      Signature:  MIESHA Perez
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
## 2024-04-03 NOTE — GROUP NOTE
Group Therapy Note    Date: 4/3/2024    Group Start Time:  1:10 PM  Group End Time:  2:00 PM  Group Topic: Psychoeducation    [facility]    Carina Garcia MSW        Group Therapy Note    Attendees: 9    Writer facilitated psychoeducation group this afternoon centered around managing symptoms of stress. Writer opened by explaining that stress is experienced differently by each person and can look very different for everyone. Writer then handed out activity for patients to identify symptoms of stress that are physical, emotional, and behavioral. Writer wrote these on the board, and then asked patients to share what worked for them for the ones that were the most troublesome. Writer facilitated resulting discussions.        Patient's Goal:  Participate in group therapy, identify symptoms of stress, deepen understanding of ways to address different stress symptoms.     Notes:  Patient engaged fairly in group this afternoon. She interacted well with her peers at the start of group, and presented as overall quiet. She shared that a way to regulate stress for her is to engage supports, including therapists at [facility] and friends. Patient will continue with identified treatment goals in further groups.     Status After Intervention:  Unchanged    Participation Level: Active Listener and Interactive    Participation Quality: Appropriate, Attentive, and Sharing      Speech:  normal      Thought Process/Content: Logical      Affective Functioning: Congruent      Mood: anxious      Level of consciousness:  Alert and Oriented x4      Response to Learning: Able to verbalize current knowledge/experience, Capable of insight, and Progressing to goal      Endings: None Reported    Modes of Intervention: Education, Exploration, and Activity      Discipline Responsible: /Counselor      Signature:  MIESHA Perez

## 2024-04-03 NOTE — BH NOTE
Treatment plan review.    This writer met with the patient to review and update their treatment plan. The patient and this writer reviewed and updated the treatment plan and discussed discharge date.      The patient is meeting goals of group attendance and participation. The patient patient is attentive and has connected with peers in the groups. The patient is meeting goals of attending and participating in individual meetings.  The patient is meeting goals of medication compliance and has met with physician as planned. The patient is utilizing opportunities in and out of group for self-regulation and has allowed staff to intervene.      The patient signed their treatment plan.    Sal Capellan LCSW

## 2024-04-03 NOTE — BH NOTE
This writer left a message for Matt [facility] Therapist at [facility] working with the patient. This writer will continue coordination with patient outpatient providers in preparation for discharge.      Sal Capellan LCSW.

## 2024-04-03 NOTE — GROUP NOTE
Group Therapy Note    Date: 4/3/2024    Group Start Time: 10:40 AM  Group End Time: 11:30 AM  Group Topic: Cognitive Skills    [facility]    Sal Capellan LCSW        Group Therapy Note    This writer facilitated the cognitive skills group. The patient's were encouraged to self-disclose by participation in a \"story dice\" activity.       Attendees: 8       Patient's Goal:  Self-disclosure       Notes: The patient provided supportive feedback to peers, and entered the group too late to participate in acitivity.  The patient will continue to self-disclose during the cognitive skills group.     Status After Intervention:  Unchanged    Participation Level: Active Listener and Interactive    Participation Quality: Appropriate, Attentive, Sharing, and Supportive      Speech:  normal      Thought Process/Content: Logical      Affective Functioning: Congruent      Mood: euthymic      Level of consciousness:  Alert, Oriented x4, and Attentive      Response to Learning: Able to verbalize current knowledge/experience, Capable of insight, and Progressing to goal      Endings: None Reported    Modes of Intervention: Support, Socialization, and Activity      Discipline Responsible: /Counselor      Signature:  Sal Capellan LCSW

## 2024-04-03 NOTE — GROUP NOTE
Group Therapy Note    Date: 4/3/2024    Group Start Time:  2:00 PM  Group End Time:  2:45 PM  Group Topic: Wrap-Up    [facility]    Sal Capellan LCSW        Group Therapy Note    This writer facilitated the wrap up group. The patients were encouraged to complete the afternoon wrap up sheet identifying mood and reflecting on the day.       Attendees: 8       Patient's Goal:  Identify mood and plan for self-care       Notes: The patient completed the afternoon assessment and denied SI/HI. The patient completed an afternoon agenda and shared plans for self-care this afternoon. The patient shared uncertainty about her evening and did not participate in reflection when prompted.  The patient will continue to identify mood and plan for self-care.     Status After Intervention:  Unchanged    Participation Level: Active Listener and Interactive    Participation Quality: Appropriate, Attentive, Sharing, and Supportive      Speech:  normal      Thought Process/Content: Logical      Affective Functioning: Congruent      Mood: euthymic      Level of consciousness:  Alert, Oriented x4, and Attentive      Response to Learning: Able to verbalize current knowledge/experience and Progressing to goal      Endings: None Reported    Modes of Intervention: Support, Socialization, and Activity      Discipline Responsible: /Counselor      Signature:  Sal Capellan LCSW

## 2024-04-04 ENCOUNTER — HOSPITAL ENCOUNTER (OUTPATIENT)
Facility: HOSPITAL | Age: 24
Setting detail: RECURRING SERIES
Discharge: HOME | End: 2024-04-07
Payer: MEDICAID

## 2024-04-04 VITALS
DIASTOLIC BLOOD PRESSURE: 89 MMHG | SYSTOLIC BLOOD PRESSURE: 124 MMHG | HEART RATE: 100 BPM | TEMPERATURE: 98.7 F | OXYGEN SATURATION: 100 %

## 2024-04-04 DIAGNOSIS — F43.10 PTSD (POST-TRAUMATIC STRESS DISORDER): Primary | ICD-10-CM

## 2024-04-04 PROCEDURE — 90853 GROUP PSYCHOTHERAPY: CPT

## 2024-04-04 RX ORDER — LORAZEPAM 0.5 MG/1
0.5 TABLET ORAL DAILY
Qty: 15 TABLET | Refills: 0 | Status: SHIPPED | OUTPATIENT
Start: 2024-04-04 | End: 2024-04-19

## 2024-04-04 NOTE — BH NOTE
OUTPATIENT PHYSICIAN PROGRESS NOTE      Chief Complaint/Symptoms/Impairments (as noted in Treatment Plan): DIA, PTSD    Criteria for Continued Treatment (check all that apply):   [x] Preventing Decompensation   [x] Improving Level of Functioning  [x] Reducing Isolative Behaviors  [] Understanding Diagnosis and Need for Medications  [] Improving Treatment/Medication Compliance  [] Confronting Denial of Illness  [] Stabilizing Level of Functioning  [] Improving Emotional/Social/Cognitive Functioning  [] Decreasing Frequency of Hospitalizations    Suicidal/Homicidal ideations:   [x] Absent  [] Present  [] Passive  [] Intent  [] Plan  [] Death Wishes      CURRENT CONDITION OF PATIENT & PROGRESS ON TREATMENT PLAN    Subjective (ongoing complaints by patient regarding symptom severity, presentation, affect, function, etc.):  Kaylee Berrios reports she is feeling well today. She is tolerating buspar 5 mg bid and asked if she could increase it to 10 mg bid. She is also requesting for ativan refill. Says she takes a quarter of .25 mg of it. She is educated about the long term use of benzo such as cognitive impairment, rebound anxiety. Denies si hi or avh.      Behavior (side effects, changes in mental status, objective observations seen in individual sessions or by staff): Kaylee Berrios is calm cooperative, clear and coherent with speech of average rate volume and tone.  Moods are fair.  Affect is fair range. Appropriately dressed and groomed.  Denies SI/HI/AH/VH.  No aggression or violence.  Appropriately interactive and aware.  Insight is good and judgement is fairly good.        Assessment/Plan (functional improvement and/or ongoing impairment, response to treatment, plan for future ongoing treatment and medication management to include revisions):     4/4: ativan sent to pharmacy 15 tabs  4/1: increase buspar to 5 mg bid.  Continue current care  Groups milieu and individual therapy  Medication modification as

## 2024-04-04 NOTE — GROUP NOTE
Group Therapy Note    Date: 4/4/2024    Group Start Time: 10:40 AM  Group End Time: 11:30 AM  Group Topic: Process Group - Outpatient    [facility]    Sal Capellan LCSW        Group Therapy Note      This writer facilitated the process group. The patients were encouraged to continue discussion on affirmations at the start of the group. The patients were encouraged to self-disclose and provide feedback and support to one another. The patient discussed free will vs lack of choice as well as discussed responsibility.      Attendees: 8       Patient's Goal:  Self-disclosure, and participation in group discussion       Notes: The patient participated in a group discussion about free will and personal responsibility. The patient shared that she can be passionate bout this topic and believe people should be held accountable for their actions. The patient will continue to self-disclose in the process group.     Status After Intervention:  Unchanged    Participation Level: Active Listener and Interactive    Participation Quality: Appropriate, Attentive, and Sharing      Speech:  normal      Thought Process/Content: Logical      Affective Functioning: Congruent      Mood: euthymic      Level of consciousness:  Alert, Oriented x4, and Attentive      Response to Learning: Able to verbalize current knowledge/experience, Capable of insight, and Progressing to goal      Endings: None Reported    Modes of Intervention: Support, Socialization, and Activity      Discipline Responsible: /Counselor      Signature:  Sal Capellan LCSW
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
## 2024-04-05 ENCOUNTER — HOSPITAL ENCOUNTER (OUTPATIENT)
Facility: HOSPITAL | Age: 24
Setting detail: RECURRING SERIES
Discharge: HOME | End: 2024-04-08
Payer: MEDICAID

## 2024-04-05 VITALS
HEART RATE: 94 BPM | TEMPERATURE: 98.1 F | OXYGEN SATURATION: 100 % | DIASTOLIC BLOOD PRESSURE: 96 MMHG | SYSTOLIC BLOOD PRESSURE: 122 MMHG

## 2024-04-05 PROCEDURE — 90853 GROUP PSYCHOTHERAPY: CPT

## 2024-04-05 NOTE — GROUP NOTE
Group Therapy Note    Date: 4/5/2024    Group Start Time:  9:40 AM  Group End Time: 10:30 AM  Group Topic: Process Group - Outpatient    [facility]    Anna Laguna        Group Therapy Note  This writer facilitated a process group by encouraging the patients to share about any recent thoughts, emotions, feelings, or situations, that they have experienced, in order to gain support and feedback from their peers. During this conversation the topic of safety, existentialism, and coping skills were discussed. This writer used open ended questions to encourage patients to reflect on their feelings.    Attendees: 10       Patient's Goal:  To process thoughts and feelings and provide support and feedback to peers.     Notes:  The patient presented well to the session. The patient discussed employment and her plans for the weekend. The patient then engaged in discussion with her peers regarding taking care of mental health challenges while working and factors that affect work such as coworkers. The patient will continue to work on identified treatment goals in further groups.     Status After Intervention:  Unchanged    Participation Level: Active Listener and Interactive    Participation Quality: Appropriate, Attentive, Sharing, and Supportive      Speech:  normal      Thought Process/Content: Logical  Linear      Affective Functioning: Congruent      Mood: euthymic      Level of consciousness:  Alert, Oriented x4, and Attentive      Response to Learning: Able to verbalize current knowledge/experience, Able to retain information, and Capable of insight      Endings: None Reported    Modes of Intervention: Support, Socialization, Exploration, and Clarifying      Discipline Responsible: /Counselor      Signature:  MIESHA Tan Student.

## 2024-04-05 NOTE — GROUP NOTE
Group Therapy Note    Date: 4/5/2024    Group Start Time:  9:00 AM  Group End Time:  9:40 AM  Group Topic: Community Meeting    [facility]    Sal Capellan LCSW        Group Therapy Note    This writer facilitated the welcome group. The patients were encouraged to complete the welcome assessment to identify mood and not any safety concerns. The patients discussed their previous evenings with the group.       Attendees: 9       Patient's Goal: Identify mood and discuss previous evening       Notes: The patients were encouraged to complete the morning assessment. The patient discussed their previous evenings with peers and the use of coping strategies. The patient shared details of her evening with peers. The patient will continue to identify mood and self-disclose in the community group.     Status After Intervention:  Unchanged    Participation Level: Active Listener and Interactive    Participation Quality: Appropriate, Attentive, Sharing, and Supportive      Speech:  normal      Thought Process/Content: Logical      Affective Functioning: Congruent      Mood: euthymic      Level of consciousness:  Alert, Oriented x4, and Attentive      Response to Learning: Able to verbalize current knowledge/experience and Progressing to goal      Endings: None Reported    Modes of Intervention: Support, Socialization, and Activity      Discipline Responsible: /Counselor      Signature:  Sal Capellan LCSW

## 2024-04-05 NOTE — GROUP NOTE
Group Therapy Note    Date: 4/5/2024    Group Start Time:  2:00 PM  Group End Time:  2:45 PM  Group Topic: Wrap-Up    [facility]    Carina Garcia, MSW        Group Therapy Note    Attendees: 9    Writer facilitated wrap up group this afternoon. Writer opened by encouraging patients to fill out their wrap up sheets and providing them with their safety plans to make changes as needed. Writer then facilitated sharing of the art project that patients had completed in the previous group. Once each patient shared, writer asked them all to share what they would be doing for self care this weekend and facilitated sharing until the end of group.        Patient's Goal:  Participate in group therapy, complete check out sheet, engage in sharing of creative expression project from previous group, identify an item of self care that they plan to do this weekend.     Notes:  Patient engaged well in group this afternoon. She filled out her check out sheet, declining any SI or HI. She identified middling levels of depression and anger and a higher level of anxiety. Patient shared that for her creative expression activity, she had painted a leaf and vine when she was feeling very anxious, and once she felt calmer she painted herself firing a duran. Patient shared that for self care this weekend she plans to spend time with friends. Patient will continue with identified treatment goals in further groups.     Status After Intervention:  Improved    Participation Level: Active Listener and Interactive    Participation Quality: Appropriate, Attentive, and Sharing      Speech:  normal      Thought Process/Content: Logical      Affective Functioning: Congruent      Mood: euthymic      Level of consciousness:  Alert and Oriented x4      Response to Learning: Able to verbalize current knowledge/experience, Capable of insight, and Progressing to goal      Endings: None
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
## 2024-04-05 NOTE — GROUP NOTE
Group Therapy Note    Date: 4/5/2024    Group Start Time:  1:10 PM  Group End Time:  2:00 PM  Group Topic: Psychoeducation    [facility]    Anna Laguna        Group Therapy Note  This writer facilitated a psychoeducational group that encouraged patients to reflect on their current feelings using color, shapes, lines, or words,  in comparison to what they hope would happen in the future. Patients were asked to reflect on the different use of colors, lines, and contrast. would like to be using art. Patients were provided with art items to complete the activity as this writer used music to encourage reflection of emotions.      Attendees: 10       Patient's Goal:  To reflect on emotions and hope.     Notes:  The patient presented well to the session. The patient was provided with a blank canvas, paint, and paintbrushes. The patient was observed using colors of brown, red, and green to create her painting. The patient will continue to work on identified treatment goals in further groups.     Status After Intervention:  Unchanged    Participation Level: Active Listener and Interactive    Participation Quality: Appropriate, Attentive, and Sharing      Speech:  normal      Thought Process/Content: Logical  Linear      Affective Functioning: Congruent      Mood: euthymic      Level of consciousness:  Alert, Oriented x4, and Attentive      Response to Learning: Able to verbalize current knowledge/experience, Able to retain information, and Capable of insight      Endings: None Reported    Modes of Intervention: Support, Socialization, Exploration, and Activity      Discipline Responsible: /Counselor      Signature:  MIESHA Tan Student.

## 2024-04-08 ENCOUNTER — HOSPITAL ENCOUNTER (OUTPATIENT)
Facility: HOSPITAL | Age: 24
Setting detail: RECURRING SERIES
Discharge: HOME | End: 2024-04-11
Payer: MEDICAID

## 2024-04-08 VITALS
TEMPERATURE: 98.3 F | OXYGEN SATURATION: 100 % | HEART RATE: 90 BPM | SYSTOLIC BLOOD PRESSURE: 124 MMHG | DIASTOLIC BLOOD PRESSURE: 89 MMHG

## 2024-04-08 PROCEDURE — 90853 GROUP PSYCHOTHERAPY: CPT

## 2024-04-08 RX ORDER — BUSPIRONE HYDROCHLORIDE 5 MG/1
5 TABLET ORAL 2 TIMES DAILY
Qty: 60 TABLET | Refills: 0 | Status: SHIPPED | OUTPATIENT
Start: 2024-04-08 | End: 2024-04-11

## 2024-04-08 NOTE — GROUP NOTE
Group Therapy Note    Date: 4/8/2024    Group Start Time: 12:00 PM  Group End Time:  1:00 PM  Group Topic: Psychoeducation    [facility]    Tala Tanner MSW        Group Therapy Note    Patients were given educational materials about coping skills for addiction. Patients were asked to identify unhealthy coping skills and behavior patterns they would like to address, and were encouraged to identify ways they use different types of coping strategies. Patients were asked to complete a \"Combat Cravings Action Plan\" to identify incremental coping skills for unwanted behaviors.     Attendees: 8       Patient's Goal:  Identify unwanted behaviors, identify coping skills for cravings and urges    Notes:  The patient did not attend relapse prevention group. The patient came in briefly and left for the remainder of the group. The patient did not participate in group discussion. The patient will continue to practice engaging in groups.    Status After Intervention:  N/A: Did not attend    Participation Level: N/A: Did not attend    Participation Quality: N/A: Did not attend      Speech:  N/A: Did not attend      Thought Process/Content: N/A: Did not attend      Affective Functioning: N/A: Did not attend      Mood: N/A: Did not attend      Level of consciousness:  N/A: Did not attend      Response to Learning: N/A: Did not attend      Endings: N/A: Did not attend    Modes of Intervention: N/A: Did not attend      Discipline Responsible: /Counselor      Signature:  MIESHA Garrison

## 2024-04-08 NOTE — GROUP NOTE
Group Therapy Note    Date: 4/8/2024    Group Start Time:  2:00 PM  Group End Time:  2:45 PM  Group Topic: Wrap-Up    [facility]    Carina Garcia, MSW        Group Therapy Note    Attendees: 7    Writer facilitated wrap up group this afternoon. Writer opened by encouraging patients to fill out their wrap up sheets. Writer then asked patients two ice breaker questions, as there were several patients newer to the group. Writer then facilitated a guided meditation for patients that was centered around letting go of thoughts and anxiety. Writer asked patients how they were feeling afterward and facilitated discussion until the end of group.        Patient's Goal:  Participate in group therapy, build rapport with peers, engage in mindfulness meditation.     Notes:  Patient engaged well in group this afternoon. She filled out her check out sheet ,declining any SI or HI. She identified middling levels of depression and anger and a higher level of anxiety. Patient participated well in the ice breakers and shared that if she were a tool she would be the set of tools required to fire a duran. Patient shared that if she were a breakfast food she would be avocado toast. Writer observed patient engaging in the meditation. She will continue with identified treatment goals in further groups.     Status After Intervention:  Improved    Participation Level: Active Listener and Interactive    Participation Quality: Appropriate, Attentive, and Sharing      Speech:  normal      Thought Process/Content: Logical      Affective Functioning: Congruent      Mood: euthymic      Level of consciousness:  Alert and Oriented x4      Response to Learning: Able to verbalize current knowledge/experience, Capable of insight, and Progressing to goal      Endings: None Reported    Modes of Intervention: Support, Socialization, and Exploration      Discipline Responsible: Social

## 2024-04-08 NOTE — GROUP NOTE
Group Therapy Note    Date: 4/8/2024    Group Start Time:  9:00 AM  Group End Time:  9:40 AM  Group Topic: Community Meeting    [facility] Anna Hernandez        Group Therapy Note  This writer facilitated a community group that encouraged patients to check in with how their weekend went and how they were feeling today. Patients were asked to complete the check-in form. After allowing time for this, this writer had patients reflect on what would an opportunity, change, and help look like for them. This writer used a strengths based approach and open ended questions to prompt discussion.     Attendees: 7       Patient's Goal:  To complete check-in form and engage in discussion.    Notes:  The patient presented well to the session. The patient completed the check in form and did not indicate any SI, self-harm, or harm to others. The patient shared that opportunity that would help her currently is employment that paid well. The patient then engaged in discussion with her peers regarding finances, living wages, and housing. The patient will continue to work on identified treatment goals in further groups.     Status After Intervention:  Unchanged    Participation Level: Active Listener and Interactive    Participation Quality: Appropriate, Attentive, Sharing, and Supportive      Speech:  normal      Thought Process/Content: Logical  Linear      Affective Functioning: Congruent      Mood: euthymic      Level of consciousness:  Alert, Oriented x4, and Attentive      Response to Learning: Able to verbalize current knowledge/experience, Able to retain information, and Capable of insight      Endings: None Reported    Modes of Intervention: Support, Socialization, Exploration, and Clarifying      Discipline Responsible: /Counselor      Signature:  MIESHA Tan Student.

## 2024-04-08 NOTE — GROUP NOTE
Group Therapy Note    Date: 4/8/2024    Group Start Time:  9:40 AM  Group End Time: 10:30 AM  Group Topic: Process Group - Outpatient    [facility]    Anna Laguna        Group Therapy Note  This writer facilitated a process group by encouraging the patients to share about any recent thoughts, emotions, feelings, or situations, that they have experienced, in order to gain support and feedback from their peers. During this conversation the topic of boundaries and control were discussed. This writer used open ended questions to encourage patients to reflect on their feelings.    Attendees: 7       Patient's Goal:  To process thoughts and feelings and provide support and feedback to peers.    Notes:  The patient presented well to the session.The patient discussed coworkers and boundaries with her peers. The patient appeared receptive to feedback as she nodded her head. This writer then encouraged the patient to process her reactions to different scenarios that could happen. The patient shared her potential reactions and her expectations. The patient will continue to work on identified treatment goals in further groups.     Status After Intervention:  Unchanged    Participation Level: Active Listener and Interactive    Participation Quality: Appropriate, Attentive, Sharing, and Supportive      Speech:  normal      Thought Process/Content: Logical  Linear      Affective Functioning: Congruent      Mood: euthymic      Level of consciousness:  Alert, Oriented x4, and Attentive      Response to Learning: Able to verbalize current knowledge/experience, Able to retain information, and Capable of insight      Endings: None Reported    Modes of Intervention: Support, Socialization, Exploration, and Clarifying      Discipline Responsible: /Counselor      Signature:  MIESHA Tan Student.

## 2024-04-08 NOTE — GROUP NOTE
Group Therapy Note    Date: 4/8/2024    Group Start Time:  1:10 PM  Group End Time:  2:00 PM  Group Topic: Psychoeducation    [facility] Anna Hernandez        Group Therapy Note  This writer facilitated a psychoeducational group that educated patients on guilt versus shame. This writer provided education by discussing the difference between helpful guilt, unhelpful guilt, and shame. Patients were asked to discuss a time where they felt guilt or shame. This writer then encouraged patients to reflect on how to work with feelings of guilt or shame. This writer used open ended questions and a strengths based approach to promote discussion.    Attendees: 7       Patient's Goal:  To learn about the difference between guilt versus shame.     Notes:  The patient was not present for majority of group. The patient arrived to the group within the last fifteen minutes.     Status After Intervention:      Participation Level:     Participation Quality:       Speech:        Thought Process/Content:       Affective Functioning:       Mood:       Level of consciousness:        Response to Learning:       Endings:     Modes of Intervention:       Discipline Responsible: /Counselor      Signature:  MIESHA Tan Student.

## 2024-04-09 ENCOUNTER — HOSPITAL ENCOUNTER (OUTPATIENT)
Facility: HOSPITAL | Age: 24
Setting detail: RECURRING SERIES
Discharge: HOME | End: 2024-04-12
Payer: MEDICAID

## 2024-04-09 VITALS
DIASTOLIC BLOOD PRESSURE: 96 MMHG | OXYGEN SATURATION: 97 % | SYSTOLIC BLOOD PRESSURE: 125 MMHG | HEART RATE: 79 BPM | TEMPERATURE: 98.1 F

## 2024-04-09 PROCEDURE — 90853 GROUP PSYCHOTHERAPY: CPT

## 2024-04-09 NOTE — GROUP NOTE
Group Therapy Note    Date: 4/9/2024    Group Start Time:  9:00 AM  Group End Time:  9:40 AM  Group Topic: Community Meeting    [facility] Anna Hernandez        Group Therapy Note  This writer facilitated a community group that encouraged patients to check in with how they were feeling today. Patients were asked to complete the check-in form. After allowing time for this, this writer had patients reflect on where they feel different emotions in their body. This writer used open ended questions and reflective statements to prompt discussion.    Attendees: 8       Patient's Goal:  To complete check-in form and identify emotions in the body.    Notes:  The patient presented well to the session. The patient completed the check in form and did not indicate any SI, self-harm, or harm to others. The patient then completed the activity and was asked to reflect on the colors chosen and where in the body emotions of sadness, anger, fear, and love are felt. The patient engaged in discussion with her peers regarding similar chosen colors and sensations felt in the body. The patient will continue to work on identified treatment goals in further groups.     Status After Intervention:  Unchanged    Participation Level: Active Listener and Interactive    Participation Quality: Appropriate, Attentive, and Sharing      Speech:  normal      Thought Process/Content: Logical  Linear      Affective Functioning: Congruent      Mood: euthymic      Level of consciousness:  Alert, Oriented x4, and Attentive      Response to Learning: Able to verbalize current knowledge/experience, Able to retain information, and Capable of insight      Endings: None Reported    Modes of Intervention: Support, Socialization, Exploration, and Clarifying      Discipline Responsible: /Counselor      Signature:  MIESHA Tan Student.

## 2024-04-09 NOTE — GROUP NOTE
Group Therapy Note    Date: 4/9/2024    Group Start Time:  9:40 AM  Group End Time: 10:30 AM  Group Topic: Process Group - Outpatient    [facility]    Johan Mackey MSW        Group Therapy Note    This writer facilitated group and opened with encouraging peers to process an event, thoughts, and/emotion they may want to vent about and get feedback. Patients supported peers struggling with physical ailments, graduation, and concerns with housing.       Attendees: 8        Patient's Goal: to process emotions, to provide feedback to peers     Notes:  Patient was present and engaged in group. Patient processed fearing going to the eye doctor because of her concussion. Patient was receptive to feedback and supportive of peers. Patient will continue to work towards goal.    Status After Intervention:  Improved    Participation Level: Active Listener    Participation Quality: Appropriate      Speech:  normal      Thought Process/Content: Logical      Affective Functioning: Congruent      Mood: anxious      Level of consciousness:  Alert and Oriented x4      Response to Learning: Capable of insight and Progressing to goal      Endings: None Reported    Modes of Intervention: Support, Socialization, and Exploration      Discipline Responsible: /Counselor      Signature:  MIESHA Hernandez

## 2024-04-09 NOTE — GROUP NOTE
Group Therapy Note    Date: 4/9/2024    Group Start Time: 12:00 PM  Group End Time:  1:00 PM  Group Topic: Psychotherapy    [facility] Anna Hernandez        Group Therapy Note  This writer facilitated a treatment planning group by providing education around the inner critic. This writer discussed the 7 different types of inner critic and the purpose of the inner critic. Patients were then asked to reflect on their inner critic regarding common criticisms, impact, and strength. This writer used open ended questions, strengths-based approach, clarifying, and reflective statements.     Attendees: 7       Patient's Goal:  To learn about the inner critic and its impact.    Notes:  The patient was not present for majority of group. When patient presented to the group, she was observed holding her head and looking down toward the floor.     Status After Intervention:  Unchanged    Participation Level: Active Listener    Participation Quality: Appropriate and Attentive      Speech:  normal      Thought Process/Content: Logical  Linear      Affective Functioning: Congruent      Mood: irritable      Level of consciousness:  Alert, Oriented x4, and Attentive      Response to Learning: Capable of insight      Endings: None Reported    Modes of Intervention: Education and Support      Discipline Responsible: /Counselor      Signature:  MIESHA Tan Student.

## 2024-04-09 NOTE — BH NOTE
OUTPATIENT PHYSICIAN PROGRESS NOTE      Chief Complaint/Symptoms/Impairments (as noted in Treatment Plan): \"I\"m doing better\" taking Buspar 10 mg BID, no side effects    Criteria for Continued Treatment (check all that apply):   [x] Preventing Decompensation   [x] Improving Level of Functioning  [] Reducing Isolative Behaviors  [] Understanding Diagnosis and Need for Medications  [] Improving Treatment/Medication Compliance  [] Confronting Denial of Illness  [] Stabilizing Level of Functioning  [x] Improving Emotional/Social/Cognitive Functioning  [] Decreasing Frequency of Hospitalizations    Suicidal/Homicidal ideations:   [x] Absent  [] Present  [] Passive  [] Intent  [] Plan  [] Death Wishes      CURRENT CONDITION OF PATIENT & PROGRESS ON TREATMENT PLAN    Subjective (ongoing complaints by patient regarding symptom severity, presentation, affect, function, etc.): Reports she feels Buspar 'might be helping some\" and feeling more optimistic      Behavior (side effects, changes in mental status, objective observations seen in individual sessions or by staff): She is appropriately dressed and groomed. Good EC. Denies SI/HI. More reactive affect.      Assessment/Plan (functional improvement and/or ongoing impairment, response to treatment, plan for future ongoing treatment and medication management to include revisions): increase Buspar to 15 mg BID        [] NO NEW Medications at this time.   [x] New Medication prescribed and prescription provided to patient  [] PHP Nurse notified of changes as needed    [x] Regarding any medications: patient instructed on purpose, benefits, side effects,   risks, and alternative treatments. Patient verbalizes understanding of instructions and has had the opportunity to ask questions.    Rachael Suggs MD  04/09/24   11:27 AM

## 2024-04-09 NOTE — GROUP NOTE
Group Therapy Note    Date: 4/9/2024    Group Start Time: 10:40 AM  Group End Time: 11:30 AM  Group Topic: Cognitive Skills    [facility]    Johan Mackey MSW        Group Therapy Note    This writer facilitated group and opened with a reflection on strengths and values. Patients were encouraged to complete a worksheet on identifying strengths and reflect on it with peers. This writer introduced second worksheet and walked them through multiple examples of identifying which values under anxious thoughts. Patients worked on them together and reflected as a group.       Attendees: 7        Patient's Goal: to identify cognitive distortions, to re frame thoughts, to improve self-esteem       Notes:  Patient was present and alert in group. Patient left for a ten minute group and returned appearing depressed. Patient was hesitant to complete worksheet but did so when prompted. Patient will continue to be encouraged towards goal.     Status After Intervention:  Unchanged    Participation Level: Active Listener    Participation Quality: Appropriate      Speech:  normal      Thought Process/Content: Logical      Affective Functioning: Congruent      Mood: anxious      Level of consciousness:  Alert      Response to Learning: Progressing to goal      Endings: None Reported    Modes of Intervention: Education      Discipline Responsible: /Counselor      Signature:  MIESHA Hernandez

## 2024-04-10 ENCOUNTER — HOSPITAL ENCOUNTER (OUTPATIENT)
Facility: HOSPITAL | Age: 24
Setting detail: RECURRING SERIES
Discharge: HOME | End: 2024-04-13
Payer: MEDICAID

## 2024-04-10 VITALS
DIASTOLIC BLOOD PRESSURE: 90 MMHG | OXYGEN SATURATION: 100 % | SYSTOLIC BLOOD PRESSURE: 131 MMHG | TEMPERATURE: 98.2 F | HEART RATE: 88 BPM

## 2024-04-10 PROCEDURE — 90853 GROUP PSYCHOTHERAPY: CPT

## 2024-04-10 PROCEDURE — 90832 PSYTX W PT 30 MINUTES: CPT

## 2024-04-10 NOTE — GROUP NOTE
Group Therapy Note    Date: 4/10/2024    Group Start Time: 10:44 AM  Group End Time: 11:30 AM  Group Topic: Cognitive Skills    [facility]    Nayana Barrett MSW        Group Therapy Note    Writer facilitated cognitive skills group. Writer opened with brief introductions due to there being multiple pts that had not yet met the writer. Writer utilized the red and green flags handout to discuss components of healthy interpersonal relationships. Writer encouraged engagement through direct prompts, having peers read aloud from the handout, open ended questions, and reflective statements. Writer encouraged peers to relate the information to their lived experiences and discuss shared experiences.    Attendees: 10       Patient's Goal:   processing and understanding relationship red and green flags.     Notes:   Pt presented as attentive and engaged as evidenced by spontaneous participation. Pt participated appropriately in introductions. Pt discussed boundaries with respect. Pt discussed her preference for \"brutal honesty\" and not drawing out issues which need addressing. Pt will continue to work on processing and understanding relationship red and green flags.     Status After Intervention:  Unchanged    Participation Level: Active Listener and Interactive    Participation Quality: Appropriate, Attentive, and Sharing      Speech:  normal      Thought Process/Content: Logical      Affective Functioning: Congruent      Mood: euthymic      Level of consciousness:  Alert, Oriented x4, and Attentive      Response to Learning: Able to verbalize current knowledge/experience and Progressing to goal      Endings: None Reported    Modes of Intervention: Education and Exploration      Discipline Responsible: /Counselor      Signature:  MIESHA RAMÍREZ

## 2024-04-10 NOTE — BH NOTE
Pt came to writer at 12:58 asking writer to take O2 reading due to pt having smoked a cigarette and been outside with pollen, and feeling unsure of her oxygen level. Pt oxygen level was 100. Pt thanked writer and left.    MIESHA Garrison

## 2024-04-10 NOTE — GROUP NOTE
Group Therapy Note    Date: 4/10/2024    Group Start Time:  9:00 AM  Group End Time:  9:40 AM  Group Topic: Community Meeting    [facility]    Carina Garcia MSW        Group Therapy Note    Attendees: 9    Writer facilitated check in group this morning. Writer opened by encouraging patients to fill out their check in sheets. Writer then moved into the warm up activity: an autobiography in 5 short chapters. Several of the patients had done this activity before, so writer asked them instead to instead write a 6 word memoir and share. Writer encouraged sharing until the end of group.        Patient's Goal:  Participate in group therapy, complete check in sheet, engage in open sharing with peers.     Notes:  Patient engaged well in group this morning. She filled out her check in sheet, declining any SI or thoughts of self harm. Patient endorsed low levels of depression and anger and a higher level of anxiety. She shared that her 6 word memoir was \"exist to live, live for peace\". She interacted well with her peers and will continue with identified treatment goals in further groups.     Status After Intervention:  Unchanged    Participation Level: Active Listener and Interactive    Participation Quality: Appropriate, Attentive, and Sharing      Speech:  normal      Thought Process/Content: Logical      Affective Functioning: Congruent      Mood: anxious      Level of consciousness:  Alert and Oriented x4      Response to Learning: Able to verbalize current knowledge/experience, Capable of insight, and Progressing to goal      Endings: None Reported    Modes of Intervention: Support, Socialization, and Exploration      Discipline Responsible: /Counselor      Signature:  MIESHA Perez

## 2024-04-10 NOTE — GROUP NOTE
Group Therapy Note    Date: 4/10/2024    Group Start Time: 12:00 PM  Group End Time:  1:00 PM  Group Topic: Psychotherapy    [facility]    Johan Mackey MSW        Group Therapy Note    This writer facilitated group and opened with displaying a video suggested by a peer on a bird flying for the first time after being in a cage for most of its life. Patients watched and reflected on the ways they can relate to the bird. This writer introduced group topic and displayed a brief video explaining the difference between self-esteem and self-compassion. Patients watched and reflected on personal self-esteem. To conclude patients were encouraged to brainstorm ways they can introduce more self-compassion into their lives.       Attendees: 7      Patient's Goal: to identify coping skills, to improve self compassion, to improve well-being       Notes:  Patient was present the first thirty minutes of group, stepped out, and returned for the last ten minutes of group. Patient did not verbally engage with peers but was actively listening to them. Patient will continue to be encouraged towards goal.     Status After Intervention:  Unchanged    Participation Level: Minimal    Participation Quality: Appropriate      Speech:  normal      Thought Process/Content: Logical      Affective Functioning: Congruent      Mood: depressed      Level of consciousness:  Alert      Response to Learning: Progressing to goal      Endings: None Reported    Modes of Intervention: Education and Support      Discipline Responsible: /Counselor      Signature:  MIESHA Hernandez

## 2024-04-10 NOTE — GROUP NOTE
Group Therapy Note    Date: 4/10/2024    Group Start Time:  2:00 PM  Group End Time:  2:45 PM  Group Topic: Wrap-Up    [facility]    Nayana Barrett MSW        Group Therapy Note    Writer facilitated a community wrap up group focused on assessing for safety, coping skills practice, and planning ways to engage in healthy coping while in the community. Writer facilitated a symptom and safety check in using the afternoon check in form. Writer prompted pts to complete evening self-care routines and encouraged pts to share resources. Writer utilized the anger ball to support discussion and processing of healthy coping. Writer provided education on the importance of developing strong coping over hoping for an easy life.       Attendees: 10       Patient's Goal:  disclosing safety concerns and identifying healthy coping skills to use in the community.      Notes:  Pt presented as attentive and alert. Pt denied SI/HI/TIRSO on the check in form. Pt encouraged a peer who expressed interest in trying a new coping skill. Pt will continue to work on disclosing safety concerns and identifying healthy coping skills to use in the community.      Status After Intervention:  Unchanged    Participation Level: Active Listener and Interactive    Participation Quality: Appropriate and Attentive      Speech:  normal      Thought Process/Content: Logical      Affective Functioning: Congruent      Mood: euthymic      Level of consciousness:  Alert, Oriented x4, and Attentive      Response to Learning: Able to verbalize current knowledge/experience and Progressing to goal      Endings: None Reported    Modes of Intervention: Support and Problem-solving      Discipline Responsible: /Counselor      Signature:  MIESHA RAMÍREZ

## 2024-04-10 NOTE — GROUP NOTE
Group Therapy Note    Date: 4/10/2024    Group Start Time:  1:10 PM  Group End Time:  2:00 PM  Group Topic: Psychoeducation    [facility]    Carina Garcia MSW        Group Therapy Note    Attendees: 10    Writer co-facilitated group with peer recovery specialists Joseph and Eliseo. Eliseo prompted patients to describe themselves in one word, and went around the group asking each patient to share. This prompted discussion of identity. Eliseo then asked patients to share some of their greatest barriers, which prompted good discussion that lasted until the end of group.        Patient's Goal:  Participate in group therapy, engage in discussion with .     Notes:  Patient was pulled for individual session during this group.     Status After Intervention:  Unchanged    Participation Level: Minimal    Participation Quality: Appropriate and Attentive      Speech:  normal      Thought Process/Content: Logical      Affective Functioning: Congruent      Mood: anxious      Level of consciousness:  Alert and Oriented x4      Response to Learning: Able to retain information, Capable of insight, and Progressing to goal      Endings: None Reported    Modes of Intervention: Education, Support, and Exploration      Discipline Responsible: /Counselor      Signature:  MIESHA Perez

## 2024-04-10 NOTE — BH NOTE
Partial Hospitalization Program Individual Psychotherapy Note      Diagnosis: F39    Goal: Individual session and review of treatment plan        Psychotherapy Session    Start time: 1:20  Stop time: 1:50        Patient Mental Status and Mood/Affect:Calm and Congruent    Patient Behavior and Appearance: Cooperativeshows no evidence of impairment    Intervention/Techniques: Informed, Validated/Supported, Reflected, Challenged, Reframed, Promoted Peer Support, and Provided Feedback    Focus of Session/Patient Response and Progress Towards Goal:     Patient actively participated and engaged with clinician to collaborate on updating treatment plan and discussing progress.  Patient denied SI/HI. Patient is making progress towards goal as evidenced by self-report and staff observation.       Narrative:     Patient reported feeling \" good\" and when prompted to explain further identified feeling apprehensive along with hopeful. Patient shared this past week has gone fairly well and she is afraid that something will happen to ruin that. This writer validated patient and challenged her to think about pain and growth existing at the same time along with the statement healing is not linear. Patient reflected and shared it makes sense and she will try her best to hold on to that.      Patient and writer reviewed treatment plan. The pt has been making progress towards both short term goals. The patient has been engaged in groups, individuals, and medication management.       Short term goal #1:Pt. has attended and focused on topics in group. Pt shared she is drawing while she is triggered in groups to allow herself to be present. Pt. has met with individual therapist and participated in goal setting . Pt shared her biggest one is to stay in groups and practice disterss tolerance.     Short term goal #2: Pt. has complied with medications and has met with psych weekly. Pt reports medications are \" good\" and regulating panic attack.

## 2024-04-10 NOTE — GROUP NOTE
Group Therapy Note    Date: 4/10/2024    Group Start Time:  9:40 AM  Group End Time: 10:30 AM  Group Topic: Process Group - Outpatient    [facility]    Johan Mackey MSW        Group Therapy Note      This writer facilitated group and opened with encouraging peers to process an event, thought, or emotion they would like to vent or receive feedback. Patients processed fears around health and reflected on what it means to be in survival mode vs not.       Attendees: 10        Patient's Goal: to process thoughts, emotions, and behaviors, to provide support to peers         Notes:  Patient was present and engaged in group. Patient related to peer having a hard time coming out of survival mode due to her living situation. Patient was receptive to feedback and supportive towards peers. Patient will continue to work towards goal.    Status After Intervention:  Improved    Participation Level: Active Listener    Participation Quality: Appropriate      Speech:  normal      Thought Process/Content: Logical      Affective Functioning: Congruent      Mood: anxious      Level of consciousness:  Alert and Oriented x4      Response to Learning: Capable of insight and Progressing to goal      Endings: None Reported    Modes of Intervention: Support, Socialization, and Exploration      Discipline Responsible: /Counselor      Signature:  MIESHA Hernandez

## 2024-04-11 ENCOUNTER — HOSPITAL ENCOUNTER (OUTPATIENT)
Facility: HOSPITAL | Age: 24
Setting detail: RECURRING SERIES
Discharge: HOME | End: 2024-04-14
Payer: MEDICAID

## 2024-04-11 VITALS
DIASTOLIC BLOOD PRESSURE: 83 MMHG | OXYGEN SATURATION: 100 % | HEART RATE: 85 BPM | SYSTOLIC BLOOD PRESSURE: 132 MMHG | TEMPERATURE: 98.1 F

## 2024-04-11 PROCEDURE — 90832 PSYTX W PT 30 MINUTES: CPT

## 2024-04-11 PROCEDURE — 90853 GROUP PSYCHOTHERAPY: CPT

## 2024-04-11 RX ORDER — BUSPIRONE HYDROCHLORIDE 5 MG/1
15 TABLET ORAL 2 TIMES DAILY
Qty: 180 TABLET | Refills: 0 | Status: SHIPPED | OUTPATIENT
Start: 2024-04-11 | End: 2024-05-11

## 2024-04-11 NOTE — GROUP NOTE
Group Therapy Note    Date: 4/11/2024    Group Start Time: 12:00 PM  Group End Time:  1:00 PM  Group Topic: Psychotherapy    [facility]    Carina Garcia MSW        Group Therapy Note    Attendees: 8    Writer facilitated psychotherapy group this afternoon centered around reflection and journaling. Writer provided patients with guided journaling activity asking them to reflect on an event or a time in their life that felt unresolved, and then to write a letter to their past self. Writer provided patients with time to write, and then time to reflect, which lasted until the end of group.        Patient's Goal:  Participate in group therapy, engage in self reflection, write a letter to their past self.    Notes:  Patient engaged fairly in group this afternoon. She presented as engaged and interactive, but writer noted her drawing during the time allotted for writing. Patient did not get to share during this group but will continue with identified treatment goals in further groups.     Status After Intervention:  Unchanged    Participation Level: Active Listener    Participation Quality: Appropriate and Attentive      Speech:  normal      Thought Process/Content: Logical      Affective Functioning: Congruent      Mood: euthymic      Level of consciousness:  Alert and Oriented x4      Response to Learning: Able to verbalize current knowledge/experience, Capable of insight, and Progressing to goal      Endings: None Reported    Modes of Intervention: Support, Exploration, and Activity      Discipline Responsible: /Counselor      Signature:  MIESHA Perez

## 2024-04-11 NOTE — GROUP NOTE
Group Therapy Note    Date: 4/11/2024    Group Start Time:  9:00 AM  Group End Time:  9:40 AM  Group Topic: Community Meeting    [facility]    Carina Garcia MSW        Group Therapy Note    Attendees: 6    Writer facilitated check in group this morning. Writer opened by encouraging patients to fill out their check in sheets. Patients were conversant this morning; writer encouraged them to check in with each other and facilitated resulting discussions. Writer asked patients an icebreaker question to build rapport: what was their favorite field trip while they were in school? And facilitated sharing until the end of group.        Patient's Goal:  Participate in group therapy, complete check in sheet, build rapport and share openly with peers.     Notes:  Patient engaged well in group this morning. She filled out her check in sheet, declining any SI or thoughts of self harm. Patient identified low levels of depression and anger and higher levels of anxiety. Patient shared that her favorite field trip growing up was to the [place], as she lived in [place]. She shared there was a  there and they got to see sand crabs. Patient interacted well with peers throughout and will continue with identified treatment goals in further groups.     Status After Intervention:  Unchanged    Participation Level: Active Listener and Interactive    Participation Quality: Appropriate, Attentive, and Sharing      Speech:  normal      Thought Process/Content: Logical      Affective Functioning: Congruent      Mood: anxious      Level of consciousness:  Alert and Oriented x4      Response to Learning: Able to verbalize current knowledge/experience, Capable of insight, and Progressing to goal      Endings: None Reported    Modes of Intervention: Support, Socialization, and Exploration      Discipline Responsible: /Counselor      Signature:  Carina

## 2024-04-11 NOTE — GROUP NOTE
Group Therapy Note    Date: 4/11/2024    Group Start Time:  9:40 AM  Group End Time: 10:30 AM  Group Topic: Process Group - Outpatient    [facility]    Johan Mackey MSW        Group Therapy Note    This writer facilitated process group and encouraged peers to reflect on a situation, emotion, or thought they might need feedback on or just need to vent. Patients processed peers struggling with masking at work, judgmental families, and grief.       Attendees: 9        Patient's Goal: to identify emotions, to process emotions, to connect with peers    Notes:  Patient was present and engaged in group. Patient was supportive towards peers and encouraged them to recognize the impact they have had on the group in a positive way. Patient will continue to be encouraged towards goal.    Status After Intervention:  Improved    Participation Level: Active Listener    Participation Quality: Appropriate      Speech:  normal      Thought Process/Content: Logical      Affective Functioning: Congruent      Mood: anxious      Level of consciousness:  Alert and Oriented x4      Response to Learning: Progressing to goal      Endings: None Reported    Modes of Intervention: Support, Socialization, and Exploration      Discipline Responsible: /Counselor      Signature:  MIESHA Hernandez

## 2024-04-11 NOTE — GROUP NOTE
Group Therapy Note    Date: 4/11/2024    Group Start Time:  2:05 PM  Group End Time:  2:45 PM  Group Topic: Wrap-Up    [facility]    Nayana Barrett MSW        Group Therapy Note    Writer facilitated a community wrap up group focused on assessing for safety and processing of the sunflower activity. Writer facilitated a symptom and safety check in using the afternoon check in form. Writer provided additional time for pts to complete the sunflower activity from the previous group. Writer encouraged pts to share what they put, relate to peers, and process shared experiences.     Attendees: 8       Patient's Goal:   disclosing safety concerns and engaging in processing with peers.     Notes:   Pt presented as attentive and alert. Pt denied SI/HI/TIRSO on the check in form. Pt did not share though was observed to work on the activity. Pt will continue to work on disclosing safety concerns and engaging in processing with peers.     Status After Intervention:  Unchanged    Participation Level: Minimal    Participation Quality: Appropriate and Attentive      Speech:  normal      Thought Process/Content: Logical      Affective Functioning: Congruent      Mood: anxious      Level of consciousness:  Alert, Oriented x4, and Attentive      Response to Learning: Progressing to goal      Endings: None Reported    Modes of Intervention: Support and Socialization      Discipline Responsible: /Counselor      Signature:  MIESHA RAMÍREZ

## 2024-04-11 NOTE — BH NOTE
OUTPATIENT PHYSICIAN PROGRESS NOTE      Chief Complaint/Symptoms/Impairments (as noted in Treatment Plan): \"I'm doing OK\" patient reports anxiety less in AM after starting Buspar    Criteria for Continued Treatment (check all that apply):   [] Preventing Decompensation   [] Improving Level of Functioning  [] Reducing Isolative Behaviors  [] Understanding Diagnosis and Need for Medications  [] Improving Treatment/Medication Compliance  [] Confronting Denial of Illness  [] Stabilizing Level of Functioning  [] Improving Emotional/Social/Cognitive Functioning  [] Decreasing Frequency of Hospitalizations    Suicidal/Homicidal ideations:   [] Absent  [] Present  [] Passive  [] Intent  [] Plan  [] Death Wishes      CURRENT CONDITION OF PATIENT & PROGRESS ON TREATMENT PLAN    Subjective (ongoing complaints by patient regarding symptom severity, presentation, affect, function, etc.): \"I feel like the medicine might be helping, I'm not having any side effects\" Denies SI or HI tolerating stress of living with dad      Behavior (side effects, changes in mental status, objective observations seen in individual sessions or by staff): Appropriate dress and grooming. EC good. Speech fluent and spontaneous. Denies SI or HI. TP linear.      Assessment/Plan (functional improvement and/or ongoing impairment, response to treatment, plan for future ongoing treatment and medication management to include revisions): continue Buspar 15 mg PO BID        [x] NO NEW Medications at this time.   [] New Medication prescribed and prescription provided to patient  [] PHP Nurse notified of changes as needed    [x] Regarding any medications: patient instructed on purpose, benefits, side effects,   risks, and alternative treatments. Patient verbalizes understanding of instructions and has had the opportunity to ask questions.    Rachael Suggs MD  04/11/24   12:11 PM

## 2024-04-11 NOTE — GROUP NOTE
Group Therapy Note    Date: 4/11/2024    Group Start Time: 10:40 AM  Group End Time: 11:30 AM  Group Topic: Cognitive Skills    [facility]    Nayana Barrett MSW        Group Therapy Note    Writer facilitated cognitive skills group. Writer opened with an ice breaker by patient request to support transition after difficult topics the prior group as well as support building group rapport. Writer transitioned to psychoeducation on cognitive distortions. Writer provided education on core beliefs and types of cognitive distortions using a handout. Writer encouraged engagement through prompting peers to read from the handouts for each other as well as supporting pts in processing how the education connects to their lived experiences.    Attendees: 9       Patient's Goal:  understanding and identifying cognitive distortions.    Notes:  Pt presented as attentive and engaged as evidenced by spontaneous participation. Pt engaged appropriately with peers and staff during the ice breaker. Pt presented as more quiet though attentive during the remainder of group. Pt read from peers aloud. Pt will continue to work on understanding and identifying cognitive distortions.    Status After Intervention:  Unchanged    Participation Level: Active Listener and Interactive    Participation Quality: Appropriate, Attentive, and Sharing      Speech:  normal      Thought Process/Content: Logical      Affective Functioning: Congruent      Mood: euthymic      Level of consciousness:  Alert, Oriented x4, and Attentive      Response to Learning: Able to verbalize current knowledge/experience and Progressing to goal      Endings: None Reported    Modes of Intervention: Education and Socialization      Discipline Responsible: /Counselor      Signature:  MIESHA RAMÍREZ

## 2024-04-11 NOTE — BH NOTE
Partial Hospitalization Program Individual Psychotherapy Note      Diagnosis: F40 unspecified mood disorder     Goal: 3.1, applying skills to increase sharing about trauma in groups        Psychotherapy Session    Start time: 1320  Stop time: 1355        Patient Mental Status and Mood/Affect:Anxious and Congruent    Patient Behavior and Appearance: Cooperative, Tense, and Poor eye contactshows no evidence of impairment    Intervention/Techniques: Informed, Validated/Supported, Reflected, Listened/Empathized, Promoted Peer Support, Provided Feedback, and Problem Solved    Focus of Session/Patient Response and Progress Towards Goal: Writer met with pt due pt request. Pt presented as having a trauma response. Session focused on goal 3.1, applying skills to increase sharing about trauma in groups.    Narrative: Pt approached writer asking to talk. Pt presented as agitated and anxious. Pt reported feeling numb, having self-harm urges without intent, and having frequent trauma flash backs. Pt repeatedly apologized to the writer and stated \"I don't know what's wrong with me.\" Pt presented as receptive to validation and affirmations that the pt was safe. Across time of repetition, pt moved from tearful and shaking to regulated. Writer provided reflections of pt's trauma history and relation to present triggers. Pt discussed finding a peer's presentation as triggering while acknowledging \"its nothing personal to him, he can't help it.\" Pt reported struggling with sharing about her trauma in groups, debating whether it would be helpful. Pt expressed struggling with how to move forward with her trauma healing. Writer provided brief education on trauma healing and encouraged pt to practice opening up in groups. Pt identified barriers to group sharing. Writer suggested the following to address the identified barriers: homework for the pt to write out what she wants to share with the group, staff to specifically prompt and hold

## 2024-04-12 ENCOUNTER — HOSPITAL ENCOUNTER (OUTPATIENT)
Facility: HOSPITAL | Age: 24
Setting detail: RECURRING SERIES
Discharge: HOME | End: 2024-04-15
Payer: MEDICAID

## 2024-04-12 VITALS
SYSTOLIC BLOOD PRESSURE: 140 MMHG | OXYGEN SATURATION: 100 % | TEMPERATURE: 98 F | HEART RATE: 81 BPM | DIASTOLIC BLOOD PRESSURE: 92 MMHG

## 2024-04-12 PROCEDURE — 90853 GROUP PSYCHOTHERAPY: CPT

## 2024-04-12 NOTE — GROUP NOTE
Group Therapy Note    Date: 4/12/2024    Group Start Time:  9:40 AM  Group End Time: 10:30 AM  Group Topic: Process Group - Outpatient    [facility]    Johan Mackey MSW        Group Therapy Note    This writer facilitated group and opened with asking patients to continue their reflections of GLADS activity done in previous groups. Patients shared their answers and the delights prompt led to a discussion on comfort tv shows/movies. To conclude, this writer asked pt to choose a song that represents how they are feeling right now. This writer played the song and prompted discussion.       Attendees: 8        Patient's Goal: to process emotions, to engage with peers       Notes:  Patient was present and engaged in group. Patient shard her GLADS and identified staying in group the most she has ever this past week as an accomplishment. Patient participated in activity and was supportive towards peers. Patient will continue to work towards goal.    Status After Intervention:  Improved    Participation Level: Active Listener    Participation Quality: Appropriate      Speech:  normal      Thought Process/Content: Logical      Affective Functioning: Congruent      Mood: anxious      Level of consciousness:  Alert and Oriented x4      Response to Learning: Capable of insight and Progressing to goal      Endings: None Reported    Modes of Intervention: Support, Socialization, and Exploration      Discipline Responsible: /Counselor      Signature:  MIESHA Hernandez

## 2024-04-12 NOTE — BH NOTE
This writer pulled patient for hand off and informed that pt's case will be transferred to MIESHA Perez. Patient reflected on feelings and was receptive to the transition as evidenced by verbal and nonverbal cues.

## 2024-04-12 NOTE — BH NOTE
OUTPATIENT PHYSICIAN PROGRESS NOTE      Chief Complaint/Symptoms/Impairments (as noted in Treatment Plan): \"I'm doing better I think\" has been taking Buspar    Criteria for Continued Treatment (check all that apply):   [] Preventing Decompensation   [x] Improving Level of Functioning  [] Reducing Isolative Behaviors  [] Understanding Diagnosis and Need for Medications  [] Improving Treatment/Medication Compliance  [] Confronting Denial of Illness  [x] Stabilizing Level of Functioning  [] Improving Emotional/Social/Cognitive Functioning  [] Decreasing Frequency of Hospitalizations    Suicidal/Homicidal ideations:   [x] Absent  [] Present  [] Passive  [] Intent  [] Plan  [] Death Wishes      CURRENT CONDITION OF PATIENT & PROGRESS ON TREATMENT PLAN    Subjective (ongoing complaints by patient regarding symptom severity, presentation, affect, function, etc.): Reports improved overall anxiety, denies side effects. Participating in groups.      Behavior (side effects, changes in mental status, objective observations seen in individual sessions or by staff): Appropriately dressed and groomed, good EC. Appears relaxed. Denies SI or HI.      Assessment/Plan (functional improvement and/or ongoing impairment, response to treatment, plan for future ongoing treatment and medication management to include revisions): Continue Buspar        [x] NO NEW Medications at this time.   [] New Medication prescribed and prescription provided to patient  [] PHP Nurse notified of changes as needed    [x] Regarding any medications: patient instructed on purpose, benefits, side effects,   risks, and alternative treatments. Patient verbalizes understanding of instructions and has had the opportunity to ask questions.    Rachael Suggs MD  04/12/24   10:27 AM

## 2024-04-12 NOTE — GROUP NOTE
Group Therapy Note    Date: 4/12/2024    Group Start Time: 10:40 AM  Group End Time: 11:30 AM  Group Topic: Cognitive Skills    [facility] Anna Hernandez        Group Therapy Note    This writer facilitated a cognitive skills group on empathy versus sympathy. This writer showed patients a video that explained the difference between being empathetic versus sympathetic. After viewing this, patients were asked to reflect on the difference and how they feel when they receive each one. Patients engaged in discussion regarding their experiences and how they treat themselves in comparison to how they would treat a friend. This writer used a strengths based approach and open ended     Attendees: 8       Patient's Goal:  To learn about the difference between sympathy and empathy.    Notes:  The patient presented well to the session. The patient shared her thoughts on the difference between empathy and sympathy. The patient also reflected on being able to give friends rico whereas it is more challenging to be kind and say nice things to herself. This writer discussed methods of self care and how to practice speaking nice by looking at situations objectively. The patient will continue to work on identified treatment goals in further groups.     Status After Intervention:  Unchanged    Participation Level: Active Listener and Interactive    Participation Quality: Appropriate, Attentive, Sharing, and Supportive      Speech:  normal      Thought Process/Content: Logical  Linear      Affective Functioning: Congruent      Mood: euthymic      Level of consciousness:  Alert, Oriented x4, and Attentive      Response to Learning: Able to verbalize current knowledge/experience, Able to verbalize/acknowledge new learning, Able to retain information, and Capable of insight      Endings: None Reported    Modes of Intervention: Education, Support,

## 2024-04-12 NOTE — GROUP NOTE
Group Therapy Note    Date: 4/12/2024    Group Start Time:  9:00 AM  Group End Time:  9:40 AM  Group Topic: Community Meeting    [facility]    Craina Garcia MSW        Group Therapy Note    Attendees: 8    Writer facilitated community check in group this morning. Writer opened by encouraging patients to fill out their check in sheets. Writer then provided patients with the GLADS activity, which asked them to identify something they were grateful for this week, something they learned, something they achieved, something that brought them delight, and something they did (or plan to do) for self care. Writer then facilitated sharing until the end of group.        Patient's Goal:  Participate in group therapy, complete check in sheet, identify gratitude and reflect on the past week.     Notes:  Patient engaged well in group this morning. She filled out her check in sheet, declining any SI or thoughts of self harm. Patient identified low levels of depression and anger and a higher level of anxiety. Writer observed patient participating in the activity and interacting well with peers, as evidenced by smiling and conversing. Patient did not get to share, but will continue with identified treatment goals in further groups.     Status After Intervention:  Improved    Participation Level: Active Listener and Interactive    Participation Quality: Appropriate and Attentive      Speech:  normal      Thought Process/Content: Logical      Affective Functioning: Congruent      Mood: euthymic      Level of consciousness:  Alert and Oriented x4      Response to Learning: Able to verbalize current knowledge/experience, Capable of insight, and Progressing to goal      Endings: None Reported    Modes of Intervention: Support, Socialization, and Exploration      Discipline Responsible: /Counselor      Signature:  MIESHA Perez

## 2024-04-12 NOTE — GROUP NOTE
Group Therapy Note    Date: 4/12/2024    Group Start Time: 12:00 PM  Group End Time:  1:00 PM  Group Topic: Psychoeducation    [facility]    Nayana Barrett MSW        Group Therapy Note  Writer opened with peer introductions for a new pt joining group. Writer encouraged rapport building conversation during introductions with an ice breaker question. Writer transitioned to completing education on cognitive distortions from a previous group. Writer prompted pts to anonymously identify their own cognitive distortions and supported the group in practicing challenging these distortions.     Attendees: 9       Patient's Goal:  building rapport with peers and learning to challenge cognitive distortions.    Notes:  Pt presented as attentive and engaged. Pt appropriately engaged in peer introductions, to include engaging appropriately and playfully in discussion with peers. Pt will continue to work on building rapport with peers and learning to challenge cognitive distortions.    Status After Intervention:  Unchanged    Participation Level: Active Listener and Interactive    Participation Quality: Appropriate, Attentive, and Sharing      Speech:  normal      Thought Process/Content: Logical      Affective Functioning: Congruent      Mood: euthymic      Level of consciousness:  Alert, Oriented x4, and Attentive      Response to Learning: Able to verbalize current knowledge/experience and Progressing to goal      Endings: None Reported    Modes of Intervention: Education and Socialization      Discipline Responsible: /Counselor      Signature:  MIESHA RAMÍREZ

## 2024-04-12 NOTE — BH NOTE
This writer directly informed Dr. Suggs of patient's elevated blood pressure. Patient denied any symptoms. Dr. Suggs consulted no higher level of care is needed, and recommended follow up with PCP.     - MIESHA Beal Student.

## 2024-04-12 NOTE — GROUP NOTE
Group Therapy Note    Date: 4/12/2024    Group Start Time:  1:10 PM  Group End Time:  2:00 PM  Group Topic: Psychoeducation    [facility]    Carina Garcia MSW        Group Therapy Note    Attendees: 8    Writer facilitated psychoeducation group centered around mindfulness and creative expression this afternoon. Writer opened by explaining the activity to patients and then showing them an instructional video on how to draw mandalas. Writer then provided patients supplies to draw and then decorate their mandalas, which they continued until the end of group.        Patient's Goal:  Participate in group therapy, deepen understanding of the relationship between mindfulness and creative expression, engage in creative expression.     Notes:  Patient engaged well in group this afternoon. She came slightly late to group, but engaged well in the activity and interacted appropriately with her peers throughout. Patient demonstrated good social skills and humor with her peers, as evidenced by smiling and laughing about the activity and making mistakes. Patient will continue with identified treatment goals in further groups.     Status After Intervention:  Improved    Participation Level: Active Listener and Interactive    Participation Quality: Appropriate, Attentive, and Sharing      Speech:  normal      Thought Process/Content: Logical      Affective Functioning: Congruent      Mood: euthymic      Level of consciousness:  Alert and Oriented x4      Response to Learning: Able to verbalize current knowledge/experience, Capable of insight, and Progressing to goal      Endings: None Reported    Modes of Intervention: Education, Exploration, and Activity      Discipline Responsible: /Counselor      Signature:  MIESHA Perez

## 2024-04-12 NOTE — GROUP NOTE
Group Therapy Note    Date: 4/12/2024    Group Start Time:  2:00 PM  Group End Time:  2:45 PM  Group Topic: Wrap-Up    [facility]    Anna Laguna        Group Therapy Note  This writer facilitated a wrap-up group by encouraging the patients to reflect on their day. Each patient was asked to complete the Afternoon Wrap Up Form. After allowing time for this, patients continued engaging in the mindfulness activity from the prior group. Patients were then asked to reflect on the activity. This writer then provided patients with their safety plan.    Attendees: 9       Patient's Goal:  To reflect on the day and participate in activity.    Notes:  The patient presented well to the session. This writer provided her with her wrap up form and safety plan. The patient denied any SI or harm to others. This writer then asked the patient to reflect on the mindfulness activity. She shared that she focused on using the color purple after drawing the mandala. The patient will continue to work on identified treatment goals in further groups.     Status After Intervention:  Unchanged    Participation Level: Active Listener and Interactive    Participation Quality: Appropriate, Attentive, and Sharing      Speech:  normal      Thought Process/Content: Logical  Linear      Affective Functioning: Congruent      Mood: euthymic      Level of consciousness:  Alert, Oriented x4, and Attentive      Response to Learning: Able to verbalize current knowledge/experience, Able to retain information, and Capable of insight      Endings: None Reported    Modes of Intervention: Support, Socialization, Exploration, and Activity      Discipline Responsible: /Counselor      Signature:  MIESHA Tan Student.

## 2024-04-15 ENCOUNTER — HOSPITAL ENCOUNTER (EMERGENCY)
Facility: HOSPITAL | Age: 24
Discharge: HOME | End: 2024-04-15
Attending: STUDENT IN AN ORGANIZED HEALTH CARE EDUCATION/TRAINING PROGRAM
Payer: MEDICAID

## 2024-04-15 ENCOUNTER — HOSPITAL ENCOUNTER (OUTPATIENT)
Facility: HOSPITAL | Age: 24
Setting detail: RECURRING SERIES
Discharge: HOME | End: 2024-04-18
Payer: MEDICAID

## 2024-04-15 VITALS
TEMPERATURE: 97.8 F | DIASTOLIC BLOOD PRESSURE: 96 MMHG | HEART RATE: 85 BPM | WEIGHT: 120.37 LBS | RESPIRATION RATE: 14 BRPM | OXYGEN SATURATION: 98 % | SYSTOLIC BLOOD PRESSURE: 124 MMHG | BODY MASS INDEX: 22.73 KG/M2 | HEIGHT: 61 IN

## 2024-04-15 VITALS
SYSTOLIC BLOOD PRESSURE: 119 MMHG | HEART RATE: 71 BPM | OXYGEN SATURATION: 100 % | TEMPERATURE: 98.3 F | DIASTOLIC BLOOD PRESSURE: 80 MMHG

## 2024-04-15 DIAGNOSIS — R51.9 ACUTE NONINTRACTABLE HEADACHE, UNSPECIFIED HEADACHE TYPE: ICD-10-CM

## 2024-04-15 DIAGNOSIS — S09.90XA TRAUMATIC INJURY OF HEAD, INITIAL ENCOUNTER: Primary | ICD-10-CM

## 2024-04-15 PROCEDURE — 99282 EMERGENCY DEPT VISIT SF MDM: CPT

## 2024-04-15 PROCEDURE — 90853 GROUP PSYCHOTHERAPY: CPT

## 2024-04-15 ASSESSMENT — PAIN DESCRIPTION - DESCRIPTORS: DESCRIPTORS: ACHING

## 2024-04-15 ASSESSMENT — PAIN DESCRIPTION - PAIN TYPE: TYPE: ACUTE PAIN

## 2024-04-15 ASSESSMENT — PAIN DESCRIPTION - FREQUENCY: FREQUENCY: CONTINUOUS

## 2024-04-15 ASSESSMENT — PAIN SCALES - GENERAL: PAINLEVEL_OUTOF10: 4

## 2024-04-15 ASSESSMENT — PAIN DESCRIPTION - LOCATION: LOCATION: HEAD

## 2024-04-15 ASSESSMENT — PAIN - FUNCTIONAL ASSESSMENT: PAIN_FUNCTIONAL_ASSESSMENT: 0-10

## 2024-04-15 NOTE — GROUP NOTE
Group Therapy Note    Date: 4/15/2024    Group Start Time:  1:10 PM  Group End Time:  2:00 PM  Group Topic: Psychoeducation    [facility] Anna Hernandez        Group Therapy Note  This writer facilitated a psychoeducational group on the inner and outer mask. This writer began by having an open discussion on what the difference is between the inner and outer mask. This writer then asked patients to decorate their outer and inner mask with pictures and words to represent how they appear to others and parts of themselves that they keep hidden. Patients then discussed the appearance and difference between their masks. This writer used open questions to prompt discussion.     Attendees: 6       Patient's Goal:  To learn and reflect on the inner and outer masks.    Notes:  The patient presented well to the session. The patient reflected on the differences between her outer and inner mask. The patient then shared how symptoms for her inner mask can show up at her job. The patient's peers provided support and feedback regarding anxiety symptoms. The patient also reflected on how things would like look if they only had their inner mask. The patient will continue to work on identified treatment goals in further groups.     Status After Intervention:  Unchanged    Participation Level: Active Listener and Interactive    Participation Quality: Appropriate, Attentive, Sharing, and Supportive      Speech:  normal      Thought Process/Content: Logical  Linear      Affective Functioning: Congruent      Mood: euthymic      Level of consciousness:  Alert, Oriented x4, and Attentive      Response to Learning: Able to verbalize current knowledge/experience, Able to verbalize/acknowledge new learning, Able to retain information, and Capable of insight      Endings: None Reported    Modes of Intervention: Education, Support, Socialization, Exploration, and

## 2024-04-15 NOTE — GROUP NOTE
Group Therapy Note    Date: 4/15/2024    Group Start Time:  9:00 AM  Group End Time:  9:40 AM  Group Topic: Community Meeting    [facility]    Nayana Barrett MSW        Group Therapy Note    Writer facilitated the morning check in community group focused on evaluating presentation, assessing for safety, and processing recent events. Writer facilitated a symptom and safety check in using the morning check in form.  Writer encouraged patients to share events, stressors, and progress. Writer prompted and supported peer feedback.     Attendees: 10       Patient's Goal:  disclosing safety concerns and engaging in processing with peers.      Notes:  Pt denied SI/TIRSO on the check in form. Pt checked yes for thoughts of harming others and was pulled following group. Pt presented as quiet and using coloring as a coping skill. Pt did not share. Pt will continue to work on disclosing safety concerns and engaging in processing with peers.      Status After Intervention:  Unchanged    Participation Level: Minimal    Participation Quality: Appropriate      Speech:  normal      Thought Process/Content: Logical      Affective Functioning: Congruent      Mood: euthymic      Level of consciousness:  Alert, Oriented x4, and Attentive      Response to Learning: Progressing to goal      Endings: None Reported    Modes of Intervention: Support and Socialization      Discipline Responsible: /Counselor      Signature:  MIESHA RAMÍREZ

## 2024-04-15 NOTE — GROUP NOTE
Group Therapy Note    Date: 4/15/2024    Group Start Time:  2:00 PM  Group End Time:  2:45 PM  Group Topic: Wrap-Up    [facility] Joselyn Beard        Group Therapy Note    In the wrap up group, this writer presented patients with their behavioral wrap-up sheet to indicate staff of any SI/HI. This writer then asked patients to write down and respond to the following prompt; If a genie granted you three wishes, what would you wish for? This writer has patients process this question for several minutes and then asked each patient their wishes. The writers asked patients to discuss how their wishes reflect their personal values. This writer supported patient processing by asking patients opened ended questions to build group rapport.     Attendees: 7       Patient's Goal:  Engage in discussion on wishes and personal values     Notes:  This patient was present and engaged in the wrap up group, indicating no feelings of SI/HI. Pt identified wishes as happiness and stability for friends and self. This patient will continue to name values and build group rapport in the future wrap-up groups.     Status After Intervention:  Unchanged    Participation Level: Active Listener and Interactive    Participation Quality: Appropriate, Attentive, and Sharing      Speech:  normal      Thought Process/Content: Logical      Affective Functioning: Congruent      Mood: euthymic      Level of consciousness:  Attentive      Response to Learning: Able to verbalize current knowledge/experience, Able to retain information, Capable of insight, Able to change behavior, and Progressing to goal      Endings: None Reported    Modes of Intervention: Support, Exploration, Activity, and Reality-testing      Discipline Responsible: /Counselor      Signature:  Joselyn Damian

## 2024-04-15 NOTE — BH NOTE
4/15/2024    Pt approached writer in office to ask if she could move her discharge forward from next Wednesday to this week. Writer prompted pt to ask why, pt stated that herself and another peer felt the same way: that there was nothing more they were going to get from the group. Patient stated it wasn't the therapists fault but declined to elaborate further. Writer educated patient that her set discharge date is our current recommendation and encouraged her to give it some time before making any decisions. Patient expressed feeling \"done\" and left writer's office.     MIESHA Perez

## 2024-04-15 NOTE — GROUP NOTE
Group Therapy Note    Date: 4/15/2024    Group Start Time:  9:40 AM  Group End Time: 10:30 AM  Group Topic: Process Group - Outpatient    [facility]    Anna Laguna        Group Therapy Note  This writer facilitated a process group by encouraging the patients to share about any recent thoughts, emotions, feelings, or situations, that they have experienced, in order to gain support and feedback from their peers. During this conversation the topics of addiction, validation, and emotions were discussed. This writer used open ended questions to encourage patients to reflect on their feelings.    Attendees: 9       Patient's Goal:  To process thoughts and feelings and provide support and feedback to peers.    Notes:  The patient presented well to the session. The patient was observed to listen attentively to her peers. The patient was observed to provide nonverbal support to her peers through head nods as they discussed topics of communication and validation. The patient will continue to work on processing emotions and thoughts in further groups.     Status After Intervention:  Unchanged    Participation Level: Active Listener    Participation Quality: Appropriate and Attentive      Speech:  normal      Thought Process/Content: Logical  Linear      Affective Functioning: Congruent      Mood: euthymic      Level of consciousness:  Alert, Oriented x4, and Attentive      Response to Learning: Capable of insight      Endings: None Reported    Modes of Intervention: Support, Socialization, and Exploration      Discipline Responsible: /Counselor      Signature:  MIESHA Tan Student.

## 2024-04-15 NOTE — BH NOTE
Pt approached writer asking to talk. Pt expressed frustration at being discouraged from discharging and verbalized feeling she cannot gain anything further from treatment. Writer provided validation of the valid as well as playful challenging of pt's statements as to why she could not benefit. Pt presented as receptive and agreed to try sharing more of her experiences to increase felt benefit as well as get to know her peers better, which had been identified as a barrier. Pt expressed gratitude and agreed to stay in the program.    MIESHA Daley

## 2024-04-16 ENCOUNTER — HOSPITAL ENCOUNTER (EMERGENCY)
Facility: HOSPITAL | Age: 24
Discharge: HOME | End: 2024-04-16
Payer: MEDICAID

## 2024-04-16 ENCOUNTER — APPOINTMENT (OUTPATIENT)
Facility: HOSPITAL | Age: 24
End: 2024-04-16
Payer: MEDICAID

## 2024-04-16 ENCOUNTER — HOSPITAL ENCOUNTER (OUTPATIENT)
Facility: HOSPITAL | Age: 24
Setting detail: RECURRING SERIES
Discharge: HOME | End: 2024-04-19
Payer: MEDICAID

## 2024-04-16 VITALS
WEIGHT: 118 LBS | BODY MASS INDEX: 22.28 KG/M2 | DIASTOLIC BLOOD PRESSURE: 74 MMHG | TEMPERATURE: 98.5 F | HEIGHT: 61 IN | OXYGEN SATURATION: 99 % | RESPIRATION RATE: 18 BRPM | HEART RATE: 107 BPM | SYSTOLIC BLOOD PRESSURE: 126 MMHG

## 2024-04-16 VITALS
TEMPERATURE: 98.3 F | OXYGEN SATURATION: 99 % | HEART RATE: 87 BPM | DIASTOLIC BLOOD PRESSURE: 94 MMHG | SYSTOLIC BLOOD PRESSURE: 139 MMHG

## 2024-04-16 DIAGNOSIS — S06.0X0D CONCUSSION WITHOUT LOSS OF CONSCIOUSNESS, SUBSEQUENT ENCOUNTER: Primary | ICD-10-CM

## 2024-04-16 PROCEDURE — 90853 GROUP PSYCHOTHERAPY: CPT

## 2024-04-16 PROCEDURE — 99284 EMERGENCY DEPT VISIT MOD MDM: CPT

## 2024-04-16 PROCEDURE — 90832 PSYTX W PT 30 MINUTES: CPT

## 2024-04-16 PROCEDURE — 6370000000 HC RX 637 (ALT 250 FOR IP)

## 2024-04-16 PROCEDURE — 70450 CT HEAD/BRAIN W/O DYE: CPT

## 2024-04-16 RX ORDER — ONDANSETRON 4 MG/1
4 TABLET, ORALLY DISINTEGRATING ORAL EVERY 8 HOURS PRN
Status: DISCONTINUED | OUTPATIENT
Start: 2024-04-16 | End: 2024-04-16 | Stop reason: HOSPADM

## 2024-04-16 RX ADMIN — ONDANSETRON 4 MG: 4 TABLET, ORALLY DISINTEGRATING ORAL at 12:58

## 2024-04-16 ASSESSMENT — ENCOUNTER SYMPTOMS
PHOTOPHOBIA: 0
GASTROINTESTINAL NEGATIVE: 1
EYES NEGATIVE: 1
RESPIRATORY NEGATIVE: 1
ALLERGIC/IMMUNOLOGIC NEGATIVE: 1

## 2024-04-16 ASSESSMENT — PAIN SCALES - GENERAL: PAINLEVEL_OUTOF10: 0

## 2024-04-16 ASSESSMENT — PAIN - FUNCTIONAL ASSESSMENT: PAIN_FUNCTIONAL_ASSESSMENT: 0-10

## 2024-04-16 ASSESSMENT — PAIN SCALES - WONG BAKER: WONGBAKER_NUMERICALRESPONSE: NO HURT
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
## 2024-04-16 NOTE — ED NOTES
Discharge instructions were given to the patient by STU Eric.     The patient left the Emergency Department alert and oriented and in no acute distress with 0 prescriptions. The patient was encouraged to call or return to the ED for worsening issues or problems and was encouraged to schedule a follow up appointment for continuing care.     Ambulation assessment completed before discharge.  Pt left Emergency Department ambulating at baseline with no ortho devices  Ortho device education: none    The patient verbalized understanding of discharge instructions and prescriptions, all questions were answered. The patient has no further concerns at this time.

## 2024-04-16 NOTE — BH NOTE
This tech retook pt vitals at 11:30am as requested by Dr. Suggs due to pt reporting of dizziness. Pt blood pressure came back abnormal at 139/94 and stated they would eat their lunch and report to staff if their symptoms sustained.
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
## 2024-04-16 NOTE — ED NOTES
Condition Stable  Patient discharged to home  Patient education was completed  Education taught to patient by Dr. Mendez  Teaching method used was handout and verbal  Understanding of teaching was good  Patient was discharged ambulatory with steady gait  Discharged with self  Valuables were given to patient remained in possession of belongings during stay
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
## 2024-04-16 NOTE — GROUP NOTE
Group Therapy Note    Date: 4/16/2024    Group Start Time: 10:50 AM  Group End Time: 11:30 AM  Group Topic: Cognitive Skills    [facility] [place]    Tala Tanner MSW        Group Therapy Note    Patients were asked to participate in a communication activity, in which one group member is describing an image verbally for the rest of the group to draw. Patients were encouraged to use communication skills and ask clarifying questions. Patients were asked to reflect on communication skills used and how to apply learning.    Attendees: 9       Patient's Goal:  Identify and practice effective communication skills, engage actively with peers     Notes:  The patient was not present for a majority of group time. The patient verbalized feeling dizzy during group and left for a large portion. The patient engaged in group activity when present, but was not able to fully complete any drawing. The patient engaged actively with peers. The patient will continue to practice engaging in groups and using communication skills.    Status After Intervention:  Unchanged    Participation Level: Interactive and Minimal    Participation Quality: Appropriate and Attentive      Speech:  normal      Thought Process/Content: Logical  Linear      Affective Functioning: Congruent      Mood: euthymic      Level of consciousness:  Alert, Oriented x4, and Attentive      Response to Learning: Progressing to goal      Endings: None Reported    Modes of Intervention: Education and Activity      Discipline Responsible: /Counselor      Signature:  MIESHA Garrison
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
## 2024-04-16 NOTE — BH NOTE
OUTPATIENT PHYSICIAN PROGRESS NOTE      Chief Complaint/Symptoms/Impairments (as noted in Treatment Plan): DIA, PTSD    Criteria for Continued Treatment (check all that apply):   [x] Preventing Decompensation   [x] Improving Level of Functioning  [x] Reducing Isolative Behaviors  [] Understanding Diagnosis and Need for Medications  [] Improving Treatment/Medication Compliance  [] Confronting Denial of Illness  [] Stabilizing Level of Functioning  [] Improving Emotional/Social/Cognitive Functioning  [] Decreasing Frequency of Hospitalizations    Suicidal/Homicidal ideations:   [x] Absent  [] Present  [] Passive  [] Intent  [] Plan  [] Death Wishes      CURRENT CONDITION OF PATIENT & PROGRESS ON TREATMENT PLAN    Subjective (ongoing complaints by patient regarding symptom severity, presentation, affect, function, etc.):  Kaylee Norris reports having a good weekend.  Anxiety is down however she still has trouble with memory.  Discussed concentration and registration of information in order to remember it.  Gets overwhelmed with groups and takes breaks to decompress. Managing better in the groups setting overall  No aggression or violence.  Appropriately interactive and aware. Tolerating medications well.  Appetite is fair.  Eating and sleeping fairly         Behavior (side effects, changes in mental status, objective observations seen in individual sessions or by staff): Kaylee Berrios is calm cooperative, clear and coherent with speech of average rate volume and tone.  Moods are fair.  Affect is fair range. Appropriately dressed and groomed.  Denies SI/HI/AH/VH.  No aggression or violence.  Appropriately interactive and aware.  Insight is good and judgement is fairly good.        Assessment/Plan (functional improvement and/or ongoing impairment, response to treatment, plan for future ongoing treatment and medication management to include revisions): Continue current care  Groups milieu and individual
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
## 2024-04-17 ENCOUNTER — HOSPITAL ENCOUNTER (OUTPATIENT)
Facility: HOSPITAL | Age: 24
Setting detail: RECURRING SERIES
Discharge: HOME | End: 2024-04-20
Payer: MEDICAID

## 2024-04-17 VITALS
DIASTOLIC BLOOD PRESSURE: 84 MMHG | HEART RATE: 84 BPM | SYSTOLIC BLOOD PRESSURE: 120 MMHG | OXYGEN SATURATION: 100 % | TEMPERATURE: 97.5 F

## 2024-04-17 PROCEDURE — 90853 GROUP PSYCHOTHERAPY: CPT

## 2024-04-17 NOTE — BH NOTE
10:15 Pt stormed out of group room, appearing visibly upset, hyperventilating and walking fast. Pt was saying out loud that she did not want to be at PHP anymore. Writer asked pt what was going on, pt stated she wanted to leave. Pt walked back in group room to get her things and stormed out of the PHP suite. Writer followed pt out and asked if pt felt safe. Pt stated she was safe and okay, and felt frustrated with staff and group members. Pt left before wrap up sheet could be completed.    Pt returned to PHP at 10:35 apologizing for leaving and stating she felt better. Pt identified that she should have told staff she was going to take a break in her car and return.    MIESHA Garrison

## 2024-04-17 NOTE — BH NOTE
OUTPATIENT PHYSICIAN PROGRESS NOTE      Chief Complaint/Symptoms/Impairments (as noted in Treatment Plan): DIA, PTSD    Criteria for Continued Treatment (check all that apply):   [x] Preventing Decompensation   [x] Improving Level of Functioning  [x] Reducing Isolative Behaviors  [] Understanding Diagnosis and Need for Medications  [] Improving Treatment/Medication Compliance  [] Confronting Denial of Illness  [] Stabilizing Level of Functioning  [] Improving Emotional/Social/Cognitive Functioning  [] Decreasing Frequency of Hospitalizations    Suicidal/Homicidal ideations:   [x] Absent  [] Present  [] Passive  [] Intent  [] Plan  [] Death Wishes      CURRENT CONDITION OF PATIENT & PROGRESS ON TREATMENT PLAN    Subjective (ongoing complaints by patient regarding symptom severity, presentation, affect, function, etc.):  Kaylee Norris reports having trouble with shortness of breath lately and it has been getting worse.  Her concern is that her anxiety is back to the way it was when she began the program.  Medications have fleeting effects.  She went to the emergency department a few times in the past week with no physical condition found.  She is using inhalers for her breathing and Xanax effect is short lived.  She notes being extra tired and over sleeping. Anxiety is her thought.  Notes drinking coffee in the morning and getting tired in the afternoon almost driving off the road due to being tired.  Does not drink soda and is sensitive to medications in general.  Discussed drinking low caffeinated drinks in the afternoon like Vitamin water or tea as many medications are too potent for her.  Managing better in the groups setting overall  No aggression or violence.  Appropriately interactive and aware. Tolerating medications well.  Appetite is fair.  Eating and sleeping fairly         Behavior (side effects, changes in mental status, objective observations seen in individual sessions or by staff): Kaylee

## 2024-04-17 NOTE — GROUP NOTE
Group Therapy Note    Date: 4/17/2024    Group Start Time:  1:10 PM  Group End Time:  2:00 PM  Group Topic: Psychoeducation    [facility]    Nayana Barrett MSW        Group Therapy Note    Writer facilitated psychoeducation group. Writer provided handouts on the fight/flight/freeze/alida response. Writer provided education on the purpose of the F/F/F/F response and the different forms it takes. Writer transitioned to supporting the  who came to lead the second half of group. The PSS facilitated an identity activity and encouraged pts to share.     Attendees: 5       Patient's Goal:  understanding trauma responses and engaging with the PSS.    Notes:  Pt presented as attentive as evidenced by eye contact. Pt presented as quiet during education and was observed to doodle. Pt engaged appropriately with the PSS. Pt will continue to work on understanding trauma responses and engaging with the PSS.    Status After Intervention:  Unchanged    Participation Level: Active Listener and Minimal    Participation Quality: Appropriate and Attentive      Speech:  normal      Thought Process/Content: Logical      Affective Functioning: Congruent      Mood: anxious      Level of consciousness:  Alert, Oriented x4, and Attentive      Response to Learning: Able to verbalize current knowledge/experience and Progressing to goal      Endings: None Reported    Modes of Intervention: Education      Discipline Responsible: /Counselor      Signature:  MIESHA RAMÍREZ    
                                                                      Group Therapy Note    Date: 4/17/2024    Group Start Time:  9:00 AM  Group End Time:  9:40 AM  Group Topic: Community Meeting    [facility]    Carina Garcia MSW        Group Therapy Note    Attendees: 9    Writer facilitated community check in group this morning. Writer opened by encouraging patients to fill out their check in sheets. Writer then checked in with patients and asked them to share how they are doing today. This sharing lasted until the end of group.        Patient's Goal:  Participate in group therapy, fill out check in sheet, engage in open sharing and rapport building with peers.    Notes:  Patient engaged fairly in group this morning. She filled out her check in sheet, declining any thoughts of SI or self harm. Patient identified middling depression and anger and high anxiety this morning. She shared about being a natural early riser and provided good verbal support to a sharing peer. Patient stepped out of group toward the end and did not return. She will continue with identified treatment goals in further groups.     Status After Intervention:  Unchanged    Participation Level: Active Listener and Interactive    Participation Quality: Appropriate, Attentive, and Sharing      Speech:  normal      Thought Process/Content: Logical      Affective Functioning: Congruent      Mood: euthymic      Level of consciousness:  Alert and Oriented x4      Response to Learning: Able to verbalize current knowledge/experience, Capable of insight, and Progressing to goal      Endings: None Reported    Modes of Intervention: Support, Socialization, and Exploration      Discipline Responsible: /Counselor      Signature:  MIESHA Perez    
                                                                      Group Therapy Note    Date: 4/17/2024    Group Start Time:  9:40 AM  Group End Time: 10:30 AM  Group Topic: Process Group - Outpatient    [facility]- Adolescent    Joselyn Damian        Group Therapy Note    This writer facilitated the process group, presenting the patients with a cathartic brain-dump activity. This writer asked patients to respond to several prompts based on current thoughts, feelings and experiences with no filter or formality to increase processing. This writer invited patients to discuss their experiences with their brain dump in a group discussion by asking open-ended questions and supported pt processing using concepts derived from CBT.     Attendees: 5       Patient's Goal:  Engage in cathartic brain dump exercise      Notes:  Pt was present in the process group showing minimal engagement in group discussion. Pt shared frustrations due to changes in therapist dynamic and current therapist vernacular. Pt abruptly left after stating \"fuck this shit\", and grabbing belongings.     Status After Intervention:  Unchanged    Participation Level: Minimal    Participation Quality: Intrusive and Resistant      Speech:  pressured      Thought Process/Content: Flight of ideas      Affective Functioning: Blunted      Mood: angry and irritable      Level of consciousness:  Inattentive      Response to Learning: Resistant      Endings: None Reported    Modes of Intervention: Support and Activity      Discipline Responsible: /Counselor      Signature:  Joselyn Damian    
                                                                      Group Therapy Note    Date: 4/17/2024    Group Start Time: 10:40 AM  Group End Time: 11:30 AM  Group Topic: Cognitive Skills    [facility]    Tala Tanner MSW        Group Therapy Note    Patients were given educational materials about inner critic vs. inner . Patients were encouraged to identify how they engage in inner critic and inner  styles of self-talk. Patients were asked to identify sources of positive and negative self-talk. Patients were encouraged to share and provide feedback to peers.    Attendees: 4       Patient's Goal:  Identify examples of inner criticism and inner coaching, identify sources of inner criticism, identify ways to challenge thinking     Notes:  The patient was not present for cognitive skills group and did not engage.    Status After Intervention:  N/A    Participation Level: N/A    Participation Quality: N/A      Speech:  N/A      Thought Process/Content: N/A      Affective Functioning: N/A      Mood: N/A      Level of consciousness:  N/A      Response to Learning: N/A      Endings: N/A    Modes of Intervention: N/A      Discipline Responsible: /Counselor      Signature:  MIESHA Garrison    
                                                                      Group Therapy Note    Date: 4/17/2024    Group Start Time: 12:00 PM  Group End Time:  1:00 PM  Group Topic: Psychotherapy    [facility] Joselyn Beard        Group Therapy Note    In the treatment planning group, this writer reviewed the prior group discussion on inner  and inner critic. This writer discussed ways to combat inner critic such as the use of encouragement, self care and positive affirmations. This writer practiced challenging negative inner critic by having each patient write their favorite affirmation of their choice on a sticky note and give it to a group members. This writer encouraged patients to share feelings and thoughts after receiving their affirmation. This writer supported pt processing by asking open-ended questions and concepts derived from CBT.     Attendees: 5         Patient's Goal:  Challenge inner critic through sharing positive affirmations    Notes:  This pt was present and engaged in the treatment planning group, sharing difficulties with taking compliments but stated she enjoyed her received affirmation. The pt will continue to challenge negative inner critic through positive self talk in the future treatment groups.     Status After Intervention:  Unchanged    Participation Level: Active Listener and Interactive    Participation Quality: Appropriate, Attentive, and Sharing      Speech:  normal      Thought Process/Content: Logical      Affective Functioning: Congruent      Mood: euthymic      Level of consciousness:  Attentive      Response to Learning: Able to verbalize current knowledge/experience, Able to retain information, Capable of insight, and Progressing to goal      Endings: None Reported    Modes of Intervention: Support, Clarifying, and Activity      Discipline Responsible: /Counselor      Signature:  Joselyn Damian    
Responsible: /Counselor      Signature:  Joselyn Damian
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
## 2024-04-17 NOTE — BH NOTE
This tech retook pt vitals at 11:15am. Pt no longer had abnormal blood pressure.     This tech took pt vitals at start of treatment day. Pt blood pressure read abnormal. This tech informed Dr. Nieto that this tech would retake pt vitals in one hour and follow up as needed.

## 2024-04-18 ENCOUNTER — HOSPITAL ENCOUNTER (OUTPATIENT)
Facility: HOSPITAL | Age: 24
Setting detail: RECURRING SERIES
Discharge: HOME | End: 2024-04-21
Payer: MEDICAID

## 2024-04-18 VITALS
TEMPERATURE: 98.1 F | HEART RATE: 113 BPM | OXYGEN SATURATION: 100 % | SYSTOLIC BLOOD PRESSURE: 137 MMHG | DIASTOLIC BLOOD PRESSURE: 96 MMHG

## 2024-04-18 DIAGNOSIS — F43.10 PTSD (POST-TRAUMATIC STRESS DISORDER): ICD-10-CM
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
## 2024-04-18 NOTE — BH NOTE
Discharge Progress Note  Patient was admitted 3/13 with increased anxiety symptoms, including panic attacks and shortness of breath. She was able to set goals of learning and maintaining anxiety management skills as well as learning and demonstrating improved self management. Patient made significant progress on these goals, as evidenced by increased ability to regulate during panic attacks, demonstrating coping skills, and increased processing in group. Patient was administratively discharged after engaging with a peer outside of group, but is welcome to return to treatment in PHP in future when she feels ready to receive treatment more readily.     Writer called patient this afternoon to follow up and inform her of the appointments that had already been made for her discharge follow ups. Writer left a message for patient with the details of those appointments, along with a callback number and the intake line, for when patient is ready to restart treatment.     Her follow up appointments are as follows:   Appointment #1        Therapy  May 1, 2024 at 5pm   Matt Bower @ [facility]  [phone]    Appointment #2           Psychiatry   May 1, 2024 at 5:30pm  Lissette Muhammad NP @ [facility]     MIESHA Perez

## 2024-04-19 ENCOUNTER — APPOINTMENT (OUTPATIENT)
Facility: HOSPITAL | Age: 24
End: 2024-04-19
Payer: MEDICAID

## 2024-04-22 ENCOUNTER — OFFICE VISIT (OUTPATIENT)
Age: 24
End: 2024-04-22

## 2024-04-22 ENCOUNTER — APPOINTMENT (OUTPATIENT)
Facility: HOSPITAL | Age: 24
End: 2024-04-22
Payer: MEDICAID

## 2024-04-22 ENCOUNTER — TELEPHONE (OUTPATIENT)
Facility: HOSPITAL | Age: 24
End: 2024-04-22

## 2024-04-22 VITALS — SYSTOLIC BLOOD PRESSURE: 129 MMHG | DIASTOLIC BLOOD PRESSURE: 78 MMHG | TEMPERATURE: 98 F | HEART RATE: 105 BPM

## 2024-04-22 DIAGNOSIS — Z53.21 PATIENT LEFT AFTER TRIAGE: Primary | ICD-10-CM

## 2024-04-22 PROBLEM — F41.9 ANXIETY DISORDER: Status: ACTIVE | Noted: 2024-04-22

## 2024-04-22 RX ORDER — BUSPIRONE HYDROCHLORIDE 5 MG/1
15 TABLET ORAL 2 TIMES DAILY
Qty: 180 TABLET | Refills: 0 | Status: SHIPPED | OUTPATIENT
Start: 2024-04-22 | End: 2024-05-22

## 2024-04-22 RX ORDER — HYDROXYZINE HYDROCHLORIDE 10 MG/1
25 TABLET, FILM COATED ORAL 3 TIMES DAILY PRN
Qty: 30 TABLET | Refills: 0 | Status: SHIPPED | OUTPATIENT
Start: 2024-04-22

## 2024-04-22 RX ORDER — LORAZEPAM 0.5 MG/1
0.5 TABLET ORAL DAILY PRN
Qty: 15 TABLET | Refills: 0 | Status: SHIPPED | OUTPATIENT
Start: 2024-04-22 | End: 2024-05-13

## 2024-04-22 NOTE — TELEPHONE ENCOUNTER
4/22/2024  8:52 AM     Writer called pt to follow up after pt called intake line to report being almost out of medications. Writer asked physician Dr. Nieto about refills, and he stated he would put them in today. Writer called patient to inform her of this, and she voiced understanding.

## 2024-04-22 NOTE — PROGRESS NOTES
TRIAGE NOTE TO THE EMERGENCY DEPARTMENT    CHIEF COMPLAINT    Chief Complaint   Patient presents with    Shortness of Breath     Concerned about re-occuring shortness of breath.          MEDICAL DECISION MAKING    Patient presented with   Chief Complaint   Patient presents with    Shortness of Breath     Concerned about re-occuring shortness of breath.     .  Discussed potential concerns for: Shortness of breath.   Recommend patient to seek care at the nearest emergency department for further evaluation.      Transportation:  private car patient declined EMS    TEST / PROCEDURES COMPLETED AT URGENT CARE:  1.No results found for this visit on 04/22/24.  2.  Patient declined EKG or any further evaluation    Assessment & Plan       Visit Diagnoses and Associated Orders       Patient left after triage    -  Primary                  Subjective   HPI    Kaylee Berrios is a 23 y.o. female who presents symptoms of shortness of breath.  Reports he has had workup for shortness of breath in the past including x-ray and EKG.  She declines EKG.  She declines x-ray.  She declines EMS transfer to the ER.      CURRENT MEDICATIONS    Current Outpatient Rx   Medication Sig Dispense Refill    busPIRone (BUSPAR) 5 MG tablet Take 3 tablets by mouth 2 times daily 180 tablet 0    hydrOXYzine HCl (ATARAX) 10 MG tablet Take 1 tablet by mouth nightly 30 tablet 0    albuterol sulfate HFA (VENTOLIN HFA) 108 (90 Base) MCG/ACT inhaler Inhale 2 puffs into the lungs 4 times daily as needed for Wheezing 18 g 0    ondansetron (ZOFRAN-ODT) 4 MG disintegrating tablet Take 1 tablet by mouth 3 times daily as needed for Nausea or Vomiting (Patient not taking: Reported on 3/7/2024) 21 tablet 0    albuterol sulfate HFA (PROVENTIL;VENTOLIN;PROAIR) 108 (90 Base) MCG/ACT inhaler Inhale 2 puffs into the lungs every 4 hours as needed      naproxen (NAPROSYN) 500 MG tablet Take 1 tablet by mouth (Patient not taking: Reported on 3/7/2024)         ALLERGIES

## 2024-04-22 NOTE — PATIENT INSTRUCTIONS
GO TO ER FOR FURTHER EVALUATION & MANAGEMENT.     Discussed potential concerns for: shortness of breath.   Recommend patient to seek care at the nearest emergency department for further evaluation.    Transportation:  private car

## 2024-04-22 NOTE — BH NOTE
4/22/2024  8:52 AM    Writer called pt to follow up after pt called intake line to report being almost out of medications. Writer asked physician Dr. Nieto about refills, and he stated he would put them in today. Writer called patient to inform her of this, and she voiced understanding.     MIESHA Perez

## 2024-04-23 ENCOUNTER — APPOINTMENT (OUTPATIENT)
Facility: HOSPITAL | Age: 24
End: 2024-04-23
Payer: MEDICAID

## 2024-04-24 ENCOUNTER — APPOINTMENT (OUTPATIENT)
Facility: HOSPITAL | Age: 24
End: 2024-04-24
Payer: MEDICAID

## 2024-04-25 ENCOUNTER — APPOINTMENT (OUTPATIENT)
Facility: HOSPITAL | Age: 24
End: 2024-04-25
Payer: MEDICAID

## 2024-04-26 ENCOUNTER — APPOINTMENT (OUTPATIENT)
Facility: HOSPITAL | Age: 24
End: 2024-04-26
Payer: MEDICAID

## 2024-04-29 ENCOUNTER — APPOINTMENT (OUTPATIENT)
Facility: HOSPITAL | Age: 24
End: 2024-04-29
Payer: MEDICAID

## 2024-04-30 ENCOUNTER — APPOINTMENT (OUTPATIENT)
Facility: HOSPITAL | Age: 24
End: 2024-04-30
Payer: MEDICAID

## 2024-05-04 ENCOUNTER — APPOINTMENT (OUTPATIENT)
Facility: HOSPITAL | Age: 24
End: 2024-05-04
Payer: MEDICAID

## 2024-05-04 ENCOUNTER — HOSPITAL ENCOUNTER (EMERGENCY)
Facility: HOSPITAL | Age: 24
Discharge: HOME | End: 2024-05-04
Attending: EMERGENCY MEDICINE
Payer: MEDICAID

## 2024-05-04 VITALS
RESPIRATION RATE: 16 BRPM | BODY MASS INDEX: 22.3 KG/M2 | TEMPERATURE: 98.9 F | OXYGEN SATURATION: 99 % | SYSTOLIC BLOOD PRESSURE: 115 MMHG | HEIGHT: 61 IN | DIASTOLIC BLOOD PRESSURE: 71 MMHG | HEART RATE: 90 BPM

## 2024-05-04 DIAGNOSIS — R06.02 SHORTNESS OF BREATH: Primary | ICD-10-CM

## 2024-05-04 DIAGNOSIS — Z72.0 TOBACCO USE: ICD-10-CM

## 2024-05-04 LAB
ALBUMIN SERPL-MCNC: 4.4 G/DL (ref 3.5–5)
ALBUMIN/GLOB SERPL: 1.3 (ref 1.1–2.2)
ALP SERPL-CCNC: 42 U/L (ref 45–117)
ALT SERPL-CCNC: 21 U/L (ref 12–78)
ANION GAP SERPL CALC-SCNC: 11 MMOL/L (ref 5–15)
AST SERPL-CCNC: 12 U/L (ref 15–37)
BASOPHILS # BLD: 0.1 K/UL (ref 0–0.1)
BASOPHILS NFR BLD: 1 % (ref 0–1)
BILIRUB SERPL-MCNC: 0.6 MG/DL (ref 0.2–1)
BUN SERPL-MCNC: 9 MG/DL (ref 6–20)
BUN/CREAT SERPL: 13 (ref 12–20)
CALCIUM SERPL-MCNC: 8.7 MG/DL (ref 8.5–10.1)
CHLORIDE SERPL-SCNC: 105 MMOL/L (ref 97–108)
CO2 SERPL-SCNC: 26 MMOL/L (ref 21–32)
CREAT SERPL-MCNC: 0.71 MG/DL (ref 0.55–1.02)
DIFFERENTIAL METHOD BLD: NORMAL
EOSINOPHIL # BLD: 0.1 K/UL (ref 0–0.4)
EOSINOPHIL NFR BLD: 1 % (ref 0–7)
ERYTHROCYTE [DISTWIDTH] IN BLOOD BY AUTOMATED COUNT: 12.2 % (ref 11.5–14.5)
GLOBULIN SER CALC-MCNC: 3.5 G/DL (ref 2–4)
GLUCOSE SERPL-MCNC: 61 MG/DL (ref 65–100)
HCT VFR BLD AUTO: 41.1 % (ref 35–47)
HGB BLD-MCNC: 13.4 G/DL (ref 11.5–16)
IMM GRANULOCYTES # BLD AUTO: 0 K/UL (ref 0–0.04)
IMM GRANULOCYTES NFR BLD AUTO: 0 % (ref 0–0.5)
LYMPHOCYTES # BLD: 1.2 K/UL (ref 0.8–3.5)
LYMPHOCYTES NFR BLD: 19 % (ref 12–49)
MCH RBC QN AUTO: 29.8 PG (ref 26–34)
MCHC RBC AUTO-ENTMCNC: 32.6 G/DL (ref 30–36.5)
MCV RBC AUTO: 91.3 FL (ref 80–99)
MONOCYTES # BLD: 0.4 K/UL (ref 0–1)
MONOCYTES NFR BLD: 6 % (ref 5–13)
NEUTS SEG # BLD: 4.7 K/UL (ref 1.8–8)
NEUTS SEG NFR BLD: 73 % (ref 32–75)
NRBC # BLD: 0 K/UL (ref 0–0.01)
NRBC BLD-RTO: 0 PER 100 WBC
PLATELET # BLD AUTO: 303 K/UL (ref 150–400)
PMV BLD AUTO: 11.8 FL (ref 8.9–12.9)
POTASSIUM SERPL-SCNC: 3.9 MMOL/L (ref 3.5–5.1)
PROT SERPL-MCNC: 7.9 G/DL (ref 6.4–8.2)
RBC # BLD AUTO: 4.5 M/UL (ref 3.8–5.2)
SODIUM SERPL-SCNC: 142 MMOL/L (ref 136–145)
WBC # BLD AUTO: 6.4 K/UL (ref 3.6–11)

## 2024-05-04 PROCEDURE — 36415 COLL VENOUS BLD VENIPUNCTURE: CPT

## 2024-05-04 PROCEDURE — 71046 X-RAY EXAM CHEST 2 VIEWS: CPT

## 2024-05-04 PROCEDURE — 93005 ELECTROCARDIOGRAM TRACING: CPT | Performed by: HOSPITALIST

## 2024-05-04 PROCEDURE — 99284 EMERGENCY DEPT VISIT MOD MDM: CPT

## 2024-05-04 PROCEDURE — 85025 COMPLETE CBC W/AUTO DIFF WBC: CPT

## 2024-05-04 PROCEDURE — 80053 COMPREHEN METABOLIC PANEL: CPT

## 2024-05-04 ASSESSMENT — PAIN SCALES - GENERAL: PAINLEVEL_OUTOF10: 0

## 2024-05-04 ASSESSMENT — LIFESTYLE VARIABLES
HOW MANY STANDARD DRINKS CONTAINING ALCOHOL DO YOU HAVE ON A TYPICAL DAY: PATIENT DOES NOT DRINK
HOW OFTEN DO YOU HAVE A DRINK CONTAINING ALCOHOL: NEVER

## 2024-05-04 NOTE — ED PROVIDER NOTES
[facility]  EMERGENCY DEPARTMENT ENCOUNTER      Pt Name: Kaylee Berrios  MRN: [id number]  Birthdate 2000  Date of evaluation: 5/4/2024  Provider: Srinivas Stewart DO    CHIEF COMPLAINT       Chief Complaint   Patient presents with    Chest Pain    Shortness of Breath         HISTORY OF PRESENT ILLNESS   (Location/Symptom, Timing/Onset, Context/Setting, Quality, Duration, Modifying Factors, Severity)  Note limiting factors.   23-year-old female comes in for shortness of breath.  She states that for \"months\" she has had shortness of breath.  She states it is all day every day.  She states that nothing she has done has changed this.  She denies chest pain fevers or chills.  She endorses somewhat heavy menstrual cycles.  She denies chance of pregnancy.  She endorses tobacco use but denies drug or alcohol use.  She denies other complaints.  She states that she seen several doctors for this in the past.  She is curious whether this could be related to her \"really bad Raynaud's\" or possibly related to some other undiagnosed diagnosis like lupus or the like            Review of External Medical Records:     Nursing Notes were reviewed.    REVIEW OF SYSTEMS    (2-9 systems for level 4, 10 or more for level 5)     Review of Systems    Except as noted above the remainder of the review of systems was reviewed and negative.       PAST MEDICAL HISTORY     Past Medical History:   Diagnosis Date    Concussion     Constipation     Dental disorder     Irritable bowel syndrome     Murmur     Psychiatric disorder     Anxiety    Psychiatric disorder     PTSD    Suicide attempt (HCC)     Vision decreased          SURGICAL HISTORY     History reviewed. No pertinent surgical history.      CURRENT MEDICATIONS       Discharge Medication List as of 5/4/2024 11:44 AM        CONTINUE these medications which have NOT CHANGED    Details   LORazepam (ATIVAN) 0.5 MG tablet Take 1 tablet by mouth daily as needed for Anxiety for

## 2024-05-04 NOTE — ED TRIAGE NOTES
Patient arrives with c/o chest pain, \"lung pain\", sob and dizziness \"all the time\" for months. Patient reports she's had this checked and was told it was due to anxiety, but patient does not believe it to be anxiety.

## 2024-05-05 ENCOUNTER — HOSPITAL ENCOUNTER (EMERGENCY)
Facility: HOSPITAL | Age: 24
Discharge: HOME | End: 2024-05-05
Attending: EMERGENCY MEDICINE
Payer: MEDICAID

## 2024-05-05 VITALS
OXYGEN SATURATION: 100 % | HEART RATE: 87 BPM | SYSTOLIC BLOOD PRESSURE: 122 MMHG | RESPIRATION RATE: 18 BRPM | DIASTOLIC BLOOD PRESSURE: 86 MMHG | TEMPERATURE: 98.1 F

## 2024-05-05 DIAGNOSIS — F43.0 ANXIETY AS ACUTE REACTION TO GROSS STRESS: Primary | ICD-10-CM

## 2024-05-05 DIAGNOSIS — F41.1 ANXIETY AS ACUTE REACTION TO GROSS STRESS: Primary | ICD-10-CM

## 2024-05-05 PROCEDURE — 99283 EMERGENCY DEPT VISIT LOW MDM: CPT

## 2024-05-05 RX ORDER — BUSPIRONE HYDROCHLORIDE 5 MG/1
20 TABLET ORAL 2 TIMES DAILY
Qty: 20 TABLET | Refills: 0
Start: 2024-05-05 | End: 2024-05-07 | Stop reason: HOSPADM
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
## 2024-05-05 NOTE — ED TRIAGE NOTES
Patient arrives with cc of shortness of breath. Reports she was seen here yesterday, reports they did many tests but findings were negative. Reports last using inhaler less than a hour ago. Reports she has had L anterior rib pain since waking up. Reports she has not smoked since 3 pm yesterday, and thought her shortness of breath would have decreased by now. Reports she has a smoker's cough. Has pulmonologist following her.  Arrives ambulatory,  A & Ox 4, and pov. VSS. Patient is tearful at this time. Expressing she cannot breath. O2 100 on RA.

## 2024-05-06 LAB
EKG ATRIAL RATE: 91 BPM
EKG DIAGNOSIS: NORMAL
EKG P AXIS: 73 DEGREES
EKG P-R INTERVAL: 136 MS
EKG Q-T INTERVAL: 350 MS
EKG QRS DURATION: 74 MS
EKG QTC CALCULATION (BAZETT): 430 MS
EKG R AXIS: 61 DEGREES
EKG T AXIS: 74 DEGREES
EKG VENTRICULAR RATE: 91 BPM

## 2024-05-07 ENCOUNTER — HOSPITAL ENCOUNTER (OUTPATIENT)
Facility: HOSPITAL | Age: 24
Setting detail: RECURRING SERIES
Discharge: HOME | End: 2024-05-10
Payer: MEDICAID

## 2024-05-07 VITALS
TEMPERATURE: 98.3 F | DIASTOLIC BLOOD PRESSURE: 81 MMHG | HEART RATE: 85 BPM | OXYGEN SATURATION: 99 % | SYSTOLIC BLOOD PRESSURE: 122 MMHG

## 2024-05-07 PROCEDURE — 90853 GROUP PSYCHOTHERAPY: CPT

## 2024-05-07 RX ORDER — FLUOXETINE 20 MG/1
10 TABLET, FILM COATED ORAL DAILY
Qty: 15 TABLET | Refills: 0 | Status: SHIPPED | OUTPATIENT
Start: 2024-05-07

## 2024-05-07 RX ORDER — PROPRANOLOL HYDROCHLORIDE 10 MG/1
10 TABLET ORAL 2 TIMES DAILY PRN
Qty: 90 TABLET | Refills: 0 | Status: SHIPPED | OUTPATIENT
Start: 2024-05-07

## 2024-05-07 ASSESSMENT — PATIENT HEALTH QUESTIONNAIRE - PHQ9
SUM OF ALL RESPONSES TO PHQ QUESTIONS 1-9: 11
9. THOUGHTS THAT YOU WOULD BE BETTER OFF DEAD, OR OF HURTING YOURSELF: NOT AT ALL
SUM OF ALL RESPONSES TO PHQ9 QUESTIONS 1 & 2: 3
1. LITTLE INTEREST OR PLEASURE IN DOING THINGS: SEVERAL DAYS
SUM OF ALL RESPONSES TO PHQ QUESTIONS 1-9: 11
SUM OF ALL RESPONSES TO PHQ QUESTIONS 1-9: 11
5. POOR APPETITE OR OVEREATING: MORE THAN HALF THE DAYS
10. IF YOU CHECKED OFF ANY PROBLEMS, HOW DIFFICULT HAVE THESE PROBLEMS MADE IT FOR YOU TO DO YOUR WORK, TAKE CARE OF THINGS AT HOME, OR GET ALONG WITH OTHER PEOPLE: VERY DIFFICULT
6. FEELING BAD ABOUT YOURSELF - OR THAT YOU ARE A FAILURE OR HAVE LET YOURSELF OR YOUR FAMILY DOWN: NEARLY EVERY DAY
SUM OF ALL RESPONSES TO PHQ QUESTIONS 1-9: 11
3. TROUBLE FALLING OR STAYING ASLEEP: SEVERAL DAYS
7. TROUBLE CONCENTRATING ON THINGS, SUCH AS READING THE NEWSPAPER OR WATCHING TELEVISION: SEVERAL DAYS
2. FEELING DOWN, DEPRESSED OR HOPELESS: MORE THAN HALF THE DAYS
4. FEELING TIRED OR HAVING LITTLE ENERGY: SEVERAL DAYS
8. MOVING OR SPEAKING SO SLOWLY THAT OTHER PEOPLE COULD HAVE NOTICED. OR THE OPPOSITE, BEING SO FIGETY OR RESTLESS THAT YOU HAVE BEEN MOVING AROUND A LOT MORE THAN USUAL: NOT AT ALL

## 2024-05-07 ASSESSMENT — ANXIETY QUESTIONNAIRES
4. TROUBLE RELAXING: MORE THAN HALF THE DAYS
1. FEELING NERVOUS, ANXIOUS, OR ON EDGE: NEARLY EVERY DAY
IF YOU CHECKED OFF ANY PROBLEMS ON THIS QUESTIONNAIRE, HOW DIFFICULT HAVE THESE PROBLEMS MADE IT FOR YOU TO DO YOUR WORK, TAKE CARE OF THINGS AT HOME, OR GET ALONG WITH OTHER PEOPLE: EXTREMELY DIFFICULT
GAD7 TOTAL SCORE: 18
6. BECOMING EASILY ANNOYED OR IRRITABLE: MORE THAN HALF THE DAYS
7. FEELING AFRAID AS IF SOMETHING AWFUL MIGHT HAPPEN: NEARLY EVERY DAY
2. NOT BEING ABLE TO STOP OR CONTROL WORRYING: NEARLY EVERY DAY
5. BEING SO RESTLESS THAT IT IS HARD TO SIT STILL: MORE THAN HALF THE DAYS

## 2024-05-07 NOTE — GROUP NOTE
Group Therapy Note    Date: 5/7/2024    Group Start Time: 12:00 PM  Group End Time:  1:00 PM  Group Topic: Relapse Prevention    [facility]    Sal Capellan LCSW        Group Therapy Note    This writer facilitated the relapse prevention group. The patients were encouraged to participate in an ice breaker/ getting to know you activity to introduce selves to this writer and each other.  The patients were informed on inner critic and encouraged to discuss self-criticism and how they can challenge their inner critic.     Attendees: 5       Patient's Goal:  Self-disclose and discuss inner critic       Notes: The patient participated in ice breaker activity and shared details about why she is in treatment and goals for treatment. The patient shared that she enjoys the outdoors and that she is hoping to continue to work on addressing the physical symptom of her anxiety. The patient identified shortness of breath as a main symptom. The patient participated in the discussion of the inner critic. The patient shared details of inner critic and how she challenges the thoughts her inner critic creates.  The patient will continue to self-disclose and discuss the inner critic during the relapse prevention group.     Status After Intervention:  Unchanged    Participation Level: Active Listener and Interactive    Participation Quality: Appropriate, Attentive, Sharing, and Supportive      Speech:  normal      Thought Process/Content: Logical      Affective Functioning: Congruent      Mood: euthymic      Level of consciousness:  Alert, Oriented x4, and Attentive      Response to Learning: Able to verbalize current knowledge/experience      Endings: None Reported    Modes of Intervention: Support, Socialization, and Activity      Discipline Responsible: /Counselor      Signature:  Sal Capellan LCSW

## 2024-05-07 NOTE — GROUP NOTE
Group Therapy Note    Date: 5/7/2024    Group Start Time:  1:10 PM  Group End Time:  2:00 PM  Group Topic: Psychoeducation    [facility]    Nayana Barrett MSW        Group Therapy Note    Writer facilitated psychoeducation group on anxiety management skills. Writer opened with brief education on the benefits of walking and mindfulness, then facilitated a mindfulness walk. Writer encouraged processing afterwards. Writer transitioned to education on and leading the shake it out exercise. Writer closed with education on tapping.     Attendees: 4       Patient's Goal:  learning and practicing skills for anxiety management.    Notes:  Pt presented as attentive and engaged as evidenced by appropriate eye contact and spontaneous participation. Pt reported anxiety at start of group. Pt reported the walk made her more anxious due to sensory overwhelm. Pt reported the shake it out exercise helped. Pt discussed her progress in managing anxiety with peers. Pt will continue to work on learning and practicing skills for anxiety management.    Status After Intervention:  Unchanged    Participation Level: Active Listener and Interactive    Participation Quality: Appropriate, Attentive, and Sharing      Speech:  normal      Thought Process/Content: Logical      Affective Functioning: Congruent      Mood: euthymic      Level of consciousness:  Alert, Oriented x4, and Attentive      Response to Learning: Able to verbalize current knowledge/experience and Progressing to goal      Endings: None Reported    Modes of Intervention: Activity and Movement      Discipline Responsible: /Counselor      Signature:  MIESHA RAMÍREZ

## 2024-05-07 NOTE — GROUP NOTE
Group Therapy Note    Date: 5/7/2024    Group Start Time:  2:00 PM  Group End Time:  2:45 PM  Group Topic: Wrap-Up    [facility]    Sal Capellan LCSW        Group Therapy Note    This writer facilitated the wrap up group. The patients were encouraged to completed the afternoon assessment to identify mood and any safety concerns. The patients were were encouraged to complete a tapping exercise. The patients were encouraged to complete an afternoon agenda prioritizing self-care or practicing a coping strategy.     Attendees: 5       Patient's Goal:  Identify mood, practice tapping, plan for self-care       Notes: The patient participated in the tapping exercise. The patient completed the check in assessment and denied SI/HI. The patient completed the afternoon agenda. The patient shared she is attending a sound therapy class at a local library following the group.  The patient will continue to identify mood and plan for self-care during the wrap up group.     Status After Intervention:  Unchanged    Participation Level: Active Listener and Interactive    Participation Quality: Appropriate, Attentive, Sharing, and Supportive      Speech:  normal      Thought Process/Content: Logical      Affective Functioning: Congruent      Mood: euthymic      Level of consciousness:  Alert, Oriented x4, and Attentive      Response to Learning: Able to verbalize current knowledge/experience, Capable of insight, and Progressing to goal      Endings: None Reported    Modes of Intervention: Support, Socialization, and Activity      Discipline Responsible: /Counselor      Signature:  Sal Capellan LCSW

## 2024-05-07 NOTE — GROUP NOTE
Group Therapy Note    Date: 5/7/2024    Group Start Time: 10:40 AM  Group End Time: 11:30 AM  Group Topic: Cognitive Skills    [facility]    Johan Mackey MSW        Group Therapy Note      This writer facilitated group on \" dissecting the problem.\" Patients were requested to identify one current challenging situation and reflect on the following factors that either hinders them from approaching the challenge or causes them to feel worse about it over time. Patients completed worksheet and reflected on it with each other.       Attendees: 10      Patient's Goal: to identify triggers, to process emotions, to identity cognitive distortions, to practice problem solving       Notes:  Pt was present and engaged in group. Patient related to peer struggling with feeling tired about using skills all the time to problem solve and regulate. Pt was receptive to feedback and content. Pt will continue to work towards goal.    Status After Intervention:  Improved    Participation Level: Active Listener    Participation Quality: Appropriate      Speech:  normal      Thought Process/Content: Logical      Affective Functioning: Congruent      Mood: anxious      Level of consciousness:  Alert and Oriented x4      Response to Learning: Able to retain information, Capable of insight, and Progressing to goal      Endings: None Reported    Modes of Intervention: Education, Support, and Exploration      Discipline Responsible: /Counselor      Signature:  MIESHA Hernandez
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
## 2024-05-07 NOTE — SUICIDE SAFETY PLAN
SAFETY PLAN    A suicide Safety Plan is a document that supports someone when they are having thoughts of suicide.    Warning Signs that indicate a suicidal crisis may be developing: What (situations, thoughts, feelings, body sensations, behaviors, etc.) do you experience that lets you know you are beginning to think about suicide?  1. Indecisiveness, jumbled words  2. Shortness of breath  3. Whole body is tense    Internal Coping Strategies:  What things can I do (relaxation techniques, hobbies, physical activities, etc.) to take my mind off my problems without contacting another person?  1. Working out   2. Eating food  3. Going to the park    People and social settings that provide distraction: Who can I call or where can I go to distract me?  1. Name: Katherine  Phone: [phone]  2. Name: Madison  Phone: [phone]   3. Place: park near [place]          4. Place: [facility]    People whom I can ask for help: Who can I call when I need help - for example, friends, family, clergy, someone else?  1. Name: Katherine  Phone: [phone]  2. Name: Madison  Phone: [phone]   3. Name: Daisy Phone: [phone]    Professionals or Behavioral Health agencies I can contact during a crisis: Who can I call for help - for example, my doctor, my psychiatrist, my psychologist, a mental health provider, a suicide hotline?    1. Clinician Name: Johan Mackey   Phone: [phone]       Clinician Pager or Emergency Contact #: [phone]     2. Clinician Name: Matt Bower    Phone: [phone]       Clinician Pager or Emergency Contact #: [phone]    3. Suicide Prevention Lifeline: 3-988-647-TALK (0024)    4. Local Behavioral Health Emergency Services -  for example, [facility], [facility] suicide hotline, [facility] Hotline: [facility]       Emergency Services Address: [address] [facility] [address]       Emergency Services Phone: [phone]    Making the environment safe: How can I make my

## 2024-05-08 ENCOUNTER — HOSPITAL ENCOUNTER (OUTPATIENT)
Facility: HOSPITAL | Age: 24
Setting detail: RECURRING SERIES
Discharge: HOME | End: 2024-05-11
Payer: MEDICAID

## 2024-05-08 VITALS
TEMPERATURE: 98.1 F | DIASTOLIC BLOOD PRESSURE: 98 MMHG | HEART RATE: 84 BPM | OXYGEN SATURATION: 100 % | SYSTOLIC BLOOD PRESSURE: 132 MMHG

## 2024-05-08 PROCEDURE — 90853 GROUP PSYCHOTHERAPY: CPT

## 2024-05-08 PROCEDURE — 90834 PSYTX W PT 45 MINUTES: CPT

## 2024-05-08 NOTE — GROUP NOTE
Group Therapy Note    Date: 5/8/2024    Group Start Time: 12:00 PM  Group End Time:  1:00 PM  Group Topic: Process Group - Outpatient    [facility]    Sal Capellan LCSW        Group Therapy Note    This writer facilitated the treatment planning group. The patients were encouraged to participate in an ice breaker exercise to welcome the new peer. The patient shared their names, length of time in this treatment, expectation vs experience in group, and who in history they would have lunch with and why.  This conversation led to a conversation about past traumatic experiences, personality characteristics, and being open to help. This writer encouraged patients to participate in a \"shake it out\" exercise and affirmation exercise to close the group.    Attendees: 7       Patient's Goal:      Notes:  The patient participated in the ice breaker exercise to welcome a new peer. The patient participated in discussion following the ice breaker exercise and displayed empathy towards peers as well as sharing personal experience with past treatment. The patient will continue to participate in ice breaker exercises and self-disclosure during the treatment planning group.     Status After Intervention:  Unchanged    Participation Level: Active Listener and Interactive    Participation Quality: Appropriate, Attentive, Sharing, and Supportive      Speech:  normal      Thought Process/Content: Logical      Affective Functioning: Congruent      Mood: euthymic      Level of consciousness:  Alert, Oriented x4, and Attentive      Response to Learning: Able to verbalize current knowledge/experience, Capable of insight, and Progressing to goal      Endings: None Reported    Modes of Intervention: Support, Socialization, and Activity      Discipline Responsible: /Counselor      Signature:  Sal Capellan LCSW

## 2024-05-08 NOTE — GROUP NOTE
Group Therapy Note    Date: 5/8/2024    Group Start Time:  2:00 PM  Group End Time:  2:45 PM  Group Topic: Wrap-Up    [facility]    Sal Capellan LCSW        Group Therapy Note    This writer facilitated the wrap up group. The patients were encouraged to complete the afternoon assessment to identify mood, reflect on the treatment day, and identify any safety concerns. The patients were encouraged participate in a farewell exercise with a discharging peer. The patients were encouraged to complete an afternoon agenda prioritizing practicing a coping strategy and self-care.    Attendees: 9       Patient's Goal:  Identify mood and plan for self-care       Notes: The patient completed their wrap up assessment and denied SI/HI.  The patient participated in the [employer] activity for peer. The patient shared details of experience with discharging peer. The patient completed an afternoon agenda. The patient shared that she will spend time in the library for self-care this afternoon.  The patient will continue to identify mood and plan for self-care during the wrap up group.     Status After Intervention:  Unchanged    Participation Level: Active Listener and Interactive    Participation Quality: Appropriate, Attentive, and Sharing      Speech:  normal      Thought Process/Content: Logical      Affective Functioning: Congruent      Mood: euthymic      Level of consciousness:  Alert, Oriented x4, and Attentive      Response to Learning: Able to verbalize current knowledge/experience, Capable of insight, and Progressing to goal      Endings: None Reported    Modes of Intervention: Support, Socialization, and Activity      Discipline Responsible: /Counselor      Signature:  Sal Capellan LCSW

## 2024-05-08 NOTE — BH NOTE
Partial Hospitalization Program Individual Psychotherapy Note      Diagnosis: F43.10 Post traumatic stress disorder, F41.9 anxiety disorder     Goal: Individual session and review of treatment plan         Psychotherapy Session    Start time: 10:50  Stop time: 11:30        Patient Mental Status and Mood/Affect:Anxious    Patient Behavior and Appearance: Cooperative and Good eye contactshows no evidence of impairment    Intervention/Techniques: Informed, Validated/Supported, Assigned, Reflected, Guided, Challenged, Reframed, Promoted Peer Support, and Provided Feedback    Focus of Session/Patient Response and Progress Towards Goal:     Patient actively participated and engaged with clinician to collaborate on creating treatment plan.  Patient denied SI/HI. Patient reports understanding that she will be meeting with clinician on a weekly basis to update progress towards goal.      Narrative:     Patient reported feeling very anxious and shared having the \" worst panic attack\" last night which was exacerbated by her dad yelling at her to stop panicking. Pt stated, \" my jaw was shaking, I had a hard time breathing, and I felt stuck.\"  This writer empathized with patient and facilitated a brief tapping activity due to escalating mental distress presentation. Pt was receptive. Pt reported that she ended up going outside, sitting on the ground, and talking to mom which helped her regulate. This writer acknowledged the use of her skill and encouraged her to create a card to keep on night stand for her to reach out to when triggered by dream and/or physical sensation. This writer also prompted her to reflect on how old she felt when she woke up and had panic attack to which she responded age 11. This writer encouraged her to practice self-compassion and visualize offering the inner child comfort. Pt was receptive. Pt transitioned to processing hesitancy around taking medication. She reported not taking medications last night

## 2024-05-08 NOTE — GROUP NOTE
Group Therapy Note    Date: 5/8/2024    Group Start Time:  9:00 AM  Group End Time:  9:40 AM  Group Topic: Community Meeting    [facility]    Johan Mackey MSW      Group Therapy Note    This writer facilitated the community group and requested patients to complete check in sheets. Patients were encouraged to jot an intention for the day and reflect on steps and support needed to progress towards their goal. To conclude, patients were requested to read \" Autobiography in Five Short Chapters\" and process it with each other.       Attendees: 7        Patient's Goal: to identify goal, to assess safety, to process emotions       Notes:  Pt was present and engaged in group. Patient identified goal of the day is to process anxiety around medications. Patient related to peer feeling like she is stuck. Patient completed check in sheet and denied SI/HI.     Status After Intervention:  Improved    Participation Level: Active Listener    Participation Quality: Appropriate      Speech:  normal      Thought Process/Content: Logical      Affective Functioning: Congruent      Mood: anxious      Level of consciousness:  Alert and Oriented x4      Response to Learning: Capable of insight and Progressing to goal      Endings: None Reported    Modes of Intervention: Support, Socialization, and Exploration      Discipline Responsible: /Counselor      Signature:  MIESHA Hernandez

## 2024-05-08 NOTE — GROUP NOTE
Group Therapy Note    Date: 5/8/2024    Group Start Time:  9:44 AM  Group End Time: 10:30 AM  Group Topic: Process Group - Outpatient    [facility]    Nayana Barrett MSW        Group Therapy Note    Writer facilitated process group. Writer opened by giving further space to a specific peer who was, per staff report, unable to finish processing in the last group. Writer encouraged peer feedback as well as provided relevant education on healthy communication, boundaries, and attachment styles. Writer provided space for other peers to share and receive feedback.    Attendees: 8       Patient's Goal:   engaging with peer feedback and support.    Notes:  Pt presented as attentive and engaged as evidenced by spontaneous participation. Pt expressed empathy based on shared experiences with peers. Pt suggested healthy coping skills based on her experiences. Pt will continue to work on engaging with peer feedback and support.    Status After Intervention:  Unchanged    Participation Level: Active Listener and Interactive    Participation Quality: Appropriate, Attentive, Sharing, and Supportive      Speech:  normal      Thought Process/Content: Logical      Affective Functioning: Congruent      Mood: euthymic      Level of consciousness:  Alert, Oriented x4, and Attentive      Response to Learning: Able to verbalize current knowledge/experience and Progressing to goal      Endings: None Reported    Modes of Intervention: Education and Support      Discipline Responsible: /Counselor      Signature:  MIESHA RAMÍREZ

## 2024-05-08 NOTE — GROUP NOTE
Group Therapy Note    Date: 5/8/2024    Group Start Time:  1:10 PM  Group End Time:  2:00 PM  Group Topic: Relapse Prevention    [facility]    Johan Mackey MSW        Group Therapy Note    Peer , Joseph led group on relapse prevention. The group opened with specialist sharing personal story and poems that helped her get through recovery. Patients were encouraged to share and reflect on coping skills that have helped in the past to urge surf. To conclude, patients were encouraged to ask questions and engage with speaker.         Attendees: 7        Patient's Goal: to learn about relapse prevention, to identify coping skills       Notes:  Pt was present and alert in group. Pt stepped out for five minutes and returned. Pt respectfully listened to speaker and had no questions. Pt did not verbally engage in discussion.    Status After Intervention:  Unchanged    Participation Level: Active Listener    Participation Quality: Appropriate      Speech:  normal      Thought Process/Content: Logical      Affective Functioning: Congruent      Mood: anxious      Level of consciousness:  Alert      Response to Learning: Progressing to goal      Endings: None Reported    Modes of Intervention: Education, Support, Socialization, and Exploration      Discipline Responsible: /Counselor      Signature:  MIESHA Hernandez

## 2024-05-09 ENCOUNTER — HOSPITAL ENCOUNTER (OUTPATIENT)
Facility: HOSPITAL | Age: 24
Setting detail: RECURRING SERIES
Discharge: HOME | End: 2024-05-12
Payer: MEDICAID

## 2024-05-09 VITALS
DIASTOLIC BLOOD PRESSURE: 85 MMHG | HEART RATE: 68 BPM | OXYGEN SATURATION: 98 % | TEMPERATURE: 97.3 F | SYSTOLIC BLOOD PRESSURE: 131 MMHG

## 2024-05-09 PROCEDURE — 90853 GROUP PSYCHOTHERAPY: CPT

## 2024-05-09 NOTE — BH NOTE
This writer addressed patient leaving group multiple times and missing part of treatment. Patient reported overthinking about her new medications and struggling to stay still for too long. This writer empathized with pt and challenged her to reframe thoughts associated with those actions. Patient was receptive. In collaboration with this writer, pt agreed to add the following to treatment plan: stretch by the window before and after group, chew gum, and doodle emotions. This writer informed her of the importance of staying in group and stated she would be given two 5 minutes break throughout the day and to use them when it is absolutely needed. Pt was receptive as evidenced by verbal agreement.

## 2024-05-09 NOTE — GROUP NOTE
Group Therapy Note    Date: 5/9/2024    Group Start Time:  1:10 PM  Group End Time:  2:00 PM  Group Topic: Psychoeducation    [facility]    Nayana Barrett MSW        Group Therapy Note    Writer facilitated psycho-education group on anger. Writer provided handouts and education on the anger cycle and anger iceberg. Writer provided education on stigma around anger as well as the function of anger. Writer encouraged pts to ask questions and discuss their experiences with anger, to include anger coping. Writer closed with brief introductions for a new peer.    Attendees: 6       Patient's Goal:  understanding and destigmatizing anger.    Notes:  Pt presented as attentive and engaged as evidenced by spontaneous participation. Pt reported being fidgetty at the beginning of group and presented as receptive to use of a fidget toy. Pt discussed getting stuck in the stage of physical symptoms of emotions and asked for feedback. Pt will continue to work on understanding and destigmatizing anger.    Status After Intervention:  Unchanged    Participation Level: Active Listener and Interactive    Participation Quality: Appropriate, Attentive, and Sharing      Speech:  normal      Thought Process/Content: Logical      Affective Functioning: Congruent      Mood: anxious      Level of consciousness:  Alert, Oriented x4, and Attentive      Response to Learning: Able to verbalize current knowledge/experience, Capable of insight, and Progressing to goal      Endings: None Reported    Modes of Intervention: Education      Discipline Responsible: /Counselor      Signature:  MIESHA RAMÍREZ    
                                                                      Group Therapy Note    Date: 5/9/2024    Group Start Time:  2:00 PM  Group End Time:  2:45 PM  Group Topic: Wrap-Up    [facility]    Johan Mackey MSW        Group Therapy Note      This writer facilitated group and opened with requesting patients to complete wrap up sheet. Patients were encouraged to  a sticky note and jot down along with reflect out loud one thing that stuck with them today. To conclude, this writer facilitated gratitude hot potato.       Attendees: 10      Patient's Goal: to assess safety, to identify takeaway, to practice gratitude       Notes:  Pt was present and alert in group. Pt could not identify takeaway and shared feeling bad for sharing. Pt participated in activity and completed check out sheet. Pt denied SI/HI.     Status After Intervention:  Unchanged    Participation Level: Active Listener    Participation Quality: Appropriate      Speech:  normal      Thought Process/Content: Logical      Affective Functioning: Congruent      Mood: anxious      Level of consciousness:  Alert      Response to Learning: Progressing to goal      Endings: None Reported    Modes of Intervention: Support, Socialization, and Exploration      Discipline Responsible: /Counselor      Signature:  MIESHA Hernandez    
                                                                      Group Therapy Note    Date: 5/9/2024    Group Start Time:  9:00 AM  Group End Time:  9:40 AM  Group Topic: Community Meeting    [facility]    Nayana Barrett MSW        Group Therapy Note    Writer facilitated the morning check in community group focused on evaluating presentation, assessing for safety, and processing sources of stress or bubba. Writer facilitated a symptom and safety check in using the morning check in form.  Writer encouraged patients to share events in their lives, identify triggers for difficult emotions, and identify ways in which the patients perceive themselves to be making progress. Writer prompted and supported peer feedback.    Attendees: 8       Patient's Goal: disclosing safety concerns and engaging with peer feedback and support.    Notes:  Pt denied SI/HI/SI on check in form. Pt reported relating to a peer's familial experiences and attachment issues. Pt reported anxiety about her medications and asked to have her vitals re-taken. Pt will continue to work on disclosing safety concerns and engaging with peer feedback and support.    Status After Intervention:  Unchanged    Participation Level: Active Listener and Interactive    Participation Quality: Appropriate and Attentive      Speech:  normal      Thought Process/Content: Logical      Affective Functioning: Congruent      Mood: anxious      Level of consciousness:  Alert, Oriented x4, and Attentive      Response to Learning: Able to verbalize current knowledge/experience and Progressing to goal      Endings: None Reported    Modes of Intervention: Support and Socialization      Discipline Responsible: /Counselor      Signature:  MIESHA RAMÍREZ    
                                                                      Group Therapy Note    Date: 5/9/2024    Group Start Time:  9:40 AM  Group End Time: 10:30 AM  Group Topic: Process Group - Outpatient    [facility]    Carina Garcia MSW        Group Therapy Note    Attendees: 8    Writer facilitated process group this morning. Writer opened by asking if any of the patients had anything they wanted to bring to the group to process. Several patients did, and writer facilitated resulting discussions until the end of the group.        Patient's Goal:  Participate in group therapy, engage in open sharing and processing with peers.     Notes:  Patient engaged fairly in group this morning. She stepped out several times but did always return after a few minutes. Patient provided good support for her peers while present by sharing her own experiences and insight, especially about anxiety. Patient will continue with identified treatment goals in further groups.     Status After Intervention:  Unchanged    Participation Level: Active Listener and Interactive    Participation Quality: Appropriate and Attentive      Speech:  normal      Thought Process/Content: Logical      Affective Functioning: Congruent      Mood: anxious      Level of consciousness:  Alert and Oriented x4      Response to Learning: Able to verbalize current knowledge/experience, Capable of insight, and Progressing to goal      Endings: None Reported    Modes of Intervention: Support, Socialization, and Exploration      Discipline Responsible: /Counselor      Signature:  MIESHA Perez    
                                                                      Group Therapy Note    Date: 5/9/2024    Group Start Time: 10:40 AM  Group End Time: 11:30 AM  Group Topic: Cognitive Skills    [facility]    Johan Mackey MSW        Group Therapy Note    This writer facilitated group and opened with encouraging pt to process situation he requested to seek feedback from peers. Pt's engaged in discussion and were supportive of each other. Following discussion, patients were introduced to wise mind and encouraged to do an example on the board. To conclude, patients were provided with a worksheet encouraged them to identify emotional, rational, and nava mind of a personal situation.       Attendees: 7      Patient's Goal: to process emotions, to identify distortions, to practice self compassion, to learn about WISE mind skill, to improve problem solving skills      Notes:  Pt was present the first 5 minutes and last 5 minutes. Pt was absent for majority of the group to regulate emotions out. Pt encouraged to practice coping skills inside of group to stay the full time.    Status After Intervention:  Decompensated    Participation Level: None    Participation Quality: Resistant      Speech:  normal      Thought Process/Content: Logical      Affective Functioning: Congruent      Mood: depressed      Level of consciousness:  Inattentive      Response to Learning: Resistant      Endings: None Reported    Modes of Intervention: Education, Support, and Socialization      Discipline Responsible: /Counselor      Signature:  MIESHA Hernandez    
goal      Endings: None Reported    Modes of Intervention: Education, Exploration, and Media      Discipline Responsible: /Counselor      Signature:  MIESHA Perez

## 2024-05-10 ENCOUNTER — HOSPITAL ENCOUNTER (OUTPATIENT)
Facility: HOSPITAL | Age: 24
Setting detail: RECURRING SERIES
Discharge: HOME | End: 2024-05-13
Payer: MEDICAID

## 2024-05-10 VITALS
DIASTOLIC BLOOD PRESSURE: 90 MMHG | SYSTOLIC BLOOD PRESSURE: 122 MMHG | OXYGEN SATURATION: 98 % | TEMPERATURE: 97.8 F | HEART RATE: 72 BPM

## 2024-05-10 PROCEDURE — 90853 GROUP PSYCHOTHERAPY: CPT

## 2024-05-10 NOTE — BH NOTE
OUTPATIENT PHYSICIAN PROGRESS NOTE      Chief Complaint/Symptoms/Impairments (as noted in Treatment Plan): \"I took the higher dose of prozac\"    Criteria for Continued Treatment (check all that apply):   [x] Preventing Decompensation   [] Improving Level of Functioning  [] Reducing Isolative Behaviors  [] Understanding Diagnosis and Need for Medications  [] Improving Treatment/Medication Compliance  [] Confronting Denial of Illness  [x] Stabilizing Level of Functioning  [x] Improving Emotional/Social/Cognitive Functioning  [] Decreasing Frequency of Hospitalizations    Suicidal/Homicidal ideations:   [x] Absent  [] Present  [] Passive  [] Intent  [] Plan  [] Death Wishes      CURRENT CONDITION OF PATIENT & PROGRESS ON TREATMENT PLAN    Subjective (ongoing complaints by patient regarding symptom severity, presentation, affect, function, etc.): She started Prozac 20 this am. Reports tried lower dose inderal, but wasn't very helpful.  Ended up taking other half of pill which helped.      Behavior (side effects, changes in mental status, objective observations seen in individual sessions or by staff): Appropriate dress and grooming. Good EC. Speech WNL. Mood \"better\" affect congruent. Denies SI or HI.      Assessment/Plan (functional improvement and/or ongoing impairment, response to treatment, plan for future ongoing treatment and medication management to include revisions): continue current medications        [x] NO NEW Medications at this time.   [] New Medication prescribed and prescription provided to patient  [] PHP Nurse notified of changes as needed    [] Regarding any medications: patient instructed on purpose, benefits, side effects,   risks, and alternative treatments. Patient verbalizes understanding of instructions and has had the opportunity to ask questions.    Rachael Suggs MD  05/10/24   10:51 AM

## 2024-05-10 NOTE — GROUP NOTE
Group Therapy Note    Date: 5/10/2024    Group Start Time:  9:00 AM  Group End Time:  9:40 AM  Group Topic: Community Meeting    [facility]    Nayana Barrett MSW        Group Therapy Note    Writer facilitated the morning check in community group focused on evaluating presentation, assessing for safety, and processing recent events. Writer facilitated a symptom and safety check in using the morning check in form.  Writer encouraged patients to share events in their lives, identify triggers for difficult emotions, and identify ways in which the patients perceive themselves to be making progress. Writer prompted and supported peer feedback.     Attendees: 6       Patient's Goal:  engaging in peer feedback and support.    Notes:  Pt denied SI/TIRSO/HI on check in sheet. Pt presented as attentive and engaged as evidenced by spontaneous participation. Pt endorsed relating to experiences of substances interacting in discussion with a peer. Pt endorsed relating to her peers around medical anxiety and discussed her experiences. Pt will continue to work on engaging in peer feedback and support.    Status After Intervention:  Unchanged    Participation Level: Active Listener and Interactive    Participation Quality: Appropriate, Attentive, and Sharing      Speech:  normal      Thought Process/Content: Logical      Affective Functioning: Congruent      Mood: euthymic      Level of consciousness:  Alert, Oriented x4, and Attentive      Response to Learning: Able to verbalize current knowledge/experience and Progressing to goal      Endings: None Reported    Modes of Intervention: Support and Socialization      Discipline Responsible: /Counselor      Signature:  MIESHA RAMÍREZ

## 2024-05-10 NOTE — GROUP NOTE
Group Therapy Note    Date: 5/10/2024    Group Start Time:  2:00 PM  Group End Time:  2:45 PM  Group Topic: Wrap-Up    [facility]    Carina Garcia, MSW        Group Therapy Note    Attendees: 10     Writer facilitated wrap up group this afternoon. Writer opened by encouraging patients to fill out their wrap up sheets. Writer also provided patients with their safety plans and provided brief education about when to use them and what to do if they needed to make any changes. Writer then asked patients to engage in a reflective exercise and identify one thing they were grateful for this week, one thing they learned, one thing they accomplished, something they found bubba in, and something they did/planned to do for self care. Writer provided brief reflection on how practicing positivity is important, which lasted until the end of group.        Patient's Goal:   Participate in group therapy, complete wrap up sheet, complete GLADS activity and increase understanding of practicing intentional positivity.     Notes:  Patient engaged well in group this afternoon. She filled out her check out sheet, declining any SI or HI. She identified low depression and anger and higher anxiety. Patient shared that she is grateful for the program and for her friends this week, has learned to be more open minded, achieved sharing more in group, found bubba in a TV show and in food, and has been trying to read for self care. Patient will continue with identified treatment goals in further groups.     Status After Intervention:  Improved    Participation Level: Active Listener and Interactive    Participation Quality: Appropriate, Attentive, and Sharing      Speech:  normal      Thought Process/Content: Logical      Affective Functioning: Congruent      Mood: euthymic      Level of consciousness:  Alert and Oriented x4      Response to Learning: Able to verbalize current

## 2024-05-10 NOTE — GROUP NOTE
Group Therapy Note    Date: 5/10/2024    Group Start Time:  1:10 PM  Group End Time:  2:00 PM  Group Topic: Psychotherapy    [facility]    Johan Mackey MSW        Group Therapy Note    This writer continued activity from previous group and requested patients to share magazine covers with each other to write affirmations. Pt engaged in activity and reflected on the impact affirmations can have on self-esteem. To conclude, patients were asked to three affirmations written by their peers out loud.       Attendees: 8        Patient's Goal: to identify positive wellbeing steps, to improve self-esteem, to engage with peers       Notes:  Pt was present and engaged in group. Patient identified \" I am capable\" as an affirmation that stuck with her after finishing the activity. Pt was supportive towards peers and will continue to work towards goal.    Status After Intervention:  Improved    Participation Level: Active Listener    Participation Quality: Appropriate      Speech:  normal      Thought Process/Content: Logical      Affective Functioning: Congruent      Mood: anxious      Level of consciousness:  Alert and Oriented x4      Response to Learning: Progressing to goal      Endings: None Reported    Modes of Intervention: Support, Socialization, and Exploration      Discipline Responsible: /Counselor      Signature:  MIESHA Hernandez

## 2024-05-10 NOTE — GROUP NOTE
Group Therapy Note    Date: 5/10/2024    Group Start Time: 10:40 AM  Group End Time: 11:30 AM  Group Topic: Cognitive Skills    [facility]    Tala Tanner MSW        Group Therapy Note    Patients were given educational materials related to boundaries, and how bouondaries can differ based on people being in our inner, middle, and outer social circles. Patients were encouraged to discuss setting boundaries, barriers, and differences in boundaries between different people in their lives. Patients were encouraged to offer feedback and reflection to peers.    Attendees: 6       Patient's Goal:  Identify and discuss boundaries, discuss personal relationships, reflect on feelings and experiences with boundaries     Notes:  The patient engaged actively in boundaries group. The patient discussed differences between boundaries with friends and family. The patient discussed the importance of communication in boundaries. The patient offered feedback and support to peers. The patient will continue to practice reflecting and setting boundaries.    Status After Intervention:  Unchanged    Participation Level: Active Listener and Interactive    Participation Quality: Appropriate, Attentive, Sharing, and Supportive      Speech:  normal      Thought Process/Content: Logical  Linear      Affective Functioning: Congruent      Mood: euthymic      Level of consciousness:  Alert, Oriented x4, and Attentive      Response to Learning: Able to verbalize current knowledge/experience, Capable of insight, and Progressing to goal      Endings: None Reported    Modes of Intervention: Education and Exploration      Discipline Responsible: /Counselor      Signature:  MIESHA Garrison

## 2024-05-10 NOTE — GROUP NOTE
Group Therapy Note    Date: 5/10/2024    Group Start Time:  9:40 AM  Group End Time: 10:30 AM  Group Topic: Process Group - Outpatient    [facility]    Nayana Barrett MSW        Group Therapy Note    Writer facilitated process group. Writer utilized therapeutic reflections, therapeutic silence, brief education, and open ended questions to support discussion of mental health stigma, family dynamics, and recent events in pts' lives. Writer encouraged peer feedback. Writer closed with discussion of pts' plans for the weekends to identify upcoming positive events and support appropriate socialization.    Attendees: 5       Patient's Goal:  engaging with peer feedback and support.    Notes:  Pt presented as attentive and engaged as evidenced by spontaneous participation. Pt shared about a recent panic attack, which she identified as \"the worst of [her] life.\" Pt shared about her father's reaction and her relationship with him. Pt contrasted this with the response of her friend to a recent panic attack. Pt reported getting a new job. Pt interacted appropriately with peers. Pt will continue to work on engaging with peer feedback and support.    Status After Intervention:  Unchanged    Participation Level: Active Listener and Interactive    Participation Quality: Appropriate, Attentive, and Sharing      Speech:  normal      Thought Process/Content: Logical      Affective Functioning: Congruent      Mood: anxious      Level of consciousness:  Alert, Oriented x4, and Attentive      Response to Learning: Able to verbalize current knowledge/experience, Capable of insight, and Progressing to goal      Endings: None Reported    Modes of Intervention: Support and Socialization      Discipline Responsible: /Counselor      Signature:  MIESHA RAMÍREZ

## 2024-05-10 NOTE — GROUP NOTE
Group Therapy Note    Date: 5/10/2024    Group Start Time: 12:00 PM  Group End Time:  1:00 PM  Group Topic: Psychotherapy    [facility]    Carina Garcia, MSW        Group Therapy Note    Attendees: 10    Writer facilitated psychotherapy group centered around creative expression and self esteem this afternoon. Writer opened group by explaining the activity to patients: they would paint a self portrait and their peers would then write affirmations for them around the portrait, with the end result to resemble a magazine cover. Writer then set up creative expression group for patients and encouraged them to use the rest of the time to create their magazine cover. Writer did not get to the affirmations part of the activity during this group and asked next group facilitator to complete that part of the activity.        Patient's Goal:  Participate in group therapy, engage in creative expression and self esteem building.     Notes:  Patient engaged well in group this afternoon. She chose colored pencils as her medium and asked if she could use a line from a song as her headline; writer agreed. Patient interacted well with her peers as evidenced by smiling and laughing with them. Patient will continue with identified treatment goals in further groups.     Status After Intervention:  Improved    Participation Level: Active Listener and Interactive    Participation Quality: Appropriate, Attentive, Sharing, and Supportive      Speech:  normal      Thought Process/Content: Logical      Affective Functioning: Congruent      Mood: euthymic      Level of consciousness:  Alert and Oriented x4      Response to Learning: Able to verbalize current knowledge/experience, Capable of insight, and Progressing to goal      Endings: None Reported    Modes of Intervention: Support, Socialization, Exploration, and Activity      Discipline Responsible: Social

## 2024-05-13 ENCOUNTER — HOSPITAL ENCOUNTER (OUTPATIENT)
Facility: HOSPITAL | Age: 24
Setting detail: RECURRING SERIES
Discharge: HOME | End: 2024-05-16
Payer: MEDICAID

## 2024-05-13 VITALS — DIASTOLIC BLOOD PRESSURE: 89 MMHG | SYSTOLIC BLOOD PRESSURE: 118 MMHG | HEART RATE: 77 BPM | TEMPERATURE: 97.6 F

## 2024-05-13 PROCEDURE — 90853 GROUP PSYCHOTHERAPY: CPT

## 2024-05-13 NOTE — GROUP NOTE
Group Therapy Note    Date: 5/13/2024    Group Start Time:  9:40 AM  Group End Time: 10:30 AM  Group Topic: Process Group - Outpatient    [facility]    Johan Mackey MSW        Group Therapy Note    This writer facilitated process group and encouraged patient to continue processing about this weekend's interpersonal conflict. Following discussion and exchange of feedback, another patient processed anxiety around staying home alone while boyfriend was out of town.       Attendees: 9          Patient's Goal: to process emotions, to engage with peers, to identify problem-solving methods         Notes:  Pt was present and engaged in group. Pt related to peer about struggling with adjusting to PHP schedule especially for the first week. Pt encouraged peer to not give up as it get easier per personal experience. Pt also related to peer struggling with medical anxiety and provided support by saying, \" You are not alone.\"    Status After Intervention:  Improved    Participation Level: Active Listener    Participation Quality: Appropriate      Speech:  normal      Thought Process/Content: Logical      Affective Functioning: Congruent      Mood: anxious      Level of consciousness:  Alert and Oriented x4      Response to Learning: Capable of insight and Progressing to goal      Endings: None Reported    Modes of Intervention: Support, Socialization, and Exploration      Discipline Responsible: /Counselor      Signature:  MIESHA Hernandez

## 2024-05-13 NOTE — GROUP NOTE
Group Therapy Note    Date: 5/13/2024    Group Start Time:  1:10 PM  Group End Time:  2:00 PM  Group Topic: Psychoeducation    [facility]    Nayana Barrett MSW        Group Therapy Note    Writer facilitated psychoeducation group. Writer opened with providing processing time for the pts who did not get a chance to share in the last group. Writer transitioned to the inner and outer masks activity to support pts in reflecting on the discrepancy between how they present themselves to others versus their internal experience. Writer encouraged peer feedback and discussion.     Attendees: 9       Patient's Goal:   identifying and processing discrepancies in internal experience versus outer presentation.    Notes:  Pt presented as attentive and engaged as evidenced by spontaneous participation. Pt identified her outer mask as being very bubbly and playful while her inner masks includes a broader range of emotions, to include difficult ones. Pt reported the bubbly side of her to be the part she likes the most of herself and presented as receptive to feedback regarding this. Pt will continue to work on identifying and processing discrepancies in internal experience versus outer presentation.    Status After Intervention:  Unchanged    Participation Level: Active Listener and Interactive    Participation Quality: Appropriate, Attentive, and Sharing      Speech:  normal      Thought Process/Content: Logical      Affective Functioning: Congruent      Mood: anxious      Level of consciousness:  Alert, Oriented x4, and Attentive      Response to Learning: Able to verbalize current knowledge/experience, Capable of insight, and Progressing to goal      Endings: None Reported    Modes of Intervention: Exploration and Activity      Discipline Responsible: /Counselor      Signature:  MIESHA RAMÍREZ

## 2024-05-13 NOTE — GROUP NOTE
Group Therapy Note    Date: 5/13/2024    Group Start Time: 10:41 AM  Group End Time: 11:30 AM  Group Topic: Cognitive Skills    [facility]    Nayana Barrett MSW        Group Therapy Note    Writer facilitated cognitive skills group. Writer provided handouts on the cognitive model and cognitive distortions. Writer provided education on the cognitive model and encouraged pts to discuss their knowledge of it for engagement. Writer paused and transitioned to supporting peer introductions and ice breakers when a new pt joined the group. Writer encouraged rapport building through open ended questions to support discussion of the ice breaker question.    Attendees: 10       Patient's Goal:  learning about the cognitive model and building rapport with peers.    Notes:  Pt presented as anxious as evidenced by inability to sit still. Pt presented as more and more agitated before starting to cry and walking quickly out of group. Pt returned and sat against the wall. Pt participated appropriately in peer introductions and shared about a poem her grandfather told her when she was young. Pt also shared about a friend she has many shared experiences with and how supportive that experience has been. Pt will continue to work on learning about the cognitive model and building rapport with peers.    Status After Intervention:  Improved    Participation Level: Active Listener and Interactive    Participation Quality: Appropriate and Attentive      Speech:  normal      Thought Process/Content: Perseverating      Affective Functioning: Congruent      Mood: anxious      Level of consciousness:  Alert, Oriented x4, and Attentive      Response to Learning: Able to verbalize current knowledge/experience and Progressing to goal      Endings: None Reported    Modes of Intervention: Education and Socialization      Discipline Responsible: Social

## 2024-05-13 NOTE — GROUP NOTE
Group Therapy Note    Date: 5/13/2024    Group Start Time:  9:00 AM  Group End Time:  9:44 AM  Group Topic: Community Meeting    [facility]    Nayana Barrett MSW        Group Therapy Note    Writer facilitated community group with a focus on assessing safety and pt processing. Writer utilized safety check in forms to assess for safety needs. Writer reminded pts of the upcoming PHP closure on 5/17/2024. Writer encouraged pts to share about recent or upcoming events, to include stressors or positive developments. Writer encouraged peer feedback and discussion through open ended questions and therapeutic reflection.     Attendees: 8       Patient's Goal:  disclosing safety concerns and engaging with peer support.      Notes:  Pt denied SI/HI/TIRSO on the check in form. Pt provided appropriate feedback and endorsed relating to a peer. Pt shared about her own experiences appropriately. Pt will continue to work on disclosing safety concerns and engaging with peer support.      Status After Intervention:  Unchanged    Participation Level: Active Listener and Interactive    Participation Quality: Appropriate, Attentive, and Sharing      Speech:  normal      Thought Process/Content: Logical      Affective Functioning: Congruent      Mood: anxious      Level of consciousness:  Alert, Oriented x4, and Attentive      Response to Learning: Able to verbalize current knowledge/experience and Progressing to goal      Endings: None Reported    Modes of Intervention: Support and Socialization      Discipline Responsible: /Counselor      Signature:  MIESHA RAMÍREZ

## 2024-05-13 NOTE — GROUP NOTE
Group Therapy Note    Date: 5/13/2024    Group Start Time:  2:00 PM  Group End Time:  2:45 PM  Group Topic: Wrap-Up    [facility]    Johan Mackey MSW        Group Therapy Note    This writer facilitated and started by requesting patients to complete wrap up sheets. Patient were asked to turn it in and  a sticky note to jot down one key takeaway. Patients reflected out loud and were supportive of each other. To conclude, patients were asked to complete identity wheels and ask peers to share different answers.       Attendees: 10        Patient's Goal: to identify key takeaway, to improve social well-being            Notes:  Pt was present and engaged in group. Patient completed wrap up sheet and denied SI/HI. Patient reflected on positive thinking and how she wants to start creating a gratitude journal. Pt completed activity and was supportive of peers.    Status After Intervention:  Improved    Participation Level: Active Listener    Participation Quality: Appropriate      Speech:  normal      Thought Process/Content: Logical      Affective Functioning: Congruent      Mood: anxious      Level of consciousness:  Alert      Response to Learning: Able to verbalize/acknowledge new learning, Capable of insight, and Progressing to goal      Endings: None Reported    Modes of Intervention: Support, Socialization, and Exploration      Discipline Responsible: /Counselor      Signature:  MIESHA Hernandez

## 2024-05-13 NOTE — GROUP NOTE
Group Therapy Note    Date: 5/13/2024    Group Start Time: 12:00 PM  Group End Time:  1:00 PM  Group Topic: Psychotherapy    [facility]    Johan Mackey MSW        Group Therapy Note    This writer facilitated group and opened with playing a kain talk titled, \" Getting stuck in the negatives.\"  Patients reflected on content and engaged in a discussion on the impact forgiveness can have on accepting positive experiences. To conclude, patients were provided with a list of positive qualities and asked to share about a time they have demonstrated it.      Attendees: 10        Patient's Goal: to identify steps towards positive well-being, to challenge negative thinking       Notes:  Pt was present and alert in group. Pt did not verbally engage in group the first half but was able to complete activity and share responses with peers. Pt reflected on demonstrating the quality of love with peers.     Status After Intervention:  Unchanged    Participation Level: Active Listener    Participation Quality: Appropriate      Speech:  normal      Thought Process/Content: Logical      Affective Functioning: Congruent      Mood: anxious      Level of consciousness:  Alert and Oriented x4      Response to Learning: Capable of insight and Progressing to goal      Endings: None Reported    Modes of Intervention: Education, Support, and Socialization      Discipline Responsible: /Counselor      Signature:  MIESHA Hernandez     Pharmacokinetic Initial Assessment: IV Vancomycin    Assessment/Plan:    Initiate intravenous vancomycin with loading dose of 1500 mg once with subsequent doses when random concentrations are less than 20 mcg/mL  Desired empiric serum trough concentration is 10 to 20 mcg/mL  Draw vancomycin random level on 11/30 at approximately 0430.  Pharmacy will continue to follow and monitor vancomycin.      Nephrology on board and will evaluate need for RRT overnight.     Please contact pharmacy at extension [phone] with any questions regarding this assessment.     Thank you for the consult,   Devan Ortega       Patient brief summary:  Luis Hurd is a 35 y.o. male initiated on antimicrobial therapy with IV Vancomycin for treatment of suspected intra-abdominal infection    Drug Allergies:   Review of patient's allergies indicates:  No Known Allergies    Actual Body Weight:   129 kg    Renal Function:   Estimated Creatinine Clearance: 29.7 mL/min (A) (based on SCr of 5 mg/dL (H)).,     Dialysis Method (if applicable):  RRT to be evaluated by Nephrology    CBC (last 72 hours):  Recent Labs   Lab Result Units 11/27/19 0457 11/28/19  0545 11/29/19  0637   WBC K/uL 21.84* 27.69* 40.97*   Hemoglobin g/dL 8.9* 8.9* 8.7*   Hematocrit % 27.6* 27.3* 26.8*   Platelets K/uL 109* 111* 126*   Gran% % 80.0* 78.0* 79.5*   Lymph% % 12.0* 11.0* 9.0*   Mono% % 5.0 5.0 2.5*   Eosinophil% % 1.0 2.0 2.5   Basophil% % 0.0 0.0 0.0   Differential Method  Manual Manual Manual       Metabolic Panel (last 72 hours):  Recent Labs   Lab Result Units 11/26/19 2303 11/27/19 0457 11/28/19  0545 11/29/19  0637   Sodium mmol/L  --  131* 130* 130*   Potassium mmol/L  --  4.4 4.5 4.5   Chloride mmol/L  --  95 94* 94*   CO2 mmol/L  --  25 24 23   Glucose mg/dL  --  124* 117* 117*   Glucose, UA  Negative  --   --   --    BUN, Bld mg/dL  --  64* 73* 87*   Creatinine mg/dL  --  3.9* 4.3* 5.0*   Albumin g/dL  --  2.9* 3.1* 2.7*   Total Bilirubin mg/dL  --  8.9*  10.3* 10.6*   Alkaline Phosphatase U/L  --  196* 201* 209*   AST U/L  --  114* 111* 125*   ALT U/L  --  39 36 36   Magnesium mg/dL  --  2.5 2.8* 3.1*   Phosphorus mg/dL  --  4.3 5.2* 6.3*       Drug levels (last 3 results):  No results for input(s): VANCOMYCINRA, VANCOMYCINPE, VANCOMYCINTR in the last 72 hours.    Microbiologic Results:  Microbiology Results (last 7 days)     Procedure Component Value Units Date/Time    Fungus culture [[id number]] Collected:  11/29/19 1556    Order Status:  Sent Specimen:  Body Fluid from Ascites Updated:  11/29/19 1557    Aerobic culture [[id number]] Collected:  11/29/19 1556    Order Status:  Sent Specimen:  Body Fluid from Ascites Updated:  11/29/19 1557    Gram stain [[id number]] Collected:  11/29/19 1556    Order Status:  Sent Specimen:  Body Fluid from Ascites Updated:  11/29/19 1557    Culture, Anaerobe [[id number]] Collected:  11/29/19 1556    Order Status:  Sent Specimen:  Body Fluid from Ascites Updated:  11/29/19 1557    Blood culture [[id number]] Collected:  11/29/19 1133    Order Status:  Sent Specimen:  Blood Updated:  11/29/19 1154    Narrative:       Collection has been rescheduled by T at 11/29/2019 11:02 Reason:   patient in bathroom.   Collection has been rescheduled by T at 11/29/2019 11:19 Reason:   Unable to collect  Collection has been rescheduled by T at 11/29/2019 11:02 Reason:   patient in bathroom.   Collection has been rescheduled by T at 11/29/2019 11:19 Reason:   Unable to collect    Blood culture [[id number]] Collected:  11/29/19 1143    Order Status:  Sent Specimen:  Blood Updated:  11/29/19 1154    Narrative:       Collection has been rescheduled by T at 11/29/2019 11:02 Reason:   patient in bathroom.   Collection has been rescheduled by T at 11/29/2019 11:19 Reason:   Unable to collect  Collection has been rescheduled by T at 11/29/2019 11:02 Reason:   patient in bathroom.   Collection has been rescheduled by [facility] at 11/29/2019 11:19 Reason:    Unable to collect    Culture, Anaerobe [[id number]] Collected:  11/26/19 0744    Order Status:  Completed Specimen:  Body Fluid from Peritoneal Fluid Updated:  11/29/19 1030     Anaerobic Culture Culture in progress    Aerobic culture [[id number]] Collected:  11/26/19 0744    Order Status:  Completed Specimen:  Body Fluid from Peritoneal Fluid Updated:  11/29/19 1001     Aerobic Bacterial Culture No growth    Blood culture [[id number]] Collected:  11/25/19 2230    Order Status:  Completed Specimen:  Blood Updated:  11/28/19 2312     Blood Culture, Routine No Growth to date      No Growth to date      No Growth to date      No Growth to date    Blood culture [[id number]] Collected:  11/25/19 2207    Order Status:  Completed Specimen:  Blood Updated:  11/28/19 2312     Blood Culture, Routine No Growth to date      No Growth to date      No Growth to date      No Growth to date

## 2024-05-14 ENCOUNTER — HOSPITAL ENCOUNTER (OUTPATIENT)
Facility: HOSPITAL | Age: 24
Setting detail: RECURRING SERIES
Discharge: HOME | End: 2024-05-17
Payer: MEDICAID

## 2024-05-14 VITALS
OXYGEN SATURATION: 100 % | DIASTOLIC BLOOD PRESSURE: 94 MMHG | HEART RATE: 86 BPM | TEMPERATURE: 98.4 F | SYSTOLIC BLOOD PRESSURE: 136 MMHG

## 2024-05-14 PROCEDURE — 90853 GROUP PSYCHOTHERAPY: CPT

## 2024-05-14 NOTE — GROUP NOTE
Group Therapy Note    Date: 5/14/2024    Group Start Time:  1:10 PM  Group End Time:  2:00 PM  Group Topic: Psychoeducation    [facility]    Nayana Barrett MSW        Group Therapy Note    Writer facilitated psychoeducation group. Writer brought in education on reframing harsh self-talk into compassionate self-talk and its benefits. Writer provided space for a pt to share about a stressor the pt was facing related to looking for jobs. Writer encouraged peer feedback and coping skill identification as well as provided education on various coping options. Writer transitioned to the self-compassion education. Writer utilized open ended and socratic questions to support pt engagement. Writer encouraged peer feedback and discussion.     Attendees: 5       Patient's Goal:  identifying and learning anxiety coping skills.    Notes:  Pt presented as attentive and engaged as evidenced by spontaneous participation. Pt provided appropriate and supportive feedback to a peer. Pt discussed her experiences with job hunting and provided validation. Pt will continue to work on identifying and learning anxiety coping skills.    Status After Intervention:  Unchanged    Participation Level: Active Listener and Interactive    Participation Quality: Appropriate, Attentive, and Sharing      Speech:  normal      Thought Process/Content: Logical      Affective Functioning: Congruent      Mood: euthymic      Level of consciousness:  Alert, Oriented x4, and Attentive      Response to Learning: Able to verbalize current knowledge/experience and Progressing to goal      Endings: None Reported    Modes of Intervention: Education, Support, and Socialization      Discipline Responsible: /Counselor      Signature:  MIESHA RAMÍREZ    
                                                                      Group Therapy Note    Date: 5/14/2024    Group Start Time:  2:00 PM  Group End Time:  2:45 PM  Group Topic: Wrap-Up    [facility]    Nayana Barrett MSW        Group Therapy Note    Writer facilitated a community wrap up group focused on assessing for safety and processing. Writer facilitated a symptom and safety check in using the afternoon check in form. Writer checked in on a specific pt whose affect changed in the previous group. Writer provided space for pt to vent and process a stressor. Writer encouraged peer feedback and challenging.    Attendees: 5       Patient's Goal:  sharing safety concerns and engaging with peer support.     Notes:  Pt denied SI/HI on wrap up form. Pt presented as attentive and engaged as evidenced by spontaneous participation. Pt provided validation of the valid for a peer and supported in re-framing and thought challenging. Pt challenged a peer appropriately and shared her experiences in treatment. Pt will continue to work on sharing safety concerns and engaging with peer support.     Status After Intervention:  Unchanged    Participation Level: Active Listener and Interactive    Participation Quality: Appropriate, Attentive, Sharing, and Supportive      Speech:  normal      Thought Process/Content: Logical      Affective Functioning: Congruent      Mood: euthymic      Level of consciousness:  Alert, Oriented x4, and Attentive      Response to Learning: Able to verbalize current knowledge/experience and Progressing to goal      Endings: None Reported    Modes of Intervention: Support      Discipline Responsible: /Counselor      Signature:  MIESHA RAMÍREZ    
                                                                      Group Therapy Note    Date: 5/14/2024    Group Start Time:  9:00 AM  Group End Time:  9:44 AM  Group Topic: Community Meeting    [facility]    Nayana Barrett MSW        Group Therapy Note    Writer facilitated community group with a focus on assessing safety and pt processing. Writer assessed for safety by providing, collecting, and reviewing the morning check in forms. Writer provided brief education on what the questions on the forms were asking. Writer encouraged pts to share about recent or upcoming events, to include stressors or positive developments. Writer encouraged peer feedback and discussion through open ended questions and therapeutic reflection. Writer provided education on the purpose of different groups in the program per pt request.    Attendees: 5       Patient's Goal: disclosing safety concerns and engaging with peer support.      Notes:  Pt denied SI/HI/TIRSO on the check in form. Pt presented as engaged as evidenced by interacting playfully and appropriately with peers in a manner benefiting group rapport. Pt provided appropriate feedback to peers and asked questions. Pt will continue to work on disclosing safety concerns and engaging with peer support.      Status After Intervention:  Unchanged    Participation Level: Active Listener and Interactive    Participation Quality: Appropriate, Attentive, and Supportive      Speech:  normal      Thought Process/Content: Logical      Affective Functioning: Congruent      Mood: anxious      Level of consciousness:  Alert, Oriented x4, and Attentive      Response to Learning: Able to verbalize current knowledge/experience and Progressing to goal      Endings: None Reported    Modes of Intervention: Support and Socialization      Discipline Responsible: /Counselor      Signature:  MIESHA RAMÍREZ    
                                                                      Group Therapy Note    Date: 5/14/2024    Group Start Time:  9:40 AM  Group End Time: 10:30 AM  Group Topic: Process Group - Outpatient    [facility]    Sal Capellan LCSW        Group Therapy Note    This writer facilitated the process group. A patient shared that he wanted to discuss something with the group. The patient was provided time to self-disclose and his peer were encouraged to provide feedback, support, and advice when appropriate.     Attendees: 5       Patient's Goal:  Self-disclose and provide feedback, support, and advice.      The patient participated in discussion on identifying, dating, and being accepted by family members. The patient provided peers with support, advice, and feedback during the discussion. The patient shared that she also has conflict with her family and has chosen not to have a relationship with some of them. The patient shared difficulties in finding a partner. The patient will continue to self-disclose and provide feedback to peers in the process group.      Status After Intervention:  Unchanged    Participation Level: Active Listener and Interactive    Participation Quality: Appropriate, Attentive, Sharing, and Supportive      Speech:  normal      Thought Process/Content: Logical      Affective Functioning: Congruent      Mood: euthymic      Level of consciousness:  Alert, Oriented x4, and Attentive      Response to Learning: Able to verbalize current knowledge/experience, Capable of insight, and Progressing to goal      Endings: None Reported    Modes of Intervention: Support, Socialization, and Activity      Discipline Responsible: /Counselor      Signature:  Sal Capellan LCSW    
                                                                      Group Therapy Note    Date: 5/14/2024    Group Start Time: 12:00 PM  Group End Time:  1:00 PM  Group Topic: Relaxation    [facility]    Sal Capellan LCSW        Group Therapy Note    This writer facilitated a relaxation group. The patients were encouraged to pick a song and reflect on emotions and reasons for picking the song. The patients discussed personal lives and memories that the songs reminded them of    Attendees: 5       Patient's Goal: Participate in relaxation activity    Notes:  The patient participated in the music relaxation activity. The patient discussed reason for pick song. The patient participated in discussion following music exercise.  The patient will continue to participate in coping practice during PHP and the relaxation group.       Status After Intervention:  Unchanged    Participation Level: Active Listener and Interactive    Participation Quality: Appropriate, Attentive, Sharing, and Supportive      Speech:  normal      Thought Process/Content: Logical      Affective Functioning: Congruent      Mood: euthymic      Level of consciousness:  Alert, Oriented x4, and Attentive      Response to Learning: Able to verbalize current knowledge/experience      Endings: None Reported    Modes of Intervention: Support, Socialization, and Activity      Discipline Responsible: /Counselor      Signature:  Sal Capellan LCSW    
/Counselor      Signature:  MIESHA RAMÍREZ

## 2024-05-14 NOTE — BH NOTE
OUTPATIENT PHYSICIAN PROGRESS NOTE      Chief Complaint/Symptoms/Impairments (as noted in Treatment Plan): DIA, PTSD    Criteria for Continued Treatment (check all that apply):   [] Preventing Decompensation   [] Improving Level of Functioning  [x] Reducing Isolative Behaviors  [] Understanding Diagnosis and Need for Medications  [] Improving Treatment/Medication Compliance  [] Confronting Denial of Illness  [] Stabilizing Level of Functioning  [x] Improving Emotional/Social/Cognitive Functioning  [] Decreasing Frequency of Hospitalizations    Suicidal/Homicidal ideations:   [x] Absent  [] Present  [] Passive  [] Intent  [] Plan  [] Death Wishes      CURRENT CONDITION OF PATIENT & PROGRESS ON TREATMENT PLAN    Subjective (ongoing complaints by patient regarding symptom severity, presentation, affect, function, etc.):  Kaylee Norris reports Doing well and had little to say today.  Did not want to speak to this interviewer stating everything is alright.  Very sensitive to groups.  Managing better in the groups setting overall  No aggression or violence.  Appropriately interactive and aware. Tolerating medications well.  Appetite is fair.  Eating and sleeping fairly         Behavior (side effects, changes in mental status, objective observations seen in individual sessions or by staff): Kaylee Berrios is calm cooperative, clear and coherent with speech of average rate volume and tone.  Moods are fair.  Affect is fair range. Appropriately dressed and groomed.  Denies SI/HI/AH/VH.  No aggression or violence.  Appropriately interactive and aware.  Insight is good and judgement is fair.        Assessment/Plan (functional improvement and/or ongoing impairment, response to treatment, plan for future ongoing treatment and medication management to include revisions): Daytime sleepiness with hypersomnolence better    Continue current care  Groups milieu and individual therapy  Medication modification as

## 2024-05-15 ENCOUNTER — HOSPITAL ENCOUNTER (OUTPATIENT)
Facility: HOSPITAL | Age: 24
Setting detail: RECURRING SERIES
Discharge: HOME | End: 2024-05-18
Payer: MEDICAID

## 2024-05-15 VITALS
SYSTOLIC BLOOD PRESSURE: 109 MMHG | HEART RATE: 92 BPM | OXYGEN SATURATION: 99 % | DIASTOLIC BLOOD PRESSURE: 82 MMHG | TEMPERATURE: 97.7 F

## 2024-05-15 PROCEDURE — 90853 GROUP PSYCHOTHERAPY: CPT

## 2024-05-15 PROCEDURE — 90832 PSYTX W PT 30 MINUTES: CPT

## 2024-05-15 NOTE — BH NOTE
Partial Hospitalization Program Individual Psychotherapy Note      Diagnosis: F43.10 Post traumatic stress disorder, F41.9 anxiety disorder    Goal: Individual session and review of tx plan        Psychotherapy Session    Start time: 10:50  Stop time: 11:20        Patient Mental Status and Mood/Affect:Calm and Congruent    Patient Behavior and Appearance: Cooperative and Good eye contactshows no evidence of impairment    Intervention/Techniques: Informed, Validated/Supported, Reflected, Guided, Reframed, and Promoted Peer Support    Focus of Session/Patient Response and Progress Towards Goal:     Patient actively participated and engaged with clinician to collaborate on updating treatment plan.  Patient denied SI/HI. Patient is making progress towards short term goal one and two as evidenced by self-report and staff observation. Patient reported that after sitting in groups and reflecting on skills she has completed short term goal three from the last time she was at treatment. Pt was able to identify boundary setting skills. Pt has met and completed short term goal 3 so it has been closed. A new goal has not been formulated as pt wants to focus on the first two.           Narrative:     Pt reported feeling good but tired due to cat waking her up at night. Pt stated she will put cat in crate at night to avoid him walking into room and waking her up. Patient shared feeling \" happy\" because she likes being in groups, connecting with friends, appreciative of things settling down at home, and being proud of herself for efficiently using coping skills when needed. This writer acknowledged her strengths and encouraged her to write down how she is feeling now so that when there are days she feels low which is inevitable she will be able to revist to remind herself that healing is not linear.     Patient and writer reviewed treatment plan. The pt has been making progress towards short term goal one and two. After reflecting

## 2024-05-15 NOTE — SUICIDE SAFETY PLAN
[phone]     Making the environment safe: How can I make my environment (house/apartment/living space) safer? For example, can I remove guns, medications, and other items?  1.Refill pill box every week  2. Leave home environment  3. Place safety plan in accessible spot         Bladder non-tender and non-distended. Urine clear yellow.

## 2024-05-15 NOTE — GROUP NOTE
Group Therapy Note    Date: 5/15/2024    Group Start Time:  1:10 PM  Group End Time:  2:00 PM  Group Topic: Psychoeducation    [facility]    Johan Mackey MSW        Group Therapy Note    This writer facilitated group and opened with requesting patients to complete a crystal ball ice breaker activity. Patients were encouraged to draw what they want for their future and share with peers. Following activity, patients were provided with a 5 senses meditation guide and coping skill tracker. Patients were asked to engage their five senses and write down what they see, hear, feel, touch, and taste. To conclude, patients were encouraged to fill tracker sheet and complete it at home.      Attendees: 10      Patient's Goal: to identify coping skills, to learn grounding technique, to identify goals         Notes:  Pt was present and engaged in group. Pt slightly monopolized group stating she felt goofy but was able to be redirected to focus once prompted by writer. Pt identified wanting to get good sleep in the future. Pt completed senses activity and shared it has helped her panic attacks in the past.    Status After Intervention:  Improved    Participation Level: Active Listener    Participation Quality: Appropriate      Speech:  normal      Thought Process/Content: Logical      Affective Functioning: Congruent      Mood: anxious      Level of consciousness:  Alert and Oriented x4      Response to Learning: Able to retain information, Capable of insight, and Progressing to goal      Endings: None Reported    Modes of Intervention: Education, Support, Socialization, and Exploration      Discipline Responsible: /Counselor      Signature:  MIESHA Hernandez    
                                                                      Group Therapy Note    Date: 5/15/2024    Group Start Time:  9:00 AM  Group End Time:  9:40 AM  Group Topic: Community Meeting    [facility]    Johan Mackey MSW        Group Therapy Note    This writer facilitated group and requested patients to complete check in sheets to assess safety. Patients were encouraged to jot a specific goal for the day and share with peers for accountability and encouragement. To conclude, patients were encouraged to complete thought dump exercise and identify ways to calm mind on the board when discussing those in the next group.       Attendees: 7      Patient's Goal: to assess safety, to identify coping skills, to practice thought diffusion        Notes:  Pt was present and engaged in group. Pt stepped out for 5 minutes to take medications and returned. Pt completed check in sheet and denied SI/HI. Patient completed worksheet and identified taking a brain break by stepping outside as a skill to calm mind.    Status After Intervention:  Improved    Participation Level: Active Listener    Participation Quality: Appropriate      Speech:  normal      Thought Process/Content: Logical      Affective Functioning: Congruent      Mood: anxious      Level of consciousness:  Alert and Oriented x4      Response to Learning: Progressing to goal      Endings: None Reported    Modes of Intervention: Support, Socialization, and Exploration      Discipline Responsible: /Counselor      Signature:  MIESHA Hernandez    
                                                                      Group Therapy Note    Date: 5/15/2024    Group Start Time:  9:40 AM  Group End Time: 10:30 AM  Group Topic: Process Group - Outpatient    [facility]    Carina Garcia MSW        Group Therapy Note    Attendees: 6    Writer facilitated process group this morning. Writer opened by asking if patients had anything they would like to process. A few patients did, and the discussions lasted until the end of group.        Patient's Goal:  Participate in group therapy, engage in open sharing and processing with peers.     Notes:  Patient engaged well in group this morning. She presented as intermittently drowsy, but was interactive when prompted. Patient shared she was feeling tired this morning, and was receptive to writer's encouragement of ways to address it. Patient interacted well with peers throughout, as evidenced by smiling, conversing, and laughing. Patient will continue with identified treatment goals in further groups.     Status After Intervention:  Improved    Participation Level: Active Listener and Interactive    Participation Quality: Appropriate and Attentive      Speech:  normal      Thought Process/Content: Logical      Affective Functioning: Congruent      Mood: euthymic      Level of consciousness:  Alert and Oriented x4      Response to Learning: Able to verbalize current knowledge/experience, Capable of insight, and Progressing to goal      Endings: None Reported    Modes of Intervention: Support, Socialization, and Exploration      Discipline Responsible: /Counselor      Signature:  MIESHA Perez    
                                                                      Group Therapy Note    Date: 5/15/2024    Group Start Time: 10:40 AM  Group End Time: 11:30 AM  Group Topic: Cognitive Skills    [facility]    Nayana Barrett MSW        Group Therapy Note    Writer facilitated cognitive skills group and brought in handouts on the inner critic and dangers of being overly self-critical. Writer opened with providing space for pts to finish processing if unable to do so in the prior groups. Writer transitioned to providing education on inner critic versus inner , to include characteristics, how they develop, and how they impact people. Writer encouraged peer discussion and relating education to pts' lives. Writer guided conversation to support remaining relevant to mental health. Writer provided education on radical acceptance and the benefits of that skill as part of how developing an inner  supports life quality and healthy living.    Attendees: 8       Patient's Goal:  na    Notes:  Pt was not present for majority of group due to individual with staff. Pt presented as fatigued while present.    Status After Intervention:  na    Participation Level: na    Participation Quality: na      Speech:  na      Thought Process/Content: na      Affective Functioning: na      Mood: na      Level of consciousness:  Oriented x4 and Drowsy      Response to Learning: na      Endings: None Reported    Modes of Intervention: na      Discipline Responsible: /Counselor      Signature:  MIESHA RAMÍREZ    
                                                                      Group Therapy Note    Date: 5/15/2024    Group Start Time: 12:00 PM  Group End Time:  1:00 PM  Group Topic: Relaxation    [facility]    Sal Capellan LCSW        Group Therapy Note    This writer facilitated a relaxation and coping skills practice group. The patients were encouraged to participate in a body scan meditation. The patients were encouraged to write an entry into a gratitude journal and share.       Attendees: 10       Patient's Goal:  Participate in coping activities.        Notes: The patient participated in the body scan meditation and the gratitude journaling. The patient was unable to share journal entry due to time.  The patient will continue to practice coping strategies during the relaxation group.     Status After Intervention:  Unchanged    Participation Level: Active Listener and Interactive    Participation Quality: Appropriate, Attentive, Sharing, and Supportive      Speech:  normal      Thought Process/Content: Logical      Affective Functioning: Congruent      Mood: euthymic      Level of consciousness:  Alert, Oriented x4, and Attentive      Response to Learning: Able to verbalize current knowledge/experience, Capable of insight, and Progressing to goal      Endings: None Reported    Modes of Intervention: Activity      Discipline Responsible: /Counselor      Signature:  Sal Capellan LCSW    
MIESHA Perez

## 2024-05-16 ENCOUNTER — HOSPITAL ENCOUNTER (OUTPATIENT)
Facility: HOSPITAL | Age: 24
Setting detail: RECURRING SERIES
Discharge: HOME | End: 2024-05-19
Payer: MEDICAID

## 2024-05-16 VITALS
SYSTOLIC BLOOD PRESSURE: 138 MMHG | DIASTOLIC BLOOD PRESSURE: 88 MMHG | HEART RATE: 75 BPM | TEMPERATURE: 98 F | OXYGEN SATURATION: 100 %

## 2024-05-16 PROCEDURE — 90853 GROUP PSYCHOTHERAPY: CPT

## 2024-05-16 NOTE — GROUP NOTE
Group Therapy Note    Date: 5/16/2024    Group Start Time:  9:40 AM  Group End Time: 10:30 AM  Group Topic: Process Group - Outpatient    [facility]    Nayana Barrett MSW        Group Therapy Note    Writer facilitated process group. Writer encouraged pts to share issues on which they want support with problem solving, issues they need to process with peers, or events they want to celebrate. The writer prompted and supported peer feedback. Writer provided validation and feedback as well as utilized open ended questions to support further exploration of topics.     Attendees: 6       Patient's Goal:  engaging with peer support and feedback.    Notes:  Pt presented as attentive and engaged as evidenced by spontaneous participation. Pt endorsed relating to frustration around medication voiced by peers and shared improvements she has experienced. Pt encouraged a peer who endorsed resistance to coping skills to try them and practice outside of when they are felt to be needed. Pt will continue to work on engaging with peer support and feedback.    Status After Intervention:  Unchanged    Participation Level: Active Listener and Interactive    Participation Quality: Appropriate, Attentive, Sharing, and Supportive      Speech:  normal      Thought Process/Content: Logical      Affective Functioning: Congruent      Mood: euthymic      Level of consciousness:  Alert, Oriented x4, and Attentive      Response to Learning: Able to verbalize current knowledge/experience, Capable of insight, and Progressing to goal      Endings: None Reported    Modes of Intervention: Support and Socialization      Discipline Responsible: /Counselor      Signature:  MIESHA RAMÍREZ

## 2024-05-16 NOTE — GROUP NOTE
Group Therapy Note    Date: 5/16/2024    Group Start Time:  1:10 PM  Group End Time:  2:00 PM  Group Topic: Psychoeducation    [facility]    Sal Capellan LCSW        Group Therapy Note    This writer facilitated the psycho-education group on forgiveness. The patients were provided educational materials on forgiveness and chose to share personal traumatic experiences and discuss with peers. This writer encouraged, reflected and suggested practicing a coping strategy of walking around the building at the end of the group.     Attendees: 6       Patient's Goal:  Accept information on forgiveness and participate in discussion       Notes: The patient was attentive to the topic of forgiveness. The patient participated in a discussion on trauma and forgiveness. The patient shared support for peers who have disclosed. The patient was able to stay in the group for the entire group in spite of being visibly uncomfortable. The patient participated in the end of the group walk for coping. The patient will continue to be attentive to psycho education on forgiveness.     Status After Intervention:  Unchanged    Participation Level: Active Listener and Interactive    Participation Quality: Appropriate, Attentive, and Supportive      Speech:  normal      Thought Process/Content: Logical      Affective Functioning: Congruent      Mood: euthymic      Level of consciousness:  Alert, Oriented x4, and Attentive      Response to Learning: Able to verbalize current knowledge/experience, Capable of insight, and Progressing to goal      Endings: None Reported    Modes of Intervention: Education, Support, and Activity      Discipline Responsible: /Counselor      Signature:  Sal Capellan LCSW

## 2024-05-16 NOTE — BH NOTE
OUTPATIENT PHYSICIAN PROGRESS NOTE      Chief Complaint/Symptoms/Impairments (as noted in Treatment Plan): \"I'm having some teeth grinding\"    Criteria for Continued Treatment (check all that apply):   [] Preventing Decompensation   [x] Improving Level of Functioning  [] Reducing Isolative Behaviors  [] Understanding Diagnosis and Need for Medications  [] Improving Treatment/Medication Compliance  [] Confronting Denial of Illness  [x] Stabilizing Level of Functioning  [] Improving Emotional/Social/Cognitive Functioning  [] Decreasing Frequency of Hospitalizations    Suicidal/Homicidal ideations:   [x] Absent  [] Present  [] Passive  [] Intent  [] Plan  [] Death Wishes      CURRENT CONDITION OF PATIENT & PROGRESS ON TREATMENT PLAN    Subjective (ongoing complaints by patient regarding symptom severity, presentation, affect, function, etc.): Reports increase in jaw clenching since increasing Prozac dose. Soreness in jaw, discussed potentially reducing to 20 mg daily. She reports anxiety is improving.      Behavior (side effects, changes in mental status, objective observations seen in individual sessions or by staff): Appropriately dressed, good EC. Mood \"a bit anxious\" affect full. Denies SI or HI, no evident delusions.      Assessment/Plan (functional improvement and/or ongoing impairment, response to treatment, plan for future ongoing treatment and medication management to include revisions): reduce Prozac to 20 mg daily d/t potential side effect        [x] NO NEW Medications at this time.   [] New Medication prescribed and prescription provided to patient  [] PHP Nurse notified of changes as needed    [x] Regarding any medications: patient instructed on purpose, benefits, side effects,   risks, and alternative treatments. Patient verbalizes understanding of instructions and has had the opportunity to ask questions.    Rachael Suggs MD  05/16/24   12:44 PM

## 2024-05-16 NOTE — GROUP NOTE
Group Therapy Note    Date: 5/16/2024    Group Start Time:  2:00 PM  Group End Time:  2:45 PM  Group Topic: Wrap-Up    [facility]    Nayana Barrett MSW        Group Therapy Note    Writer facilitated a community wrap up group focused on assessing for safety, safety plan review, celebration of progress, and saying goodbye to a discharging peer. Writer facilitated a symptom and safety check in using the afternoon check in form. Writer provided pts copies of their safety plans, reviewed, and encouraged pts to identify any needed changes. Writer facilitated a well wish activity for the discharging peer. Writer closed with identification of positive progress in pts. Writer reminded pts that PHP is closed tomorrow, 5/17/24.    Attendees: 6       Patient's Goal:  disclosing safety concerns and celebrating progress.      Notes:  Pt denied SI/HI on the check out form. Pt was provided a physical copy of the pt’s safety plan for review. Pt engaged appropriately in the goodbye activity and provided feedback. Pt identified 1 positive progress in her life when directly prompted. Pt will continue to work on disclosing safety concerns and celebrating progress.      Status After Intervention:  Unchanged    Participation Level: Active Listener and Interactive    Participation Quality: Appropriate, Attentive, and Sharing      Speech:  normal      Thought Process/Content: Logical      Affective Functioning: Congruent      Mood: euthymic      Level of consciousness:  Alert, Oriented x4, and Attentive      Response to Learning: Able to verbalize current knowledge/experience and Progressing to goal      Endings: None Reported    Modes of Intervention: Socialization and Activity      Discipline Responsible: /Counselor    Signature:  MIESHA RAMÍREZ

## 2024-05-16 NOTE — GROUP NOTE
Group Therapy Note    Date: 5/16/2024    Group Start Time: 12:00 PM  Group End Time:  1:00 PM  Group Topic: Psychoeducation    [facility]    Nayana Barrett MSW        Group Therapy Note    Writer facilitated creative expression group. Writer facilitated an art activity in which pts identify what they love about life. Writer facilitated processing and peer feedback through open ended reflective questions.     Attendees: 5       Patient's Goal:  identifying sources of bubba in life    Notes:  Pt presented as attentive and engaged as evidenced by participation in the activity and sharing. Pt identified several things she loves and discussed how it felt to do the activity. Pt interacted appropriately with peers. Pt will continue to work on identifying sources of bubba in life.     Status After Intervention:  Unchanged    Participation Level: Active Listener and Interactive    Participation Quality: Appropriate, Attentive, and Sharing      Speech:  normal      Thought Process/Content: Logical      Affective Functioning: Congruent      Mood: euthymic      Level of consciousness:  Alert, Oriented x4, and Attentive      Response to Learning: Able to verbalize current knowledge/experience and Progressing to goal      Endings: None Reported    Modes of Intervention: Activity      Discipline Responsible: /Counselor      Signature:  MIESHA RAMÍREZ

## 2024-05-16 NOTE — GROUP NOTE
Group Therapy Note    Date: 5/16/2024    Group Start Time: 10:40 AM  Group End Time: 11:30 AM  Group Topic: Cognitive Skills    [facility]    Nayana Barrett MSW        Group Therapy Note    Writer facilitated cognitive skills group and brought handouts on defense mechanisms. Writer opened with providing space for processing to a pt who requested it. Writer utilized reflective listening, validation, therapeutic use of silence, and open ended questions to support the pt in sharing as well as peer discussion. Writer provided education on coping skills for managing mood.    Attendees: 6       Patient's Goal:  engaging in peer feedback and identifying healthy coping skills.    Notes:  Pt presented as attentive and engaged as evidenced by spontaneous participation. Pt reported having a headache though demonstrated ongoing engagement. Pt provided appropriate and supportive feedback to a peer regarding patience with incremental progress. Pt will continue to work on engaging in peer feedback and identifying healthy coping skills.    Status After Intervention:  Unchanged    Participation Level: Active Listener and Interactive    Participation Quality: Appropriate, Attentive, and Supportive      Speech:  normal      Thought Process/Content: Logical      Affective Functioning: Congruent      Mood: euthymic      Level of consciousness:  Alert, Oriented x4, and Attentive      Response to Learning: Able to verbalize current knowledge/experience and Progressing to goal      Endings: None Reported    Modes of Intervention: Support and Problem-solving      Discipline Responsible: /Counselor      Signature:  MIESHA RAMÍREZ

## 2024-05-16 NOTE — GROUP NOTE
Group Therapy Note    Date: 5/16/2024    Group Start Time:  9:00 AM  Group End Time:  9:40 AM  Group Topic: Community Meeting    [facility]    Sal Capellan LCSW        Group Therapy Note    The patients were encouraged to complete the morning assessment. The patients were encouraged to discuss events since the last time they were in group. The patients were encouraged to identify goals and steps they can take today work towards that goal.      Attendees: 6       Patient's Goal: Identify mood and self-disclose.         Notes: The patient completed the morning assessment and denied SI/HI. The patient shared about events since the last group and discussed with peers. The patient was supportive of peer who disclosed and asked clarifying questions of peers.  The patient will continue to self-disclose and identify mood in the community group.     Status After Intervention:  Unchanged    Participation Level: Active Listener and Interactive    Participation Quality: Appropriate, Attentive, Sharing, and Supportive      Speech:  normal      Thought Process/Content: Logical      Affective Functioning: Congruent      Mood: euthymic      Level of consciousness:  Alert, Oriented x4, and Attentive      Response to Learning: Able to verbalize current knowledge/experience, Capable of insight, and Progressing to goal      Endings: None Reported    Modes of Intervention: Support, Socialization, and Activity      Discipline Responsible: /Counselor      Signature:  Sal Capellan LCSW

## 2024-05-20 ENCOUNTER — HOSPITAL ENCOUNTER (OUTPATIENT)
Facility: HOSPITAL | Age: 24
Setting detail: RECURRING SERIES
Discharge: HOME | End: 2024-05-23
Payer: MEDICAID

## 2024-05-20 VITALS
OXYGEN SATURATION: 100 % | SYSTOLIC BLOOD PRESSURE: 127 MMHG | HEART RATE: 79 BPM | TEMPERATURE: 98 F | DIASTOLIC BLOOD PRESSURE: 91 MMHG

## 2024-05-20 PROCEDURE — 90853 GROUP PSYCHOTHERAPY: CPT

## 2024-05-20 RX ORDER — FLUOXETINE 20 MG/1
20 TABLET, FILM COATED ORAL DAILY
Qty: 30 TABLET | Refills: 0 | Status: SHIPPED | OUTPATIENT
Start: 2024-05-20

## 2024-05-20 NOTE — GROUP NOTE
Group Therapy Note    Date: 5/20/2024    Group Start Time:  9:43 AM  Group End Time: 10:40 AM  Group Topic: Process Group - Outpatient    [facility]    Nayana Barrett MSW        Group Therapy Note    Writer facilitated process group. Writer encouraged pts to share about current stressors or sources for celebration to obtain peer feedback and support. Writer utilized open ended questions and reflective statements to encourage peer feedback and discussion.     Attendees: 10       Patient's Goal:  engaging in peer feedback and support.     Notes:  Pt presented as attentive and engaged as evidenced by spontaneous participation. Pt endorsed relating to a peer regarding difficulty with socializing with new people. Pt encouraged peers to remember that practice makes socializing easier and that awkward silences do not always need filling. Pt shared an appropriate story about communication. Pt will continue to work on engaging in peer feedback and support.     Status After Intervention:  Unchanged    Participation Level: Active Listener and Interactive    Participation Quality: Appropriate, Attentive, and Sharing      Speech:  normal      Thought Process/Content: Logical      Affective Functioning: Congruent      Mood: anxious      Level of consciousness:  Alert, Oriented x4, and Attentive      Response to Learning: Able to verbalize current knowledge/experience and Progressing to goal      Endings: None Reported    Modes of Intervention: Support and Socialization      Discipline Responsible: /Counselor      Signature:  MIESHA RAMÍREZ

## 2024-05-20 NOTE — BH NOTE
OUTPATIENT PHYSICIAN PROGRESS NOTE      Chief Complaint/Symptoms/Impairments (as noted in Treatment Plan): DIA, PTSD    Criteria for Continued Treatment (check all that apply):   [] Preventing Decompensation   [] Improving Level of Functioning  [x] Reducing Isolative Behaviors  [] Understanding Diagnosis and Need for Medications  [] Improving Treatment/Medication Compliance  [] Confronting Denial of Illness  [] Stabilizing Level of Functioning  [x] Improving Emotional/Social/Cognitive Functioning  [] Decreasing Frequency of Hospitalizations    Suicidal/Homicidal ideations:   [x] Absent  [] Present  [] Passive  [] Intent  [] Plan  [] Death Wishes      CURRENT CONDITION OF PATIENT & PROGRESS ON TREATMENT PLAN    Subjective (ongoing complaints by patient regarding symptom severity, presentation, affect, function, etc.):  Kaylee Norris reports doing well over the weekend and had a positive disposition today.  Managing well in the groups setting overall.  No aggression or violence.  Appropriately interactive and aware.  Ran out of her Prozac.  Tolerating medications well.  Appetite is fair.  Eating and sleeping fairly         Behavior (side effects, changes in mental status, objective observations seen in individual sessions or by staff): Kaylee Berrios is calm cooperative, clear and coherent with speech of average rate volume and tone.  Moods are fair.  Affect is fair range. Appropriately dressed and groomed.  Denies SI/HI/AH/VH.  No aggression or violence.  Appropriately interactive and aware.  Insight is good and judgement is fair.        Assessment/Plan (functional improvement and/or ongoing impairment, response to treatment, plan for future ongoing treatment and medication management to include revisions): Daytime sleepiness with hypersomnolence better    Continue current care  Groups milieu and individual therapy  Medication modification as appropriate  Renew medications as appropriate  Disposition planning

## 2024-05-20 NOTE — GROUP NOTE
Group Therapy Note    Date: 5/20/2024    Group Start Time:  2:00 PM  Group End Time:  2:45 PM  Group Topic: Wrap-Up    [facility]    Nayana Barrett MSW        Group Therapy Note    Writer facilitated a community wrap up group focused on assessing for safety, coping skills practice, and group rapport building. Writer facilitated a symptom and safety check in using the afternoon check in form. Writer facilitated a body scan meditation and support processing afterwards. Writer closed with a rapport game in which pts anonymously shared facts about themselves, which the peers then had to guess whose facts were whose as a way of bonding and getting to know one another.    Attendees: 5       Patient's Goal:   disclosing safety concerns, practicing coping skills, and building rapport with the group.     Notes:   Pt denied SI/HI on the check out form. Pt was observed to participate in the meditation and identified it as difficult for her. Pt endorsed and was observed to have difficulty staying still. Pt engaged actively and appropriately in the rapport activity. Pt asked peers questions and shared about relating to their experiences. Pt will continue to work on disclosing safety concerns, practicing coping skills, and building rapport with the group.     Status After Intervention:  Unchanged    Participation Level: Active Listener and Interactive    Participation Quality: Appropriate, Attentive, and Sharing      Speech:  normal      Thought Process/Content: Logical      Affective Functioning: Congruent      Mood: anxious      Level of consciousness:  Alert, Oriented x4, and Attentive      Response to Learning: Able to verbalize current knowledge/experience and Progressing to goal      Endings: None Reported    Modes of Intervention: Socialization and Activity      Discipline Responsible: /Counselor      Signature:  MIESHA RAMÍREZ

## 2024-05-20 NOTE — GROUP NOTE
Group Therapy Note    Date: 5/20/2024    Group Start Time: 10:40 AM  Group End Time: 11:30 AM  Group Topic: Cognitive Skills    [facility]    Sal Capellan LCSW        Group Therapy Note    The patients were encouraged to participate in an ice breaker exercise to welcome a new peer. The patients were prompted to discuss expectations vs experience in the PHP group as well as answer an ice breaker question. The patients were encouraged to discuss forgiveness and apologizing.       Attendees: 9       Patient's Goal:  Welcome new peer and discuss forgiveness       Notes: The patient participated in the ice breaker exercise to welcome a new peer. The patient participated in discussion on forgiveness and apologizing with peers. The patient was attentive to discussion on forgiveness  The patient will continue to welcome peers and participate in discussion during the cognitive skills group.     Status After Intervention:  Unchanged    Participation Level: Active Listener and Interactive    Participation Quality: Appropriate, Attentive, Sharing, and Supportive      Speech:  normal      Thought Process/Content: Logical      Affective Functioning: Congruent      Mood: euthymic      Level of consciousness:  Alert, Oriented x4, and Attentive      Response to Learning: Able to verbalize current knowledge/experience, Capable of insight, and Progressing to goal      Endings: None Reported    Modes of Intervention: Education and Support      Discipline Responsible: /Counselor      Signature:  Sal Capellan LCSW

## 2024-05-20 NOTE — BH NOTE
0845 Pt's BP was flagged as abnormal due to being 127/91. Pt denied physical symptoms and reported only drinking coffee thus far in the morning. Writer to follow up with Dr Nieto.    1320 Writer spoke to Dr Nieto via telephone and reported pt's vitals, lack of endorsed physical symptoms, and reported caffeine intake at time of vitals as well as pt's later report in groups of smoking cigarettes every morning before PHP. Dr Nieto stated no need for medical evaluation.    MIESHA Daley

## 2024-05-20 NOTE — GROUP NOTE
Group Therapy Note    Date: 5/20/2024    Group Start Time:  1:10 PM  Group End Time:  2:00 PM  Group Topic: Psychoeducation    [facility]    Sal Capellan LCSW        Group Therapy Note    This writer facilitated the psycho-education group. The patient were presented with educational material on inner-critic and how to challenge their inner critic. The patient were encouraged to be attentive during presentation of materials and to participate in challenging their inner critic activity in the group.     Attendees: 5       Patient's Goal: Accept information in inner critic and participate in challenging inner critic exercise.        Notes: The patient was attentive to information on inner critic. The patient completed the challenging inner critic exercise. The patient shared that her inner critic tells her she is worthless and shared a anecdote about feeling like she was not doing enough at her last reenactment event.  The patient will continue to be attentive to education materials and participate in group activities.     Status After Intervention:  Unchanged    Participation Level: Active Listener and Interactive    Participation Quality: Appropriate, Attentive, Sharing, and Supportive      Speech:  normal      Thought Process/Content: Logical      Affective Functioning: Congruent      Mood: euthymic      Level of consciousness:  Alert, Oriented x4, and Attentive      Response to Learning: Able to verbalize current knowledge/experience, Able to retain information, and Progressing to goal      Endings: None Reported    Modes of Intervention: Education, Exploration, and Activity      Discipline Responsible: /Counselor      Signature:  Sal Capellan LCSW

## 2024-05-20 NOTE — GROUP NOTE
Group Therapy Note    Date: 5/20/2024    Group Start Time:  9:00 AM  Group End Time:  9:40 AM  Group Topic: Community Meeting    [facility]    Carina Garcia MSW        Group Therapy Note    Attendees: 9    Writer facilitated community check in group this morning. Writer opened by asking patients to fill out their check in sheets. Writer then asked patients how they were doing and what they had done over the weekend. Writer noted that group members were quiet, and asked if they had anything they'd like to process. Writer facilitated discussions around processing until the end of group.        Patient's Goal:  Participate in group therapy, complete check in sheet, engage in open sharing with peers.     Notes:  Patient engaged well in group this morning. She filled out her check in sheet, declining any SI or thoughts of self harm. Patient endorsed low depression and anger and higher anxiety. She provided good verbal support for a peer who was sharing about social anxiety. Patient shared that she had been house sitting over the weekend and it was a positive experience. Patient will continue with identified treatment goals in further groups.     Status After Intervention:  Improved    Participation Level: Active Listener and Interactive    Participation Quality: Appropriate, Attentive, and Sharing      Speech:  normal      Thought Process/Content: Logical      Affective Functioning: Congruent      Mood: euthymic      Level of consciousness:  Alert and Oriented x4      Response to Learning: Able to verbalize current knowledge/experience, Capable of insight, and Progressing to goal      Endings: None Reported    Modes of Intervention: Support, Socialization, and Exploration      Discipline Responsible: /Counselor      Signature:  MIESHA Perez

## 2024-05-20 NOTE — GROUP NOTE
Group Therapy Note    Date: 5/20/2024    Group Start Time: 12:00 PM  Group End Time:  1:00 PM  Group Topic: Relapse Prevention    [facility]    Nayana Barrett MSW        Group Therapy Note    Writer facilitated relapse prevention group with a focus on urges. Writer prompted silent journaling on pt's urges and associated feelings and thoughts on those urges. Writer facilitated peer discussion and feedback, to include coping skill identification.    Attendees: 5       Patient's Goal:  understanding and managing urges.    Notes:  Pt presented as attentive and engaged as evidenced by spontaneous participation. Pt presented with nervous energy and presented as receptive to redirection to focus on journaling. Pt shared about her addiction to nicotine and attempts to quit. Pt presented as receptive to feedback. Pt will continue to work on understanding and managing urges.    Status After Intervention:  Unchanged    Participation Level: Active Listener and Interactive    Participation Quality: Appropriate, Attentive, and Sharing      Speech:  normal      Thought Process/Content: Logical      Affective Functioning: Congruent      Mood: anxious      Level of consciousness:  Alert and Oriented x4      Response to Learning: Able to verbalize current knowledge/experience, Able to change behavior, and Progressing to goal      Endings: None Reported    Modes of Intervention: Education and Support      Discipline Responsible: /Counselor      Signature:  MIESHA RAMÍREZ

## 2024-05-21 ENCOUNTER — HOSPITAL ENCOUNTER (OUTPATIENT)
Facility: HOSPITAL | Age: 24
Setting detail: RECURRING SERIES
Discharge: HOME | End: 2024-05-24
Payer: MEDICAID

## 2024-05-21 VITALS
DIASTOLIC BLOOD PRESSURE: 91 MMHG | TEMPERATURE: 98.5 F | SYSTOLIC BLOOD PRESSURE: 116 MMHG | HEART RATE: 83 BPM | OXYGEN SATURATION: 100 %

## 2024-05-21 PROCEDURE — 90853 GROUP PSYCHOTHERAPY: CPT

## 2024-05-21 NOTE — GROUP NOTE
Group Therapy Note    Date: 5/21/2024    Group Start Time: 10:40 AM  Group End Time: 11:30 AM  Group Topic: Cognitive Skills    [facility]    Sal Capellan LCSW        Group Therapy Note    The patients were provided educational materials on improving wellness and encouraged to discuss what they do for their wellness with peers    Attendees: 6       Patient's Goal: be open to educational materials on wellness and discussion with peers.        Notes: The patient was open to educational materials on improving wellness and discussed their current habits for wellness with peers. The patient shared that she is building up to share personal history, but was not ready to as of yet. The patient accepted support from peers. The patient will continue to be open to educational materials and participate in group discussion.     Status After Intervention:  Unchanged    Participation Level: Active Listener and Interactive    Participation Quality: Appropriate, Attentive, Sharing, and Supportive      Speech:  normal      Thought Process/Content: Logical      Affective Functioning: Congruent      Mood: euthymic      Level of consciousness:  Alert, Oriented x4, and Attentive      Response to Learning: Able to verbalize current knowledge/experience, Capable of insight, and Progressing to goal      Endings: None Reported    Modes of Intervention: Education, Support, and Socialization      Discipline Responsible: /Counselor      Signature:  Sal Capellan LCSW

## 2024-05-21 NOTE — GROUP NOTE
Group Therapy Note    Date: 5/21/2024    Group Start Time:  2:00 PM  Group End Time:  2:45 PM  Group Topic: Wrap-Up    [facility]    Nayana Barrett MSW        Group Therapy Note    Writer facilitated a community wrap up group focused on assessing for safety, announcements, and processing. Writer facilitated a symptom and safety check in using the afternoon check in form. Writer informed pts of the closure on 5/27/2024. Writer discussed changes with staffing and program activities with pts and supported processing.    Attendees: 6       Patient's Goal:  disclosing safety concerns and processing upcoming events.    Notes:  Pt presented as attentive and engaged as evidenced by spontaneous participation. Pt denied SI/HI on the wrap up. Pt interacted appropriately with peers. Pt identified feelings of distress around staff leaving to cover other locations. Pt shared about her experience with breaking the code of conduct rule around meeting with people from [facility] outside of treatment times, leading to her discharge her previous time at [facility]. Pt encouraged peers to follow this rule and presented as receptive to feedback. Pt will continue to work on disclosing safety concerns and processing upcoming events.    Status After Intervention:  Unchanged    Participation Level: Active Listener and Interactive    Participation Quality: Appropriate, Attentive, and Sharing      Speech:  normal      Thought Process/Content: Logical      Affective Functioning: Congruent      Mood: anxious      Level of consciousness:  Alert, Oriented x4, and Attentive      Response to Learning: Able to verbalize current knowledge/experience and Progressing to goal      Endings: None Reported    Modes of Intervention: Support and Socialization      Discipline Responsible: /Counselor      Signature:  MIESHA RAMÍREZ

## 2024-05-21 NOTE — BH NOTE
OUTPATIENT PHYSICIAN PROGRESS NOTE      Chief Complaint/Symptoms/Impairments (as noted in Treatment Plan): \"I lowered the prozac but I'm still clenching my teeth\"    Criteria for Continued Treatment (check all that apply):   [x] Preventing Decompensation   [] Improving Level of Functioning  [] Reducing Isolative Behaviors  [] Understanding Diagnosis and Need for Medications  [x] Improving Treatment/Medication Compliance  [] Confronting Denial of Illness  [] Stabilizing Level of Functioning  [] Improving Emotional/Social/Cognitive Functioning  [] Decreasing Frequency of Hospitalizations    Suicidal/Homicidal ideations:   [x] Absent  [] Present  [] Passive  [] Intent  [] Plan  [] Death Wishes      CURRENT CONDITION OF PATIENT & PROGRESS ON TREATMENT PLAN    Subjective (ongoing complaints by patient regarding symptom severity, presentation, affect, function, etc.): Reports continued jaw clenching. Needing less lorazepam and hydroxyzine.      Behavior (side effects, changes in mental status, objective observations seen in individual sessions or by staff): Casually dressed and groomed. EC fair. Speech wnl. Mood \"pretty good\" affect congruent. TP linear and logical. No evident hallucinatons/delusions.      Assessment/Plan (functional improvement and/or ongoing impairment, response to treatment, plan for future ongoing treatment and medication management to include revisions): continue current treatment        [x] NO NEW Medications at this time.   [] New Medication prescribed and prescription provided to patient  [] PHP Nurse notified of changes as needed    [x] Regarding any medications: patient instructed on purpose, benefits, side effects,   risks, and alternative treatments. Patient verbalizes understanding of instructions and has had the opportunity to ask questions.    Rachael Suggs MD  05/21/24   9:36 AM

## 2024-05-21 NOTE — GROUP NOTE
Group Therapy Note    Date: 5/21/2024    Group Start Time:  9:40 AM  Group End Time: 10:30 AM  Group Topic: Process Group - Outpatient    [facility]    Nayana Barrett MSW        Group Therapy Note    Writer facilitated process group. Writer encouraged pts to share about stressors or sources for bbuba. Writer encouraged peer feedback and discussion using open ended questions, reflective statements, and direct prompting. Writer provided feedback and education on avoidance cycles.    Attendees: 6       Patient's Goal:  engaging in peer feedback and support.    Notes:   Pt presented as attentive and engaged as evidenced by spontaneous participation. Pt provided supportive feedback to peers. Pt asked appropriate questions of peers to support understanding and better feedback. Pt will continue to work on engaging in peer feedback and support.    Status After Intervention:  Unchanged    Participation Level: Active Listener and Interactive    Participation Quality: Appropriate, Attentive, and Supportive      Speech:  normal      Thought Process/Content: Logical      Affective Functioning: Congruent      Mood: euthymic      Level of consciousness:  Alert, Oriented x4, and Attentive      Response to Learning: Able to verbalize current knowledge/experience and Progressing to goal      Endings: None Reported    Modes of Intervention: Support and Socialization      Discipline Responsible: /Counselor      Signature:  MIESHA RAMÍREZ

## 2024-05-21 NOTE — GROUP NOTE
Group Therapy Note    Date: 5/21/2024    Group Start Time:  1:10 PM  Group End Time:  2:00 PM  Group Topic: Psychoeducation    [facility]    Sal Capellan LCSW        Group Therapy Note    This writer presented educational materials on healthy sleep hygiene. This writer encouraged the patients to commit to trying one of the healthy sleep hygiene practices presented during group that they are not already doing tonight.     Attendees: 6       Patient's Goal: accept educational me trials and discuss improvement to sleep hygiene.        Notes: The patient was attentive during presentation of educational materials. The patient participated in a discussion of personal sleep hygiene. The patient shared that she tries to exercise regularly and take her medication as prescribed to have healthy sleep habits.  The patient will continue to be open to educational materials during the Psychoeducational group.     Status After Intervention:  Unchanged    Participation Level: Active Listener and Interactive    Participation Quality: Appropriate, Attentive, Sharing, and Supportive      Speech:  normal      Thought Process/Content: Logical      Affective Functioning: Congruent      Mood: anxious      Level of consciousness:  Alert, Oriented x4, and Attentive      Response to Learning: Able to verbalize current knowledge/experience, Capable of insight, and Progressing to goal      Endings: None Reported    Modes of Intervention: Education and Exploration      Discipline Responsible: /Counselor      Signature:  Sal Capellan LCSW

## 2024-05-21 NOTE — GROUP NOTE
Group Therapy Note    Date: 5/21/2024    Group Start Time:  9:00 AM  Group End Time:  9:40 AM  Group Topic: Community Meeting    [facility]    Sal Capellan LCSW        Group Therapy Note    This writer facilitated the community group. The patients were encouraged to completed the morning assessment to identify mood and not any safety concerns. The patients were encouraged to share an affirmation and goals for the day with peers. The patients were encouraged to work on a \"letter to yourself\" exercise and imagine themselves and the changes they have made at the end of treatment.     Attendees: 6       Patient's Goal:  Identify mood and set goal for the day       Notes: The patient completed the morning assessment and denied SI/HI. The patient participated in a discussion of the previous evening with peers. The patient participated in goal setting exercise for the day. The patient shared that her goal is to address the physical symptoms of anxiety and the patient took her anxiety medication at the start of the group.  The patient will continue to identify mood and set goal in the community group.     Status After Intervention:  Unchanged    Participation Level: Active Listener and Interactive    Participation Quality: Appropriate, Attentive, Sharing, and Supportive      Speech:  normal      Thought Process/Content: Logical      Affective Functioning: Congruent      Mood: euthymic      Level of consciousness:  Alert, Oriented x4, and Attentive      Response to Learning: Able to verbalize current knowledge/experience      Endings: None Reported    Modes of Intervention: Activity      Discipline Responsible: /Counselor      Signature:  Sal Capellan LCSW

## 2024-05-21 NOTE — GROUP NOTE
Group Therapy Note    Date: 5/21/2024    Group Start Time: 12:00 PM  Group End Time:  1:00 PM  Group Topic: Psychoeducation    [facility]    Nayana Barrett MSW        Group Therapy Note    Writer facilitated psychoeducation group focused on healthy communication. Writer provided handouts and education on fair fighting rules. Writer encouraged pts to ask questions and relate the information to their lived experiences. Writer encouraged peer feedback.    Attendees: 5       Patient's Goal:  learning and applying fair fighting rules    Notes:  Pt presented as attentive and engaged as evidenced by spontaneous participation. Pt was prompted by a peer to share about something the pt had requested to share in the previous group, however pt expressed reluctance. Pt discussed unhealthy patterns of communication in her relationship with her father. Pt stepped out briefly to regulate and returned within 3 minutes. Pt will continue to work on learning and applying fair fighting rules.    Status After Intervention:  Unchanged    Participation Level: Active Listener and Interactive    Participation Quality: Appropriate, Attentive, and Sharing      Speech:  normal      Thought Process/Content: Logical      Affective Functioning: Congruent      Mood: anxious      Level of consciousness:  Alert, Oriented x4, and Attentive      Response to Learning: Able to verbalize current knowledge/experience, Capable of insight, and Progressing to goal      Endings: None Reported    Modes of Intervention: Education and Support      Discipline Responsible: /Counselor      Signature:  MIESHA RAMÍREZ

## 2024-05-22 ENCOUNTER — HOSPITAL ENCOUNTER (OUTPATIENT)
Facility: HOSPITAL | Age: 24
Setting detail: RECURRING SERIES
Discharge: HOME | End: 2024-05-25
Payer: MEDICAID

## 2024-05-22 VITALS
TEMPERATURE: 98.2 F | DIASTOLIC BLOOD PRESSURE: 87 MMHG | SYSTOLIC BLOOD PRESSURE: 123 MMHG | OXYGEN SATURATION: 98 % | HEART RATE: 85 BPM

## 2024-05-22 PROCEDURE — 90832 PSYTX W PT 30 MINUTES: CPT

## 2024-05-22 PROCEDURE — 90853 GROUP PSYCHOTHERAPY: CPT

## 2024-05-22 NOTE — GROUP NOTE
Group Therapy Note    Date: 5/22/2024    Group Start Time:  9:45 AM  Group End Time: 10:30 AM  Group Topic: Process Group - Outpatient    [facility]    Nayana Barrett MSW        Group Therapy Note    Writer facilitated process group. Writer encouraged pts to share about current stressors or sources for celebration/bubba. Writer encouraged peer feedback through therapeutic use of silence, prompting, and reflective statements. Writer provided education on anxiety regulation skills based on statements from peers. Writer provided education on the therapist's role in supporting safety based on a joke shared by a peer.    Attendees: 6       Patient's Goal:  engaging with peer support and feedback as well as learning anxiety coping skills.     Notes:  Pt presented as attentive and engaged as evidenced by spontaneous participation. Pt endorsed relating to a peer regarding difficulty accepting positive feedback from others due to self-esteem issues. Pt also endorsed relating to and validated a peer sharing about chronic feelings of not being safe. Pt provided appropriate feedback and identified healthy coping skills. Pt will continue to work on engaging with peer support and feedback as well as learning anxiety coping skills.     Status After Intervention:  Unchanged    Participation Level: Active Listener and Interactive    Participation Quality: Appropriate, Attentive, Sharing, and Supportive      Speech:  normal      Thought Process/Content: Logical      Affective Functioning: Congruent      Mood: anxious      Level of consciousness:  Alert, Oriented x4, and Attentive      Response to Learning: Able to verbalize current knowledge/experience and Progressing to goal      Endings: None Reported    Modes of Intervention: Education and Support      Discipline Responsible: /Counselor      Signature:  MIESHA RAMÍREZ

## 2024-05-22 NOTE — GROUP NOTE
Group Therapy Note    Date: 5/22/2024    Group Start Time:  1:10 PM  Group End Time:  2:00 PM  Group Topic: Psychoeducation    [facility]    Tala Tanner MSW        Group Therapy Note    Patients were given educational materials about domestic violence and the power and control wheel. Patients were encouraged to identify elements of healthy relationships, what they look like, and how they feel. Patients were encouraged to disclose about personal experiences with relationships.    Attendees: 4       Patient's Goal:  identify qualities of healthy relationships, reflect on experiences and emotions    Notes:  The patient left group before discussion started and met individually with therapist for duration of group.    Status After Intervention:  N/A    Participation Level: N/A    Participation Quality: N/A      Speech:  normal      Thought Process/Content: Logical  Linear      Affective Functioning: Congruent      Mood: anxious, dysphoric, and euthymic      Level of consciousness:  Alert and Oriented x4      Response to Learning: N/A      Endings: N/A    Modes of Intervention: N/A      Discipline Responsible: /Counselor      Signature:  MIESHA Garrison

## 2024-05-22 NOTE — GROUP NOTE
Group Therapy Note    Date: 5/22/2024    Group Start Time: 10:40 AM  Group End Time: 11:30 AM  Group Topic: Cognitive Skills    [facility]    Carina Garcia MSW        Group Therapy Note    Attendees: 6    Writer facilitated cognitive skills group this morning. Writer opened by asking if any of the patients had anything left to process after the last group. Two patients did, and processing/discussions lasted most of the group. In order to bring in a skill for patients to practice, writer accompanied patients on a walk to regulate for the remainder of group.        Patient's Goal:  Participate in group therapy, engage in open sharing with and support of peers, practice movement as a regulation skill.     Notes:  Patient engaged well in group this morning. She provided good support for a peer who was processing a difficult evening with increased depression symptoms. Patient shared her own experience of feeling hopeless at times, and how she has lived for her friends in the past in order to get through. She enthusiastically engaged in the walk. Patient will continue with identified treatment goals in further groups.     Status After Intervention:  Improved    Participation Level: Active Listener and Interactive    Participation Quality: Appropriate, Attentive, Sharing, and Supportive      Speech:  normal      Thought Process/Content: Logical      Affective Functioning: Congruent      Mood: euthymic      Level of consciousness:  Alert and Oriented x4      Response to Learning: Able to verbalize current knowledge/experience, Capable of insight, and Progressing to goal      Endings: None Reported    Modes of Intervention: Education, Support, Socialization, and Exploration      Discipline Responsible: /Counselor      Signature:  MIESHA Perez

## 2024-05-22 NOTE — GROUP NOTE
Group Therapy Note    Date: 5/22/2024    Group Start Time:  9:00 AM  Group End Time:  9:40 AM  Group Topic: Community Meeting    [facility]    Johan Mackey MSW        Group Therapy Note    This writer facilitated group and started with requesting patients to complete check in sheets to assess safety. Patients were encouraged to identify and share with their peers goal for the day to help with accountability. To conclude, this writer encouraged patients to write a postcard from their inner critic to their inner .       Attendees: 6        Patient's Goal: to assess safety, to identify goal, to process emotions       Notes:  Pt was present and engaged in group. Pt completed check in sheet and denied SI/HI. Patient shared goal of the day was to process her dead friend. Pt completed activity and shared out loud. Pt was supportive towards peers and will continue to work towards goal.    Status After Intervention:  Improved    Participation Level: Active Listener    Participation Quality: Appropriate      Speech:  normal      Thought Process/Content: Logical      Affective Functioning: Congruent      Mood: euthymic      Level of consciousness:  Alert and Oriented x4      Response to Learning: Capable of insight and Progressing to goal      Endings: None Reported    Modes of Intervention: Support, Socialization, and Exploration      Discipline Responsible: /Counselor      Signature:  MIESHA Hernandez

## 2024-05-22 NOTE — GROUP NOTE
Group Therapy Note    Date: 2024    Group Start Time: 12:00 PM  Group End Time:  1:00 PM  Group Topic: Psychoeducation    [facility]    Nayana Barrett MSW        Group Therapy Note    Writer facilitated trauma group focused on processing and challenging unhealthy self-blame related to trauma. Writer instructed pts to anonymously write down something for which they blame themselves. Writer then read aloud the anonymous submissions and supported pts in challenging the unhealthy self-blame. Writer encouraged peer feedback and support when peers volunteered to identify when their submission was read.    Attendees: 4       Patient's Goal:  processing and challenging unhealthy self-blame    Notes:  Pt presented as attentive and engaged as evidenced by spontaneous participation. Pt reported she had something specific she was already planning to ask to process with the group that was coincidentally related to the topic. Pt shared about blaming herself for the death of a friend, who  in a car crash 1 year after the pt ended their friendship. Pt reported she had not talked about the friend's death or the circumstances of their friendship to anyone before. Pt presented as tearful and left the room. Pt regulated and returned and shared further. Pt presented as receptive to peers expressing pride in her for sharing as well as gentle challenging. Pt presented as tearful again and left for the final 5 minutes of  group. Pt will continue to work on processing and challenging unhealthy self-blame.     Status After Intervention:  Unchanged    Participation Level: Active Listener and Interactive    Participation Quality: Appropriate, Attentive, and Sharing      Speech:  normal      Thought Process/Content: Logical      Affective Functioning: Congruent      Mood:  sad, distressed, anxious      Level of consciousness:  Alert, Oriented x4, and

## 2024-05-22 NOTE — BH NOTE
Partial Hospitalization Program Individual Psychotherapy Note      Diagnosis: F43.10 Post traumatic stress disorder, F41.9 anxiety disorder     Goal: Individual session and review of treatment plan        Psychotherapy Session    Start time: 1:10  Stop time: 1:45        Patient Mental Status and Mood/Affect:Calm and Congruent    Patient Behavior and Appearance: Cooperativeshows no evidence of impairment    Intervention/Techniques: Informed, Validated/Supported, Reflected, Guided, Challenged, Reframed, and Promoted Peer Support    Focus of Session/Patient Response and Progress Towards Goal:     Patient actively participated and engaged with clinician to collaborate on updating treatment plan.  Patient denied SI/HI. Patient is making progress towards short term goal one and two as evidenced by self-report and staff observation. Pt in collaboration with writer formulated new goal which has not been met.    Narrative:     Patient reported feeling \" calm and stable\"  but not engaging in things outside of PHP that make her happy. She shared feeling proud of herself for practicing coping skills this past week for anxiety and opening up more in group. Pt stated feeling more depressed than anxious now. Pt shared going into her room after PHP and being on her phone instead of engaging in self-care. This writer validated pt's struggle and encouraged her to reflect on how she can add this to tx plan. Pt was agreeable to formulate new goal to address her depression.       This writer informed patient she will be reassigned to [facility] starting next week. Pt reflected on the transition and was receptive to warm handoff.     Patient and writer reviewed treatment plan. The pt has been making progress towards short term goals 1 and two. Overall, the patient has been engaged in groups, individuals, and medication management.      Short term goal #1: Pt reports noticing the meds are helping her sit still better and not \"

## 2024-05-22 NOTE — GROUP NOTE
Group Therapy Note    Date: 5/22/2024    Group Start Time:  2:00 PM  Group End Time:  2:45 PM  Group Topic: Process Group - Outpatient    [facility]    Nayana Barrett MSW        Group Therapy Note    Writer facilitated a community wrap up group focused on assessing for safety, coping skills practice, and practicing identifying emotions. Writer facilitated a symptom and safety check in using the afternoon check in form. Writer facilitated coping skills practice and emotion identification using the art activity for identifying where in their bodies pts are feeling different emotions. Writer supported sharing and processing through gentle redirection, questions, prompting, and validation.    Attendees: 4       Patient's Goal:  disclosing safety concerns, practicing coping skills, and identifying emotions.     Notes:  Pt denied SI/HI on the check out form. Pt presented as anxious as evidenced by difficulty staying still and expressed anxiety. Pt presented as receptive to redirection. Pt volunteered to share her response to the activity and answered questions appropriately. Pt will continue to work on disclosing safety concerns, practicing coping skills, and identifying emotions.     Status After Intervention:  Unchanged    Participation Level: Active Listener and Interactive    Participation Quality: Appropriate, Attentive, and Sharing      Speech:  normal      Thought Process/Content: Logical      Affective Functioning: Congruent      Mood: anxious      Level of consciousness:  Alert, Oriented x4, and Attentive      Response to Learning: Able to verbalize current knowledge/experience and Progressing to goal      Endings: None Reported    Modes of Intervention: Activity      Discipline Responsible: /Counselor      Signature:  MIESHA RAMÍREZ

## 2024-05-23 ENCOUNTER — HOSPITAL ENCOUNTER (OUTPATIENT)
Facility: HOSPITAL | Age: 24
Setting detail: RECURRING SERIES
Discharge: HOME | End: 2024-05-26
Payer: MEDICAID

## 2024-05-23 VITALS
TEMPERATURE: 98.1 F | OXYGEN SATURATION: 100 % | HEART RATE: 82 BPM | DIASTOLIC BLOOD PRESSURE: 95 MMHG | SYSTOLIC BLOOD PRESSURE: 123 MMHG

## 2024-05-23 PROCEDURE — 90853 GROUP PSYCHOTHERAPY: CPT

## 2024-05-23 NOTE — GROUP NOTE
Group Therapy Note    Date: 5/23/2024    Group Start Time:  2:00 PM  Group End Time:  2:45 PM  Group Topic: Wrap-Up    [facility]    Nayana Barrett MSW        Group Therapy Note     Writer facilitated a community wrap up group focused on assessing for safety, coping skills practice, and planning ways to engage in healthy coping while in the community. Writer facilitated a symptom and safety check in using the afternoon check in form. Writer facilitated a self-love meditation and encouraged processing after. Writer prompted pts to develop a schedule of healthy activities for Monday when PHP is closed due to multiple pts expressing anxiety about not having PHP that day.    Attendees: 5       Patient's Goal:  disclosing safety concerns, practicing coping skills, and planning coping skills.     Notes:  Pt denied SI/HI on the check out form. Pt interacted appropriately with peers. Pt presented as receptive to gentle redirection. Pt was observed to participate in the meditation though presented as distracted. Pt appropriately started her Monday schedule activity. Pt will continue to work on disclosing safety concerns, practicing coping skills, and planning coping skills.     Status After Intervention:  Unchanged    Participation Level: Active Listener and Interactive    Participation Quality: Appropriate and Attentive      Speech:  normal      Thought Process/Content: Logical      Affective Functioning: Congruent      Mood: anxious      Level of consciousness:  Alert, Oriented x4, and Attentive      Response to Learning: Able to verbalize current knowledge/experience and Progressing to goal      Endings: None Reported    Modes of Intervention: Problem-solving and Activity      Discipline Responsible: /Counselor      Signature:  MIESHA RAMÍREZ

## 2024-05-23 NOTE — BH NOTE
OUTPATIENT PHYSICIAN PROGRESS NOTE      Chief Complaint/Symptoms/Impairments (as noted in Treatment Plan): \"I've been not really taking my PRNs\"    Criteria for Continued Treatment (check all that apply):   [x] Preventing Decompensation   [] Improving Level of Functioning  [] Reducing Isolative Behaviors  [] Understanding Diagnosis and Need for Medications  [] Improving Treatment/Medication Compliance  [x] Confronting Denial of Illness  [] Stabilizing Level of Functioning  [x] Improving Emotional/Social/Cognitive Functioning  [] Decreasing Frequency of Hospitalizations    Suicidal/Homicidal ideations:   [x] Absent  [] Present  [] Passive  [] Intent  [] Plan  [] Death Wishes      CURRENT CONDITION OF PATIENT & PROGRESS ON TREATMENT PLAN    Subjective (ongoing complaints by patient regarding symptom severity, presentation, affect, function, etc.): She says she doesen't really know how she's expecting to feel on Prozac but has not needed her PRNs in a while. Encouraged her to look at this as being more functional with fewer symptoms.      Behavior (side effects, changes in mental status, objective observations seen in individual sessions or by staff): EC good, speech fluent. TP linear, logical. Mood \"OK I guess\" affect full. Denies SI/HI, no evident delusions or hallucinations.      Assessment/Plan (functional improvement and/or ongoing impairment, response to treatment, plan for future ongoing treatment and medication management to include revisions):   Continue current treatment        [x] NO NEW Medications at this time.   [] New Medication prescribed and prescription provided to patient  [] PHP Nurse notified of changes as needed    [x] Regarding any medications: patient instructed on purpose, benefits, side effects,   risks, and alternative treatments. Patient verbalizes understanding of instructions and has had the opportunity to ask questions.    Rachael Suggs MD  05/23/24   12:11 PM

## 2024-05-23 NOTE — BH NOTE
[id number] Writer called Dr Suggs regarding pt's bp, which continued to be flagged as abnormal in flowsheets following being retaken. Writer informed Dr Suggs of the pt's bp, denial of physical symptoms, reported stress, and report of ingesting caffeine and nicotine on her way to [facility]. Dr Suggs stated no need for ED evaluation.    MIESHA Daley

## 2024-05-23 NOTE — GROUP NOTE
Group Therapy Note    Date: 5/23/2024    Group Start Time: 12:00 PM  Group End Time:  1:00 PM  Group Topic: Psychoeducation    [facility]    Nayana Barrett MSW        Group Therapy Note    Writer facilitated treatment planning group and brought educational materials on mastery building. Writer opened with providing pts space to share about stressors per pt request. Writer utilized open ended questions and reflections to support sharing. Writer encouraged peer feedback and support. Writer provided brief education on communication skills.    Attendees: 3       Patient's Goal:  sharing and receiving feedback in peer processing    Notes:  Pt presented as attentive and engaged as evidenced by spontaneous participation. Pt reported having stressors to process. Pt identified taking on the stress of a close friend as well as anxiety around communicating with that friend for fear of adding to that friend's stress. Pt presented as receptive to feedback from writer and peers. Pt provided healthy, supportive feedback to a peer regarding advocating with psychiatric medication providers. Pt will continue to work on sharing and receiving feedback in peer processing.    Status After Intervention:  Unchanged    Participation Level: Active Listener and Interactive    Participation Quality: Appropriate, Attentive, and Sharing      Speech:  normal      Thought Process/Content: Logical      Affective Functioning: Congruent      Mood: anxious      Level of consciousness:  Alert, Oriented x4, and Attentive      Response to Learning: Able to verbalize current knowledge/experience and Progressing to goal      Endings: None Reported    Modes of Intervention: Support      Discipline Responsible: /Counselor      Signature:  MIESHA RAMÍREZ

## 2024-05-23 NOTE — GROUP NOTE
Group Therapy Note    Date: 5/23/2024    Group Start Time:  1:10 PM  Group End Time:  2:00 PM  Group Topic: Psychoeducation    [facility]    Tala Tanner MSW        Group Therapy Note    Patients were given an educational sheet about anxiety. Patients were prompted to discuss symptoms of anxiety and different types of anxiety. Patients were encouraged to discuss triggers and coping skills. Patients were given a sheet to complete an exposure hierarchy to challenge anxiety.    Attendees: 5       Patient's Goal:  Identify and discuss symptoms of anxiety, discuss triggers for anxiety responses, identify ways to challenge anxiety    Notes:  The patient engaged actively in psychoeducation group. The patient asked peers for feedback relating to anxiety and panic. The patient identified symptoms of anxiety and panic. The patient identified triggers for anxiety. The patient completed an anxiety hierarchy. The patient will continue to practice challenging anxiety and using coping skills.    Status After Intervention:  Unchanged    Participation Level: Active Listener and Interactive    Participation Quality: Appropriate, Attentive, and Sharing      Speech:  normal      Thought Process/Content: Logical  Linear      Affective Functioning: Congruent      Mood: euthymic      Level of consciousness:  Alert, Oriented x4, and Attentive      Response to Learning: Able to verbalize current knowledge/experience, Capable of insight, and Progressing to goal      Endings: None Reported    Modes of Intervention: Education and Exploration      Discipline Responsible: /Counselor      Signature:  MIESHA Garrison

## 2024-05-23 NOTE — GROUP NOTE
Group Therapy Note    Date: 5/23/2024    Group Start Time:  9:40 AM  Group End Time: 10:30 AM  Group Topic: Process Group - Outpatient    [facility]    Johan Mackey MSW        Group Therapy Note    This writer started group by continuing conversation from community meeting. Patients supported and related to peer processing feeling anger, setting boundaries, and finding time to do self care activities for the remainder of group.      Attendees: 5          Patient's Goal: to process emotions, to engage and provide support to peers       Notes:  Pt was present and engaged in group. Pt related to peer processing taking care of her father and panic cleaning before he comes home. Pt was receptive to feedback and encouraged peer to set boundary with mother just like she needs to with father. Pt will continue to work towards goal.    Status After Intervention:  Improved    Participation Level: Active Listener    Participation Quality: Appropriate      Speech:  normal      Thought Process/Content: Logical      Affective Functioning: Congruent      Mood: anxious      Level of consciousness:  Alert and Oriented x4      Response to Learning: Capable of insight and Progressing to goal      Endings: None Reported    Modes of Intervention: Support, Socialization, and Exploration      Discipline Responsible: /Counselor      Signature:  MIESHA Hernandez

## 2024-05-23 NOTE — GROUP NOTE
Group Therapy Note    Date: 5/23/2024    Group Start Time:  9:00 AM  Group End Time:  9:40 AM  Group Topic: Community Meeting    [facility]    Johan Mackey MSW        Group Therapy Note    This writer started group with requesting patients to complete check in sheets to assess safety. Patients were guided through a PMR meditation and encouraged to set an intention for the day to share with peers. Patients requested to start processing earlier due to more than two people needing advice from peers. To conclude, one patient was able to process and the conversation continued on to the next group.      Attendees: 4        Patient's Goal: to identify goal of the day, to assess safety, to process emotions       Notes:  Pt arrived 25 mins late. Pt completed check in sheet and denied SI/HI. Patient appeared quiet and stated needing time to process something later on in the day. Pt actively listened to peers and will continue to work towards goal.    Status After Intervention:  Unchanged    Participation Level: Active Listener    Participation Quality: Appropriate      Speech:  normal      Thought Process/Content: Logical      Affective Functioning: Congruent      Mood: anxious      Level of consciousness:  Alert      Response to Learning: Progressing to goal      Endings: None Reported    Modes of Intervention: Support, Socialization, and Exploration      Discipline Responsible: /Counselor      Signature:  MIESHA Hernandez

## 2024-05-23 NOTE — GROUP NOTE
Group Therapy Note    Date: 5/23/2024    Group Start Time: 10:40 AM  Group End Time: 11:30 AM  Group Topic: Cognitive Skills    [facility]    Nayana Barrett MSW        Group Therapy Note    Writer facilitated cognitive skills group. Clovis from [employer] and 2 CoW interns were present as guest speakers for the first half of group. Clovis provided education on his agency and the services they offer. Clovis provided handouts and education on the 6 Dimensions of Wellness. He encouraged pts to ask questions. When Clovis left, writer utilized socratic and open ended questions to support further processing of the handout.     Attendees: 4       Patient's Goal:   learning about the dimensions of wellness.    Notes:  Pt presented as attentive and engaged as evidenced by spontaneous participation. Pt asked appropriate questions and answered questions from the guest speaker appropriately. Pt engaged in discussion with peers during the time after the guest speaker stepped out. Pt will continue to work on learning about the dimensions of wellness.    Status After Intervention:  Unchanged    Participation Level: Active Listener and Interactive    Participation Quality: Appropriate, Attentive, and Sharing      Speech:  normal      Thought Process/Content: Logical      Affective Functioning: Congruent      Mood: euthymic      Level of consciousness:  Alert, Oriented x4, and Attentive      Response to Learning: Able to verbalize current knowledge/experience and Progressing to goal      Endings: None Reported    Modes of Intervention: Education      Discipline Responsible: /Counselor      Signature:  MIESHA RAMÍREZ

## 2024-05-24 ENCOUNTER — HOSPITAL ENCOUNTER (OUTPATIENT)
Facility: HOSPITAL | Age: 24
Setting detail: RECURRING SERIES
Discharge: HOME | End: 2024-05-27
Payer: MEDICAID

## 2024-05-24 VITALS
DIASTOLIC BLOOD PRESSURE: 83 MMHG | HEART RATE: 96 BPM | OXYGEN SATURATION: 98 % | TEMPERATURE: 98.3 F | SYSTOLIC BLOOD PRESSURE: 114 MMHG

## 2024-05-24 PROCEDURE — 90853 GROUP PSYCHOTHERAPY: CPT

## 2024-05-24 NOTE — GROUP NOTE
Group Therapy Note    Date: 5/24/2024    Group Start Time:  1:10 PM  Group End Time:  2:00 PM  Group Topic: Art Therapy     [facility]    Tala Tanner MSW        Group Therapy Note    Patients were asked to engage in an art activity, creating a poster for coping skills for them to reference. Patients were encouraged to be creative and decorate posters in a way that would be engaging to them. Patients were asked to share their posters with their peers and identify their coping skills.    Attendees: 6       Patient's Goal:  Identify coping skills, express self creatively, engage in activity     Notes:  The patient engaged actively in art group. The patient created a coping skill poster with corresponding images. The patient identified coping skills to peers. The patient engaged actively with peers. The patient will continue to practice engaging creatively and making self-care tools.    Status After Intervention:  Unchanged    Participation Level: Active Listener and Interactive    Participation Quality: Appropriate, Attentive, and Sharing      Speech:  normal      Thought Process/Content: Logical  Linear      Affective Functioning: Congruent      Mood: euthymic and cheerful      Level of consciousness:  Alert, Oriented x4, and Attentive      Response to Learning: Able to verbalize current knowledge/experience and Progressing to goal      Endings: None Reported    Modes of Intervention: Exploration and Activity      Discipline Responsible: /Counselor      Signature:  MIESHA Garrison

## 2024-05-24 NOTE — GROUP NOTE
Group Therapy Note    Date: 5/24/2024    Group Start Time:  9:40 AM  Group End Time: 10:30 AM  Group Topic: Process Group - Outpatient    [facility]    Johan Mackey MSW        Group Therapy Note    This writer facilitated group and encouraged patients to continue processing from previous group. Patients engaged in a discussion around setting boundaries with parents, communicating needs, and identifying values. Patients processed and provided feedback to each other the entire group.           Attendees: 6        Patient's Goal: to process emotions, to learn coping skills, to engage with peers      Notes:  Pt was present and engaged in group. Patient related to peer struggling with both being mad and wanting a decent relationship with dad. Pt shared that her relationship with him was better when she lived by herself. Pt was receptive to feedback and also provided it to peers.    Status After Intervention:  Improved    Participation Level: Active Listener    Participation Quality: Appropriate      Speech:  normal      Thought Process/Content: Logical      Affective Functioning: Congruent      Mood: anxious      Level of consciousness:  Alert and Oriented x4      Response to Learning: Capable of insight and Progressing to goal      Endings: None Reported    Modes of Intervention: Support, Socialization, and Exploration      Discipline Responsible: /Counselor      Signature:  MIESHA Hernandez

## 2024-05-24 NOTE — GROUP NOTE
Group Therapy Note    Date: 5/24/2024    Group Start Time:  2:00 PM  Group End Time:  2:45 PM  Group Topic: Wrap-Up    [facility]    Johan Mackey MSW        Group Therapy Note    This writer facilitated wrap up group and started with handing out wrap up sheets to assess safety, mood, and takeaways. Patients were reminded that PHP will be closed on Monday due to holiday. After wrap up sheet, patients were asked to write down one affirmation and place it in a bowl. This writer went around the room and asked pt's to randomly pick an affirmation from bowl and keep it. Patients were also asked to read them out loud. To conclude, this writer facilitated a self-care hot potato game which required patients to identify one self-care activity they will do this weekend if the music stops when ball is in their hand.        Attendees: 7      Patient's Goal: to assess safety, to identify self-care activities, to improve self-esteem      Notes:  Pt was present and participatory in group. Pt denied SI/HI on wrap up sheet. Patient reflected on feeling anxious but wanting to practice coping skills in group. Pt participated in both activities and identified watching law and order with candles as self-care activity.    Status After Intervention:  Improved    Participation Level: Active Listener    Participation Quality: Appropriate      Speech:  normal      Thought Process/Content: Logical      Affective Functioning: Congruent      Mood: anxious      Level of consciousness:  Alert and Oriented x4      Response to Learning: Able to verbalize/acknowledge new learning, Capable of insight, and Progressing to goal      Endings: None Reported    Modes of Intervention: Support, Socialization, and Exploration      Discipline Responsible: /Counselor      Signature:  MIESHA Hernandez

## 2024-05-24 NOTE — GROUP NOTE
Group Therapy Note    Date: 5/24/2024    Group Start Time: 12:00 PM  Group End Time:  1:00 PM  Group Topic: Relapse Prevention    [facility]    Johan Mackey MSW        Group Therapy Note    This writer facilitated group and started with a journal exercise. Patients were asked to journal about relapse, barriers, and motivation to follow relapse prevention plan. Patients shared reflections and connected with each other on relapse surrounding substances, depression, and self-harm. To conclude, patients were provided with a template to create relapse prevention plan. Patients started to fill worksheet but were unable to do so due to time. Patients were encouraged to fill them out at later time.       Attendees: 6        Patient's Goal: to identify coping skills, to learn about relapse prevention      Notes:  Pt was present and alert in group. Pt was seen completing reflection but declined to share it with peers. Pt actively listened to peers and was supportive towards them by giving them words of encouragement.     Status After Intervention:  Unchanged    Participation Level: Active Listener    Participation Quality: Appropriate      Speech:  normal      Thought Process/Content: Logical      Affective Functioning: Congruent      Mood: anxious      Level of consciousness:  Alert and Oriented x4      Response to Learning: Capable of insight and Progressing to goal      Endings: None Reported    Modes of Intervention: Support, Socialization, and Exploration      Discipline Responsible: /Counselor      Signature:  MIESHA Hernandez

## 2024-05-24 NOTE — GROUP NOTE
Group Therapy Note    Date: 5/24/2024    Group Start Time:  9:00 AM  Group End Time:  9:40 AM  Group Topic: Community Meeting    [facility]    Johan Mackey MSW        Group Therapy Note    This writer facilitated group and started with requesting patients to complete check in sheet to assess safety. Patients were asked to  a sticky note and identify a specific goal for the day to share with peers. Patient requested to start processing leaving home early on Wednesday and was encouraged to continue to conversation into the next group.        Attendees: 4      Patient's Goal: to assess safety, to identify goal of the day, to process emotions       Notes:  Pt was present and engaged in group. Pt completed check in sheet and denied SI/HI. 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 Pt actively listened to peers and was supportive towards them.    Status After Intervention:  Improved    Participation Level: Active Listener    Participation Quality: Appropriate      Speech:  normal      Thought Process/Content: Logical      Affective Functioning: Congruent      Mood: anxious      Level of consciousness:  Alert and Oriented x4      Response to Learning: Capable of insight and Progressing to goal      Endings: None Reported    Modes of Intervention: Support, Socialization, and Exploration      Discipline Responsible: /Counselor      Signature:  MIESHA Hernandez

## 2024-05-24 NOTE — BH NOTE
Pt came to writer at 12:15pm asking for vitals to be taken due to feeling \"like I'm dying.\" Pt described feeling a racing heart. Pt vitals came back stable. Pt reported feeling better knowing vitals and returned to group.    MIESHA Garrison

## 2024-05-24 NOTE — GROUP NOTE
Group Therapy Note    Date: 5/24/2024    Group Start Time: 10:40 AM  Group End Time: 11:30 AM  Group Topic: Cognitive Skills    [facility]    Tala Tanner MSW        Group Therapy Note    Patients were offered space for processing and identification of feelings. Patients were encouraged to offer feedback and support to peers. Patients were given a sheet describing DBT skills for distress tolerance. Patients were asked to identify skills that belonged to different categories in the IMPROVE acronym. Patients introduced themselves to a new group member.    Attendees: 7       Patient's Goal:  Understand and identify distress tolerance skills, identify examples, engage with peers    Notes:  The patient engaged actively in cognitive skills group. The patient processed feelings of anxiety and somatic symptoms with peers. The patient was receptive to feedback and support. The patient identified distress tolerance skills. The patient engaged actively in introductions with peers an provided encouragement. The patient will continue to practice identifying emotions and using coping skills.    Status After Intervention:  Improved    Participation Level: Active Listener and Interactive    Participation Quality: Appropriate, Attentive, Sharing, and Supportive      Speech:  normal      Thought Process/Content: Logical  Linear      Affective Functioning: Congruent      Mood: euthymic      Level of consciousness:  Alert, Oriented x4, and Attentive      Response to Learning: Able to verbalize current knowledge/experience, Able to verbalize/acknowledge new learning, Capable of insight, and Progressing to goal      Endings: None Reported    Modes of Intervention: Education and Exploration      Discipline Responsible: /Counselor      Signature:  MIESHA Garrison

## 2024-05-28 ENCOUNTER — HOSPITAL ENCOUNTER (OUTPATIENT)
Facility: HOSPITAL | Age: 24
Setting detail: RECURRING SERIES
Discharge: HOME | End: 2024-05-31
Payer: MEDICAID

## 2024-05-28 VITALS
TEMPERATURE: 98.1 F | DIASTOLIC BLOOD PRESSURE: 87 MMHG | HEART RATE: 87 BPM | OXYGEN SATURATION: 100 % | SYSTOLIC BLOOD PRESSURE: 112 MMHG

## 2024-05-28 PROCEDURE — 90832 PSYTX W PT 30 MINUTES: CPT

## 2024-05-28 PROCEDURE — 90853 GROUP PSYCHOTHERAPY: CPT

## 2024-05-28 NOTE — GROUP NOTE
Group Therapy Note    Date: 5/28/2024    Group Start Time:  2:00 PM  Group End Time:  2:45 PM  Group Topic: Wrap-Up    [facility]    Sal Capellan LCSW        Group Therapy Note    The patients were encouraged to complete the afternoon assessment.  The patients were encouraged to plan their afternoon agenda prioritize self-care or practicing a coping skills this afternoon.       Attendees: 6       Patient's Goal:  Identify mood and plan for self-care    Notes:  The patient completed the afternoon assessment and denied SI/HI. The patient completed an afternoon agenda and shared plans with peers. The patient shared that she plan to finish preparing for an event this weekend as well as spending time with her cats for self-care.  The patient will continue to identify mood and plan for self-care.     Status After Intervention:  Unchanged    Participation Level: Active Listener and Interactive    Participation Quality: Appropriate and Attentive      Speech:  normal      Thought Process/Content: Logical      Affective Functioning: Congruent      Mood: euthymic      Level of consciousness:  Alert, Oriented x4, and Attentive      Response to Learning: Able to verbalize current knowledge/experience, Capable of insight, and Progressing to goal      Endings: None Reported    Modes of Intervention: Support, Socialization, and Activity      Discipline Responsible: /Counselor      Signature:  Sal Capellan LCSW

## 2024-05-28 NOTE — GROUP NOTE
Group Therapy Note    Date: 5/28/2024    Group Start Time: 12:00 PM  Group End Time:  1:00 PM  Group Topic: Relapse Prevention    [facility]    Nayana Barrett MSW        Group Therapy Note    Writer facilitated relapse prevention group and brought materials for education on building discrepancy and stages of change. Writer opened by providing space for a pt to share who had reported needing to process the weekend. Writer utilized therapeutic use of silence, reflective statements, validation, and open ended questions to support sharing. Writer encouraged peer feedback and support. Writer closed with box breathing and a mindfulness walk to support group regulation.    Attendees: 6       Patient's Goal:  discussing stressors and appropriately receiving peer feedback.    Notes:  Pt presented as attentive and engaged as evidenced by spontaneous participation. Pt asked appropriate questions, provided validation, and encouraged safe behaviors in a peer. Pt provided ideas for increasing safety. Pt engaged appropriately in the mindfulness walk. Pt will continue to work on discussing stressors and appropriately receiving peer feedback.    Status After Intervention:  Unchanged    Participation Level: Active Listener and Interactive    Participation Quality: Appropriate, Attentive, and Supportive      Speech:  normal      Thought Process/Content: Logical      Affective Functioning: Congruent      Mood: euthymic      Level of consciousness:  Alert, Oriented x4, and Attentive      Response to Learning: Able to verbalize current knowledge/experience and Progressing to goal      Endings: None Reported    Modes of Intervention: Support and Movement      Discipline Responsible: /Counselor      Signature:  MIESHA RAMÍREZ

## 2024-05-28 NOTE — GROUP NOTE
Group Therapy Note    Date: 5/28/2024    Group Start Time:  9:40 AM  Group End Time: 10:30 AM  Group Topic: Process Group - Outpatient    [facility]    Nayana Barrett MSW        Group Therapy Note    Writer facilitated process group. Writer encouraged peers to share about stressors, positive progress, and life changes. Writer encouraged peer feedback and support through therapeutic use of silence, reflective statements, and questions.     Attendees: 6       Patient's Goal:  engaging with peer feedback and support.    Notes:  Pt presented as attentive and engaged as evidenced by spontaneous participation. Pt provided appropriate feedback and support to a peer. Pt discussed an anxiety trigger experienced over the weekend and how her father exacerbated the situation. Pt discussed her relationship with her father. Pt presented as receptive to feedback and support from peers. Pt will continue to work on engaging with peer feedback and support.     Status After Intervention:  Unchanged    Participation Level: Active Listener and Interactive    Participation Quality: Appropriate, Attentive, Sharing, and Supportive      Speech:  normal      Thought Process/Content: Logical      Affective Functioning: Congruent      Mood: euthymic      Level of consciousness:  Alert, Oriented x4, and Attentive      Response to Learning: Able to verbalize current knowledge/experience, Capable of insight, and Progressing to goal      Endings: None Reported    Modes of Intervention: Support and Socialization      Discipline Responsible: /Counselor      Signature:  MIESHA RAMÍREZ

## 2024-05-28 NOTE — GROUP NOTE
Group Therapy Note    Date: 5/28/2024    Group Start Time:  1:10 PM  Group End Time:  2:00 PM  Group Topic: Psychoeducation    [facility]    Sal Capellan LCSW        Group Therapy Note    The patients were shown a kain talk on the benefits of exercise for mental health.  The patient's were encouraged to discuss information presented. A patient who was previously with their individual therapist requested time to disclose and process personal history.     Attendees: 6       Patient's Goal:  Accept information on exercise and mental health, process peer disclosure    Notes:  The patient was open to information on mental health and exercise. The patient participated in discussion on exercise and mental health.  The patient participated in listening and supporting peer who disclosed. The patient will continue to accept information and be present for peers.    Status After Intervention:  Unchanged    Participation Level: Active Listener and Interactive    Participation Quality: Appropriate, Attentive, Sharing, and Supportive      Speech:  normal      Thought Process/Content: Logical      Affective Functioning: Congruent      Mood: euthymic      Level of consciousness:  Alert, Oriented x4, and Attentive      Response to Learning: Able to verbalize current knowledge/experience, Capable of insight, and Progressing to goal      Endings: None Reported    Modes of Intervention: Education and Support      Discipline Responsible: /Counselor      Signature:  Sal Capellan LCSW

## 2024-05-28 NOTE — GROUP NOTE
Group Therapy Note    Date: 5/28/2024    Group Start Time: 10:40 AM  Group End Time: 11:30 AM  Group Topic: Cognitive Skills    [facility]    Tala Tanner MSW        Group Therapy Note    Patients were given a sheet with a \"self-doubt mountain\" sheet, prompting them to identify a goal as well as obstacles to that goals. Patients were asking to discuss the role self-doubt plays in barriers/limitations to their goal. Patients were asked to discuss strategies for combating self-doubt and lack of motivation. Patients were given a sheet prompting them to identify inverse statements to \"what if\" questions.    Attendees: 4       Patient's Goal:  Identify examples and sources of self-doubt, discuss ways to combat self-doubt and work towards goals, support peers    Notes:  The patient did not engage in cognitive skills group due to meeting individually with a  for a majority of group time.    Status After Intervention:  N/A    Participation Level: N/A    Participation Quality: N/A      Speech:  N/A      Thought Process/Content: N/A      Affective Functioning: N/A      Mood: N/A      Level of consciousness:  N/A      Response to Learning: N/A      Endings: N/A    Modes of Intervention: N/A      Discipline Responsible: /Counselor      Signature:  MIESHA Garrison

## 2024-05-28 NOTE — BH NOTE
Partial Hospitalization Program Individual Psychotherapy Note      Diagnosis: F43.10 Post traumatic stress disorder, F41.9 anxiety disorder     Goal: 3.2, referral to community services        Psychotherapy Session    Start time: 1054  Stop time: 1124        Patient Mental Status and Mood/Affect:Calm and Congruent    Patient Behavior and Appearance: Cooperative and Good eye contactshows no evidence of impairment    Intervention/Techniques: Informed, Validated/Supported, Listened/Empathized, and Provided Feedback    Focus of Session/Patient Response and Progress Towards Goal: Writer met with pt for individual session, ITP review, and discharge discussion. Session focused on goal 3.2, referral to community services. Pt demonstrated progress through discussing needs.     Narrative: Writer opened with informing pt of the purpose and itinerary of the session. Writer reminded pt of the d/c date of 6/7/24 and asked how pt feels about this as a d/c date. Pt stated concerns regarding ongoing changes to medications due to side effects and continued need for anxiety management. Pt also reported feeling she is \"finally opening up and making progress\" and that she would probably benefit from an extension. Writer and pt agreed to a new d/c date of 6/14/24 to allow for further monitoring of her medications. Writer transitioned to ITP review. Pt answered all questions appropriately and presented as receptive to feedback on progress. Pt discussed guilt around using her PRN and presented as receptive to challenging. Pt signed ITP appropriately. Writer and pt discussed pt's discharge needs. Pt reported no interest in IOP and reported current providers. Writer stated she would contact the OP to provide updates. Pt stated she would update the OP as well.     MIESHA Daley

## 2024-05-28 NOTE — BH NOTE
OUTPATIENT PHYSICIAN PROGRESS NOTE      Chief Complaint/Symptoms/Impairments (as noted in Treatment Plan): ***    Criteria for Continued Treatment (check all that apply):   [] Preventing Decompensation   [] Improving Level of Functioning  [] Reducing Isolative Behaviors  [] Understanding Diagnosis and Need for Medications  [] Improving Treatment/Medication Compliance  [] Confronting Denial of Illness  [] Stabilizing Level of Functioning  [] Improving Emotional/Social/Cognitive Functioning  [] Decreasing Frequency of Hospitalizations    Suicidal/Homicidal ideations:   [] Absent  [] Present  [] Passive  [] Intent  [] Plan  [] Death Wishes      CURRENT CONDITION OF PATIENT & PROGRESS ON TREATMENT PLAN    Subjective (ongoing complaints by patient regarding symptom severity, presentation, affect, function, etc.): ***      Behavior (side effects, changes in mental status, objective observations seen in individual sessions or by staff): C good, speech fluent. TP linear, logical. Mood \"OK I guess\" affect full. Denies SI/HI, no evident delusions or hallucinations.      Assessment/Plan (functional improvement and/or ongoing impairment, response to treatment, plan for future ongoing treatment and medication management to include revisions): ***        [] NO NEW Medications at this time.   [] New Medication prescribed and prescription provided to patient  [] PHP Nurse notified of changes as needed    [] Regarding any medications: patient instructed on purpose, benefits, side effects,   risks, and alternative treatments. Patient verbalizes understanding of instructions and has had the opportunity to ask questions.    Rachael Suggs MD  05/28/24   11:43 AM

## 2024-05-28 NOTE — GROUP NOTE
Group Therapy Note    Date: 5/28/2024    Group Start Time:  9:00 AM  Group End Time:  9:40 AM  Group Topic: Community Meeting    [facility]    Nayana Barrett MSW        Group Therapy Note     Writer facilitated community group with a focus on assessing safety and pt processing. Writer assessed for safety by providing, collecting, and reviewing the morning check in forms. Writer encouraged pts to share about recent or upcoming events, to include stressors or positive developments. Writer encouraged peer feedback and discussion through open ended questions and therapeutic reflection.     Attendees: 6       Patient's Goal:  disclosing safety concerns and engaging with peer feedback and support    Notes:  Pt denied SI/HI on check in form. Pt presented as attentive and engaged as evidenced by spontaneous participation. Pt provided validation and feedback to peers. Pt suggested ways to gain closure on a relationship ending. Pt will continue to work on disclosing safety concerns and engaging with peer feedback and support.    Status After Intervention:  Unchanged    Participation Level: Active Listener and Interactive    Participation Quality: Appropriate, Attentive, and Supportive      Speech:  normal      Thought Process/Content: Logical      Affective Functioning: Congruent      Mood: euthymic      Level of consciousness:  Alert, Oriented x4, and Attentive      Response to Learning: Able to verbalize current knowledge/experience and Progressing to goal      Endings: None Reported    Modes of Intervention: Support and Socialization      Discipline Responsible: /Counselor      Signature:  MIESHA RAMÍREZ

## 2024-05-29 ENCOUNTER — HOSPITAL ENCOUNTER (EMERGENCY)
Facility: HOSPITAL | Age: 24
Discharge: HOME | End: 2024-05-29
Attending: EMERGENCY MEDICINE
Payer: MEDICAID

## 2024-05-29 ENCOUNTER — HOSPITAL ENCOUNTER (OUTPATIENT)
Facility: HOSPITAL | Age: 24
Setting detail: RECURRING SERIES
Discharge: HOME | End: 2024-06-01
Payer: MEDICAID

## 2024-05-29 VITALS
SYSTOLIC BLOOD PRESSURE: 125 MMHG | DIASTOLIC BLOOD PRESSURE: 96 MMHG | OXYGEN SATURATION: 99 % | HEART RATE: 79 BPM | BODY MASS INDEX: 22.73 KG/M2 | TEMPERATURE: 98.1 F | RESPIRATION RATE: 16 BRPM | HEIGHT: 61 IN | WEIGHT: 120.37 LBS

## 2024-05-29 VITALS
OXYGEN SATURATION: 99 % | DIASTOLIC BLOOD PRESSURE: 88 MMHG | HEART RATE: 93 BPM | SYSTOLIC BLOOD PRESSURE: 122 MMHG | TEMPERATURE: 98.1 F

## 2024-05-29 DIAGNOSIS — F41.9 ANXIETY DISORDER, UNSPECIFIED TYPE: ICD-10-CM

## 2024-05-29 DIAGNOSIS — R06.00 DYSPNEA, UNSPECIFIED TYPE: Primary | ICD-10-CM

## 2024-05-29 DIAGNOSIS — F41.9 ANXIETY DISORDER, UNSPECIFIED TYPE: Primary | ICD-10-CM

## 2024-05-29 PROCEDURE — 99282 EMERGENCY DEPT VISIT SF MDM: CPT

## 2024-05-29 PROCEDURE — 90853 GROUP PSYCHOTHERAPY: CPT

## 2024-05-29 RX ORDER — BUSPIRONE HYDROCHLORIDE 5 MG/1
TABLET ORAL
COMMUNITY
Start: 2024-05-16

## 2024-05-29 RX ORDER — LORAZEPAM 0.5 MG/1
0.5 TABLET ORAL EVERY 12 HOURS PRN
Qty: 30 TABLET | Refills: 0 | Status: SHIPPED | OUTPATIENT
Start: 2024-05-29 | End: 2024-06-28

## 2024-05-29 ASSESSMENT — ENCOUNTER SYMPTOMS
EYE PAIN: 0
VOMITING: 0
BACK PAIN: 0
SHORTNESS OF BREATH: 1
ABDOMINAL PAIN: 0
SORE THROAT: 0
CHEST TIGHTNESS: 0

## 2024-05-29 ASSESSMENT — PAIN - FUNCTIONAL ASSESSMENT: PAIN_FUNCTIONAL_ASSESSMENT: 0-10

## 2024-05-29 NOTE — ED TRIAGE NOTES
Patient arrives with complaint of waking up the past few nights with right hand numbness and feeling short of breath. Patient reports shaking it makes the \"numbness\" go away. Patient reports her psychiatrist believes patient has sleep apnea. Patient denies any symptoms at this time.

## 2024-05-29 NOTE — GROUP NOTE
Group Therapy Note    Date: 5/29/2024    Group Start Time:  1:10 PM  Group End Time:  2:00 PM  Group Topic: Psychoeducation    [facility]    Sal Capellan LCSW        Group Therapy Note      The patients were presented with education on diet and mental health. The patient were encouraged to discuss their diets and mental health following presentation of educational materials.      Attendees: 7       Patient's Goal: Accept educational materials on diet and mental health.    Notes: The patient participated in presentation of educational materials and was open to educational materials. The patient discusses personal relationship with food. The patient was playful with difficulty sitting during the presentation of educational materials. The patient will continue to be open to educational materials during the psychotherapy group.     Status After Intervention:  Unchanged    Participation Level: Active Listener and Interactive    Participation Quality: Appropriate, Attentive, Sharing, and Supportive      Speech:  normal      Thought Process/Content: Logical      Affective Functioning: Congruent      Mood: euthymic      Level of consciousness:  Alert, Oriented x4, and Attentive      Response to Learning: Able to verbalize current knowledge/experience, Capable of insight, and Progressing to goal      Endings: None Reported    Modes of Intervention: Education and Exploration      Discipline Responsible: /Counselor      Signature:  Sal Capellan LCSW

## 2024-05-29 NOTE — GROUP NOTE
Group Therapy Note    Date: 5/29/2024    Group Start Time:  2:00 PM  Group End Time:  2:45 PM  Group Topic: Wrap-Up    [facility]    Nayana Barrett MSW        Group Therapy Note     Writer facilitated a community wrap up group focused on assessing for safety and coping skills practice. Writer facilitated a symptom and safety check in using the afternoon check in form. Writer led pts in a shake it out exercise followed by an anxiety meditation to support releasing anxious energy and thoughts. Writer encouraged reflection and discussion afterwards. Writer closed with a brief ice breaker question for group rapport.    Attendees: 7       Patient's Goal:  disclosing safety concerns and practicing coping skills    Notes:  Pt denied SI/HI on wrap up form. Pt expressed having too much energy to do the meditation and presented as receptive to the shake it out. Pt presented as restless in the meditation. Pt engaged appropriately in peer discussion and ice breaker discussion. Pt will continue to work on disclosing safety concerns and practicing coping skills.    Status After Intervention:  Unchanged    Participation Level: Active Listener and Interactive    Participation Quality: Appropriate and Attentive      Speech:  normal      Thought Process/Content: Logical      Affective Functioning: Congruent      Mood: anxious      Level of consciousness:  Alert, Oriented x4, and Attentive      Response to Learning: Able to verbalize current knowledge/experience and Progressing to goal      Endings: None Reported    Modes of Intervention: Support, Activity, and Movement      Discipline Responsible: /Counselor      Signature:  MIESHA RAMÍREZ

## 2024-05-29 NOTE — ED PROVIDER NOTES
completed with a voice recognition program.  Efforts were made to edit the dictations but occasionally words are mis-transcribed.)    Fam Strickland MD (electronically signed)  Emergency Attending Physician              Fam Strickland MD  05/29/24 0254

## 2024-05-29 NOTE — GROUP NOTE
Group Therapy Note    Date: 5/29/2024    Group Start Time:  9:40 AM  Group End Time: 10:30 AM  Group Topic: Process Group - Outpatient    [facility]    Tala Tanner MSW        Group Therapy Note    Patients were offered space and time to openly process thoughts, feelings, and experiences with peers. Patients were encouraged to offer feedback and support to peers. Patients were encouraged to identify emotions.    Attendees: 7       Patient's Goal:  Process thoughts and emotions, identify feelings, support peers and connect with others     Notes:  The patient engaged in process group. The patient joined group late due to needing vitals to be taken. The patient offered feedback and support to peers sharing. The patient discussed stress related to somatic symptoms and difficulties with friends. The patient was receptive to feedback and support from peers. The patient will continue to practice supporting peers and processing feelings.    Status After Intervention:  Unchanged    Participation Level: Active Listener and Interactive    Participation Quality: Appropriate, Attentive, Sharing, and Supportive      Speech:  normal      Thought Process/Content: Logical  Linear      Affective Functioning: Congruent      Mood: euthymic      Level of consciousness:  Alert, Oriented x4, and Attentive      Response to Learning: Able to verbalize current knowledge/experience, Capable of insight, and Progressing to goal      Endings: None Reported    Modes of Intervention: Support      Discipline Responsible: /Counselor      Signature:  MIESHA Garrison

## 2024-05-29 NOTE — ED NOTES
Condition Stable  Patient discharged to home  Patient education was completed  Education taught to patient  Teaching method used was handout and verbal  Understanding of teaching was good  Patient was discharged ambulatory  Discharged with self  Valuables were given to patient remained in possession of belongings during stay.

## 2024-05-29 NOTE — GROUP NOTE
Group Therapy Note    Date: 5/29/2024    Group Start Time: 12:00 PM  Group End Time:  1:00 PM  Group Topic: Relapse Prevention    [facility]    Sal Capellan LCSW        Group Therapy Note    This writer facilitated the relapse prevention group. The patients were provided a journaling prompt about their family's and support. The patient were provided the opportunity to complete the journaling prompt outside. The patient returned to the group room and discussed journal entries.     Attendees: 7       Patient's Goal: self-disclose about family       Notes: The patient participated in the journaling exercise. The patient participated in discussion with peers about family relationships and dynamics. The patient requested to remain indoors and was encouraged to work on prompt from the group room. The patient presented anxious and restless and was not able to participate in group, utilizing group time to regulate. The patient will continue to journal and self-disclose in the relapse prevention group.      Status After Intervention:  Unchanged    Participation Level: Minimal    Participation Quality: Resistant      Speech:  normal      Thought Process/Content: Logical      Affective Functioning: Congruent      Mood: anxious      Level of consciousness:  Alert, Oriented x4, and Attentive      Response to Learning: Able to verbalize current knowledge/experience, Capable of insight, and Progressing to goal      Endings: None Reported    Modes of Intervention: Support, Socialization, and Activity      Discipline Responsible: /Counselor      Signature:  Sal Capellan LCSW

## 2024-05-29 NOTE — BH NOTE
OUTPATIENT PHYSICIAN PROGRESS NOTE      Chief Complaint/Symptoms/Impairments (as noted in Treatment Plan): DIA, PTSD    Criteria for Continued Treatment (check all that apply):   [x] Preventing Decompensation   [] Improving Level of Functioning  [x] Reducing Isolative Behaviors  [] Understanding Diagnosis and Need for Medications  [] Improving Treatment/Medication Compliance  [] Confronting Denial of Illness  [] Stabilizing Level of Functioning  [x] Improving Emotional/Social/Cognitive Functioning  [] Decreasing Frequency of Hospitalizations    Suicidal/Homicidal ideations:   [x] Absent  [] Present  [] Passive  [] Intent  [] Plan  [] Death Wishes      CURRENT CONDITION OF PATIENT & PROGRESS ON TREATMENT PLAN    Subjective (ongoing complaints by patient regarding symptom severity, presentation, affect, function, etc.):  Kaylee Norris reports stopping medications due to a strange new situation.  Has been having episodes where she perceives that her breathing stops all together.  Went to the ED and was told it may be anxiety.  She wants another opinion as this feels different than her usual anxiety.  Stopped her Buspar and feels that Prozac may be the culprit.  Managing well in the groups setting overall.  No aggression or violence.  Appropriately interactive and aware.  Tolerating medications well.  Appetite is fair.  Has a program she is in this weekend and does not want any major changes.        Behavior (side effects, changes in mental status, objective observations seen in individual sessions or by staff): Kaylee Berrios is calm cooperative, clear and coherent with speech of average rate volume and tone.  Moods are fair.  Affect is fair range. Appropriately dressed and groomed.  Denies SI/HI/AH/VH.  No aggression or violence.  Appropriately interactive and aware.  Insight is good and judgement is fair.        Assessment/Plan (functional improvement and/or ongoing impairment, response to treatment, plan for

## 2024-05-29 NOTE — GROUP NOTE
Group Therapy Note    Date: 5/29/2024    Group Start Time: 10:40 AM  Group End Time: 11:30 AM  Group Topic: Cognitive Skills    [facility]    Nayana Barrett MSW        Group Therapy Note    Writer facilitated cognitive skills group and came with education on SCOTT. Writer opened with offering space for peers to share as 2 peers were observed to be in deep conversation when writer arrived. One peer asked to share and discussed recently learning of a death in the family. Writer supported peer feedback and support. Writer provided brief education on grief.     Attendees: 6       Patient's Goal:  engaging in peer discussion and processing grief    Notes:  Pt presented as attentive as evidenced by appropriate eye contact. Pt presented as uncomfortable as evidenced by stepping out multiple times and being observed to take a PRN medication. Pt provided brief supportive feedback. Pt will continue to work on engaging in peer discussion and processing grief.    Status After Intervention:  Unchanged    Participation Level: Active Listener    Participation Quality: Appropriate and Attentive      Speech:  normal      Thought Process/Content: Logical      Affective Functioning: Congruent      Mood: anxious      Level of consciousness:  Alert, Oriented x4, and Attentive      Response to Learning: Able to verbalize current knowledge/experience and Progressing to goal      Endings: None Reported    Modes of Intervention: Education and Support      Discipline Responsible: /Counselor      Signature:  MIESHA RAMÍREZ

## 2024-05-30 ENCOUNTER — HOSPITAL ENCOUNTER (OUTPATIENT)
Facility: HOSPITAL | Age: 24
Setting detail: RECURRING SERIES
End: 2024-05-30
Payer: MEDICAID

## 2024-05-30 VITALS
SYSTOLIC BLOOD PRESSURE: 113 MMHG | HEART RATE: 98 BPM | DIASTOLIC BLOOD PRESSURE: 93 MMHG | TEMPERATURE: 98.1 F | OXYGEN SATURATION: 100 %

## 2024-05-30 PROCEDURE — 90853 GROUP PSYCHOTHERAPY: CPT

## 2024-05-30 NOTE — GROUP NOTE
Group Therapy Note    Date: 5/30/2024    Group Start Time:  9:45 AM  Group End Time: 10:30 AM  Group Topic: Process Group - Outpatient    [facility]    Nayana Barrett MSW        Group Therapy Note    Writer facilitated process group. Writer opened by prompting gratitude identification. Writer encouraged pts to share issues on which they want support with problem solving, issues they need to process with peers, or events they want to celebrate. The writer prompted and supported peer feedback. Writer provided validation and feedback as well as utilized open ended questions to support further exploration of topics.      Attendees: 5       Patient's Goal:  engaging with peer support and discussion.    Notes:  Pt presented as attentive and engaged as evidenced by spontaneous participation. Pt identified gratitude for her cat and best friend. Pt shared about her relationship with the family friend with dementia who lives with her and her father. Pt identified anxiety related to his death due to poor health. Pt will continue to work on engaging with peer support and discussion.    Status After Intervention:  Unchanged    Participation Level: Active Listener and Interactive    Participation Quality: Appropriate, Attentive, and Sharing      Speech:  normal      Thought Process/Content: Logical      Affective Functioning: Congruent      Mood: anxious      Level of consciousness:  Alert, Oriented x4, and Attentive      Response to Learning: Able to verbalize current knowledge/experience and Progressing to goal      Endings: None Reported    Modes of Intervention: Support and Socialization      Discipline Responsible: /Counselor      Signature:  MIESHA RAMÍREZ

## 2024-05-30 NOTE — GROUP NOTE
Group Therapy Note    Date: 5/30/2024    Group Start Time: 10:40 AM  Group End Time: 11:30 AM  Group Topic: Cognitive Skills    [facility]    Nayana Barrett MSW        Group Therapy Note    Writer facilitated cognitive skills group and brought in handouts for education. Writer opened with verifying if everyone had an opportunity to share in the earlier groups. Writer held space for a peer to process about a trigger experienced the prior day. Writer encouraged peer feedback and support.     Attendees: 7       Patient's Goal:   engaging in peer support and discussion.    Notes:  Pt presented as attentive and engaged as evidenced by spontaneous participation. Pt provided appropriate and supportive feedback. Pt validated a peer's emotions. Pt will continue to work on engaging in peer support and discussion.    Status After Intervention:  Unchanged    Participation Level: Active Listener and Interactive    Participation Quality: Appropriate, Attentive, and Supportive      Speech:  normal      Thought Process/Content: Logical      Affective Functioning: Congruent      Mood: euthymic      Level of consciousness:  Alert, Oriented x4, and Attentive      Response to Learning: Able to verbalize current knowledge/experience and Progressing to goal      Endings: None Reported    Modes of Intervention: Support and Exploration      Discipline Responsible: /Counselor      Signature:  MIESHA RAMÍREZ

## 2024-05-30 NOTE — BH NOTE
1135 Pt asked writer if she could leave early due to significant fatigue and anxiety around a lot of chores to do after PHP. Pt stated only getting 4 hours of sleep. Writer asked pt to eat and try 2 more groups, after which if she still feels to tired to stay she can go. Pt agreed.    1400 Pt asked to leave. Writer provided pt her afternoon wrap up sheet. Pt denied SI/HI. Tala Cantu MSW provided pt with her safety plan for the weekend as pt will not be attending tomorrow, 5/31/24.    MIESHA Daley

## 2024-05-30 NOTE — BH NOTE
OUTPATIENT PHYSICIAN PROGRESS NOTE      Chief Complaint/Symptoms/Impairments (as noted in Treatment Plan): \"I should probably be tested for ADHD\" she is somewhat giddy    Criteria for Continued Treatment (check all that apply):   [x] Preventing Decompensation   [] Improving Level of Functioning  [] Reducing Isolative Behaviors  [] Understanding Diagnosis and Need for Medications  [] Improving Treatment/Medication Compliance  [x] Confronting Denial of Illness  [] Stabilizing Level of Functioning  [] Improving Emotional/Social/Cognitive Functioning  [] Decreasing Frequency of Hospitalizations    Suicidal/Homicidal ideations:   [x] Absent  [] Present  [] Passive  [] Intent  [] Plan  [] Death Wishes      CURRENT CONDITION OF PATIENT & PROGRESS ON TREATMENT PLAN    Subjective (ongoing complaints by patient regarding symptom severity, presentation, affect, function, etc.): Reports doing OK, stopped Buspar yesterday, no adverse effects. Not needing prn meds as much.      Behavior (side effects, changes in mental status, objective observations seen in individual sessions or by staff): Appropriately dressed and groomed, EC intermittent. Speech is fluent. TP is linear and logical. No evident delusions or hallucinations. Denies SI/HI.      Assessment/Plan (functional improvement and/or ongoing impairment, response to treatment, plan for future ongoing treatment and medication management to include revisions): Continue Prozac 20 mg for now, will discuss possible taper next week,.        [x] NO NEW Medications at this time.   [] New Medication prescribed and prescription provided to patient  [] PHP Nurse notified of changes as needed    [x] Regarding any medications: patient instructed on purpose, benefits, side effects,   risks, and alternative treatments. Patient verbalizes understanding of instructions and has had the opportunity to ask questions.    Rachael Suggs MD  05/30/24   1:13 PM

## 2024-05-30 NOTE — GROUP NOTE
Group Therapy Note    Date: 5/30/2024    Group Start Time:  1:10 PM  Group End Time:  2:00 PM  Group Topic: Psychoeducation    [facility]    Nayana Barrett MSW        Group Therapy Note    Writer facilitated psychoeducation group focused on SCOTT for communication. Writer encouraged engagement through open ended, socratic questions and prompting pts to read aloud from the handout. Writer encouraged pts to ask questions.    Attendees: 7       Patient's Goal:  learning and applying SCOTT    Notes:  Pt presented as attentive and engaged as evidenced by spontaneous participation. Pt asked appropriate questions about the education and of peers. Pt discussed skepticism and concern about applying the skill to her relationship with her father. Pt presented with increased anxiety following verbalizing this. Pt will continue to work on learning and applying SCOTT.    Status After Intervention:  Decompensated    Participation Level: Active Listener and Interactive    Participation Quality: Appropriate and Attentive      Speech:  normal      Thought Process/Content: Logical      Affective Functioning: Congruent      Mood: anxious      Level of consciousness:  Alert, Oriented x4, and Attentive      Response to Learning: Able to verbalize current knowledge/experience and Progressing to goal      Endings: None Reported    Modes of Intervention: Education      Discipline Responsible: /Counselor      Signature:  MIESHA RAMÍREZ

## 2024-05-30 NOTE — GROUP NOTE
Group Therapy Note    Date: 5/30/2024    Group Start Time: 12:00 PM  Group End Time:  1:00 PM  Group Topic: Psychoeducation    [facility]    Sal Capellan LCSW        Group Therapy Note    The patient were provided prompts to write about identifying and processing triggers. The patients were encouraged to share triggers and answers to activity with peers.      Attendees: 7       Patient's Goal:  discuss and process triggers       Notes: The patient completed the journaling activity with peers. The patient participated in a group discussion on triggers with peers. The patient participated in group, but was distractable and playful, and did not share triggers. The patient will continue to identify and prepare to respond to triggers in the Trauma and grief group.     Status After Intervention:  Unchanged    Participation Level: Minimal    Participation Quality: Appropriate      Speech:  normal      Thought Process/Content: Logical      Affective Functioning: Exaggerated      Mood: euphoric      Level of consciousness:  Alert, Oriented x4, and Attentive      Response to Learning: Progressing to goal      Endings: None Reported    Modes of Intervention: Support and Activity      Discipline Responsible: /Counselor      Signature:  Sal Capellan LCSW

## 2024-06-04 ENCOUNTER — HOSPITAL ENCOUNTER (OUTPATIENT)
Facility: HOSPITAL | Age: 24
Setting detail: RECURRING SERIES
Discharge: HOME | End: 2024-06-07
Payer: MEDICAID

## 2024-06-04 VITALS
TEMPERATURE: 97.9 F | SYSTOLIC BLOOD PRESSURE: 114 MMHG | DIASTOLIC BLOOD PRESSURE: 88 MMHG | HEART RATE: 86 BPM | OXYGEN SATURATION: 100 %

## 2024-06-04 PROCEDURE — 90853 GROUP PSYCHOTHERAPY: CPT

## 2024-06-04 NOTE — GROUP NOTE
Group Therapy Note    Date: 6/4/2024    Group Start Time:  1:10 PM  Group End Time:  2:00 PM  Group Topic: Psychoeducation    [facility]    Tala Tanner MSW        Group Therapy Note    MIESHA Cedillo observe this group. Patients were given educational materials about EFT tapping. Patients were asked to participate in EFT tapping exercise. Patients were prompted to reflect on experience with tapping and other meditation activities.    Attendees: 6       Patient's Goal:  Engage in grounding/relaxation technique, reflect on exercise, identify coping skills     Notes:  The patient engaged minimally in psychoeducation group. The patient left group during tapping exercise. The patient returned to group and briefly reflected on previous experiences with tapping. The patient engaged minimally in group discussion. The patient engaged with peers and was receptive to redirection. The patient will continue to practice engaging in groups and using coping skills.    Status After Intervention:  Unchanged    Participation Level: Active Listener and Interactive    Participation Quality: Appropriate and Attentive      Speech:  normal      Thought Process/Content: Logical  Linear      Affective Functioning: Congruent      Mood: neutral      Level of consciousness:  Alert, Oriented x4, and Attentive      Response to Learning: Able to verbalize current knowledge/experience and Progressing to goal      Endings: None Reported    Modes of Intervention: Education and Activity      Discipline Responsible: /Counselor      Signature:  MIESHA Garrison

## 2024-06-04 NOTE — GROUP NOTE
Group Therapy Note    Date: 6/4/2024    Group Start Time:  2:00 PM  Group End Time:  2:45 PM  Group Topic: Wrap-Up    [facility]    Carina Garcia, MEISHA        Group Therapy Note    Attendees: 6    Writer facilitated wrap up group this afternoon. Writer opened by encouraging patients to fill out their wrap up sheets. Writer then provided patients with supplies and asked them to complete an activity where they tuyet their happy place. Once they'd had sufficient time, writer asked them to share what they had drawn. Once they had completed sharing this, writer asked them to share one thing they planned to do for self care later this afternoon.        Patient's Goal:  Participate in group therapy, complete check out sheet, engage in creative reflection activity, identify a self care item for this afternoon.     Notes:  Patient engaged fairly in group this afternoon. She filled out her check out sheet, declining any SI or HI. She identified lower levels of depression and anger and higher anxiety. Patient presented as playfully resistant. 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 Patient presented as playful and engaged with her peers and will continue with identified treatment goals in further groups.     Status After Intervention:  Unchanged    Participation Level: Active Listener and Interactive    Participation Quality: Appropriate and Attentive      Speech:  normal      Thought Process/Content: Logical      Affective Functioning: Congruent      Mood: euthymic      Level of consciousness:  Alert and Oriented x4      Response to Learning: Able to verbalize current knowledge/experience, Capable of insight, and Progressing to goal      Endings: None Reported    Modes of Intervention: Support, Socialization, and Exploration      Discipline

## 2024-06-04 NOTE — GROUP NOTE
Group Therapy Note    Date: 6/4/2024    Group Start Time:  9:00 AM  Group End Time:  9:40 AM  Group Topic: Community Meeting    [facility]    Nayana Barrett MSW        Group Therapy Note    Writer facilitated community group. Simona WHITESIDE shadowed for training. Writer assessed safety through check in forms. Writer encouraged pts to share about current stressors or sources of bubba. Writer encouraged peer feedback and provided relevant education.    Attendees: 9       Patient's Goal: disclosing safety concerns and engaging in peer discussion and support.     Notes:  Pt denied SI/TIRSO/HI on check in form. Pt did not share in group. Pt demonstrated attentiveness through nonverbal feedback and appropriate eye contact. Pt will continue to work on disclosing safety concerns and engaging in peer discussion and support.     Status After Intervention:  Unchanged    Participation Level: Active Listener    Participation Quality: Appropriate and Attentive      Speech:  normal      Thought Process/Content: Logical      Affective Functioning: Congruent      Mood: euthymic      Level of consciousness:  Alert, Oriented x4, and Attentive      Response to Learning: Progressing to goal      Endings: None Reported    Modes of Intervention: Support and Socialization      Discipline Responsible: /Counselor      Signature:  MIESHA RAMÍREZ

## 2024-06-04 NOTE — BH NOTE
Partial Hospitalization Program Individual Psychotherapy Note      Diagnosis: F43.10 Post traumatic stress disorder, F41.9 anxiety disorder     Goal: 1.1, utilize groups to learn mindfulness and CBT for anxiety management        Psychotherapy Session    Start time: 1050  Stop time: 1120        Patient Mental Status and Mood/Affect:Anxious and Congruent    Patient Behavior and Appearance: Cooperative and Poor eye contactshows no evidence of impairment    Intervention/Techniques: Informed, Validated/Supported, Challenged, Reframed, Listened/Empathized, Clarified, and Provided Feedback    Focus of Session/Patient Response and Progress Towards Goal: Writer met with pt for individual session, discharge discussion, and ITP review. Session focused on goal 1.1, utilize groups to learn mindfulness and CBT for anxiety management. Pt reported continued difficulty \"being in her head\" and identified concerns she would not be able to appropriately manage anxiety in the work setting due to a recent panic attack.    Narrative: Writer opened with explanation of the purpose of the session, to include discharge and ITP review. Writer asked pt how she has been feeling. Pt reported an overall positive weekend with multiple stressful experiences, to include an intense panic attack and someone violating her boundaries. Pt reported receiving significant healthy support from friends and speaking up for herself in the boundaries situation. Pt presented as receptive to feedback. Writer reviewed pt's d/c date of 6/14/24. Pt reported feeling comfortable with the d/c date. Writer and pt reviewed her ITP. Pt endorsed improvements in using deep breathing while acknowledging difficulty with self-love and thought challenging. Pt presented as emotional and periodically challenged the writer in discussion of validating her need for connection as well as acknowledging her negative core beliefs as understandable results of her childhood. Pt expressed

## 2024-06-04 NOTE — GROUP NOTE
Group Therapy Note    Date: 6/4/2024    Group Start Time: 9:40 AM  Group End Time: 10:30 AM  Group Topic: Process Group - Outpatient    [facility]    Sal Capellan LCSW        Group Therapy Note    This writer facilitated the process group. The patients were encouraged to share about any topics that they wanted to discuss. A patient shared updates about attending an event this weekend and experiencing a panic attack and how they were able to regulate with the help of support. The patient were provided a strengths identification activity and were encouraged to share responses to activity. The patients were encouraged to discuss empathy as a strength.  Group was facilitated from 0940 AM - 1030 AM, This writer made an error in noting the time.     Attendees: 6       Patient's Goal:  Self-disclose and discuss empathy       Notes: The patient shared experience over the weekend and accepted peer feedback.  The patient completed the strengths identification activity. The patient participated in a discussion on empathy. The patient will continue to self-disclose and discuss empathy in the process group.     Status After Intervention: unchanged     Status After Intervention:  Unchanged    Participation Level: Active Listener and Interactive    Participation Quality: Appropriate, Attentive, Sharing, and Supportive      Speech:  normal      Thought Process/Content: Logical      Affective Functioning: Congruent      Mood: euthymic      Level of consciousness:  Alert, Oriented x4, and Attentive      Response to Learning: Able to verbalize current knowledge/experience      Endings: None Reported    Modes of Intervention: Support, Socialization, and Activity      Discipline Responsible: /Counselor      Signature:  Sal Capellan LCSW

## 2024-06-04 NOTE — BH NOTE
OUTPATIENT PHYSICIAN PROGRESS NOTE      Chief Complaint/Symptoms/Impairments (as noted in Treatment Plan): \"It was an awesome weekend.\"    Criteria for Continued Treatment (check all that apply):   [x] Preventing Decompensation   [] Improving Level of Functioning  [] Reducing Isolative Behaviors  [] Understanding Diagnosis and Need for Medications  [] Improving Treatment/Medication Compliance  [x] Confronting Denial of Illness  [] Stabilizing Level of Functioning  [] Improving Emotional/Social/Cognitive Functioning  [] Decreasing Frequency of Hospitalizations    Suicidal/Homicidal ideations:   [x] Absent  [] Present  [] Passive  [] Intent  [] Plan  [] Death Wishes      CURRENT CONDITION OF PATIENT & PROGRESS ON TREATMENT PLAN    Subjective (ongoing complaints by patient regarding symptom severity, presentation, affect, function, etc.): 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      Behavior (side effects, changes in mental status, objective observations seen in individual sessions or by staff): Psychiatric:   Mood: euthymic  Affect: appropriate  Thoughts: logical  Speech: slowed  Sensorium: No A/V hallucinations  Safety: no SI/HI       Assessment/Plan (functional improvement and/or ongoing impairment, response to treatment, plan for future ongoing treatment and medication management to include revisions):   Increase Prozac to 30 mg        [x] NO NEW Medications at this time.   [] New Medication prescribed and prescription provided to patient  [] PHP Nurse notified of changes as needed    [x] Regarding any medications: patient instructed on purpose, benefits, side effects,   risks, and alternative treatments. Patient verbalizes understanding of instructions and has had the opportunity to ask questions.    Rachael Suggs MD  06/04/24   9:39 AM

## 2024-06-04 NOTE — GROUP NOTE
Group Therapy Note    Date: 6/4/2024    Group Start Time: 12:00 PM  Group End Time:  1:00 PM  Group Topic: Relapse Prevention    [facility]    Sal Capellan LCSW        Group Therapy Note    The patients were encouraged to complete a inner critic worksheet while outside. The patients were encouraged to share work with peers and discuss inner critic.     Attendees: 6       Patient's Patient's Goal: Journal entry on inner critic and self-disclose to peers.        Notes: The patient completed the journal entry activity on inner critic. The patient participated in discussion of journal entry with peers. The patient shared that her inner critic tells her she is a burden on others. The patient was open to peer feedback. The patient will continue to self-disclose and discuss inner criticism during the relapse prevention group.      Status After Intervention:  Unchanged    Participation Level: Active Listener and Interactive    Participation Quality: Appropriate, Attentive, Sharing, and Supportive      Speech:  normal      Thought Process/Content: Logical      Affective Functioning: Congruent      Mood: euthymic      Level of consciousness:  Alert, Oriented x4, and Attentive      Response to Learning: Able to verbalize current knowledge/experience, Capable of insight, and Progressing to goal      Endings: None Reported    Modes of Intervention: Support, Socialization, and Activity      Discipline Responsible: /Counselor      Signature:  Sal Capellan LCSW

## 2024-06-05 ENCOUNTER — HOSPITAL ENCOUNTER (OUTPATIENT)
Facility: HOSPITAL | Age: 24
Setting detail: RECURRING SERIES
Discharge: HOME | End: 2024-06-08
Payer: MEDICAID

## 2024-06-05 VITALS
OXYGEN SATURATION: 100 % | SYSTOLIC BLOOD PRESSURE: 93 MMHG | HEART RATE: 100 BPM | TEMPERATURE: 98.5 F | DIASTOLIC BLOOD PRESSURE: 72 MMHG

## 2024-06-05 PROCEDURE — 90853 GROUP PSYCHOTHERAPY: CPT

## 2024-06-05 NOTE — BH NOTE
OUTPATIENT PHYSICIAN PROGRESS NOTE      Chief Complaint/Symptoms/Impairments (as noted in Treatment Plan): DIA, PTSD    Criteria for Continued Treatment (check all that apply):   [x] Preventing Decompensation   [] Improving Level of Functioning  [] Reducing Isolative Behaviors  [] Understanding Diagnosis and Need for Medications  [] Improving Treatment/Medication Compliance  [] Confronting Denial of Illness  [] Stabilizing Level of Functioning  [x] Improving Emotional/Social/Cognitive Functioning  [] Decreasing Frequency of Hospitalizations    Suicidal/Homicidal ideations:   [x] Absent  [] Present  [] Passive  [] Intent  [] Plan  [] Death Wishes      CURRENT CONDITION OF PATIENT & PROGRESS ON TREATMENT PLAN    Subjective (ongoing complaints by patient regarding symptom severity, presentation, affect, function, etc.):  Kaylee Norris reports doing well with out medication concerns today.  Did however have to safety plan due to passive SI early in day.  Seems to manipulate   Managing well in the groups setting overall.  No aggression or violence.  Appropriately interactive and aware.  Tolerating medications well.  Appetite is fair.  Has a program she is in this weekend and does not want any major changes.        Behavior (side effects, changes in mental status, objective observations seen in individual sessions or by staff): Kaylee Berrios is calm cooperative, clear and coherent with speech of average rate volume and tone.  Moods are fair.  Affect is fair range. Appropriately dressed and groomed.  Denies SI/HI/AH/VH.  No aggression or violence.  Appropriately interactive and aware.  Insight is good and judgement is fair.      Assessment/Plan (functional improvement and/or ongoing impairment, response to treatment, plan for future ongoing treatment and medication management to include revisions):     Continue current care  Groups milieu and individual therapy  Medication modification as appropriate  Renew

## 2024-06-05 NOTE — GROUP NOTE
Group Therapy Note    Date: 6/5/2024    Group Start Time: 12:00 PM  Group End Time:  1:00 PM  Group Topic: Process Group - Outpatient    [facility]    Sal Capellan LCSW        Group Therapy Note    The patients were encouraged to write a journal entry about bubba, happiness, and positive memories.  The patients were encouraged to discuss journal entries     Attendees: 6       Patient's Goal:  Participate in exercise and self-disclose       Notes: The patient completed the exercise on self-reflection. The patient participated in discussion of answers. The patient declined to share journal entry, but participated in group discussion. The patient will continue to participate in activities during the process group.     Status After Intervention:  Unchanged    Participation Level: Active Listener and Interactive    Participation Quality: Appropriate, Attentive, and Supportive      Speech:  normal      Thought Process/Content: Logical      Affective Functioning: Congruent      Mood: euthymic      Level of consciousness:  Alert, Oriented x4, and Attentive      Response to Learning: Able to verbalize current knowledge/experience and Progressing to goal      Endings: None Reported    Modes of Intervention: Support, Socialization, and Activity      Discipline Responsible: /Counselor      Signature:  Sal Capellan LCSW

## 2024-06-05 NOTE — BH NOTE
PARTIAL HOSPITALIZATION PROGRAM CRISIS PLANNING    Kaylee Berrios  23 y.o.  [id number]        Patient reported: suicidal ideation on this date 6/5/2024 at this time 245 PM.     [place] Suicide Screen Completed: YES    Safety Plan Reviewed: YES    Safety Plan Updated/Changes Made: NO    Doctor Gerson notified via in person meeting at time, 3pm    Guardian notified: NA    Patient was able to safety plan.    911 contacted: NO    ECO petitioned: NO    Emergency/BSMARTDepartment notified: NO      Narrative: This writer met with the patient at 2:45 pm following the patient exiting the group room and stating that her live was not worth living any longer and that she was just going to end it all. During the group meeting.  The patient met with this writer in the office and was appropriate and participated in safety planning, and completed the [place] assessment.     The patient and this writer reviewed the patients safety plan and identified one actionable item in this moment to try. The patient texted her friend Katherine to call her when she had a chance and committed to treating herself to a treat from her identified \"[facility]\" [facility].  The patient informed this writer that she would continue to utilize her safety plan and would return the following day. The patient denied plan or intent to end her life at the end of the meeting.     Sal Capellan LCSW.

## 2024-06-05 NOTE — GROUP NOTE
Group Therapy Note    Date: 6/5/2024    Group Start Time: 10:40 AM  Group End Time: 11:30 AM  Group Topic: Cognitive Skills    [facility]    Tala Tanner MSW        Group Therapy Note    Patients were given a sheet with red flags and green lights for relationships. Patients were asked to discuss healthy and unhealthy relationships dynamics. Patients were asked to identify personal experiences with relationships. Patients were encouraged to identify ways to address red flags in relationships.    Attendees: 4       Patient's Goal:  Identify healthy and unhealthy relationship dynamics, identify communication skills    Notes:  The patient did not engage in group due to coming in late and missing a majority of group time. The patient briefly discussed feeling upset with group cohorts changing. The patient left group abruptly a few minutes early.    Status After Intervention:  N/A    Participation Level: N/A    Participation Quality: N/A      Speech:  normal      Thought Process/Content: Logical  Linear      Affective Functioning: Congruent      Mood: irritable      Level of consciousness:  Alert and Oriented x4      Response to Learning: N/A      Endings: None Reported    Modes of Intervention: Education and Exploration      Discipline Responsible: /Counselor      Signature:  MIESHA Garrison

## 2024-06-05 NOTE — GROUP NOTE
Group Therapy Note    Date: 6/5/2024    Group Start Time:  2:00 PM  Group End Time:  2:45 PM  Group Topic: Wrap-Up    [facility]    Sal Capellan LCSW        Group Therapy Note    The patients were encouraged to complete the afternoon assessment and to create an agenda prioritizing self-care. The patients were encouraged to share agendas with peers.     Attendees: 7       Patient's Goal: Identify mood and plan for self-care       Notes: The patient completed the wrap up assessment and denied SI/HI. The patient completed the afternoon agenda. The patient shared that she will make herself dinner and watch a show for self-care. The patient stated she was not worth living and anymore and doesn't matter on the way out of the door.  This writer met with patient following the group.   The patient will continue to identify mood and plan for self-care.     Status After Intervention:  Unchanged    Participation Level: Active Listener and Interactive    Participation Quality: Attentive and Sharing      Speech:  normal      Thought Process/Content: Logical      Affective Functioning: Congruent      Mood: euthymic      Level of consciousness:  Alert, Oriented x4, and Attentive      Response to Learning: Progressing to goal      Endings: Suicidality    Modes of Intervention: Support, Socialization, and Activity      Discipline Responsible: /Counselor      Signature:  Sal Capellan LCSW

## 2024-06-05 NOTE — GROUP NOTE
Group Therapy Note    Date: 6/5/2024    Group Start Time:  1:10 PM  Group End Time:  2:00 PM  Group Topic: Relapse Prevention    [facility]    Sal Capellan LCSW        Group Therapy Note    The patients were encouraged to listen to CPRS Joseph's presentation. The patients were encouraged to participate in discussion on anger following Joseph's presentation.      Attendees: 7       Patient's GGoal: Attend CPRS presentation.        Notes: The patient was present and attentive to CPRS Joseph. The patient participated in a discussion of anger. The patient will continue to be attentive to CPRS guest speakers during the recovery group.     Status After Intervention:  Unchanged    Participation Level: Active Listener    Participation Quality: Appropriate and Attentive      Speech:  normal      Thought Process/Content: Logical      Affective Functioning: Congruent      Mood: euthymic      Level of consciousness:  Alert, Oriented x4, and Attentive      Response to Learning: Progressing to goal      Endings: None Reported    Modes of Intervention: Education      Discipline Responsible: /Counselor      Signature:  Sal Capellan LCSW

## 2024-06-05 NOTE — BH NOTE
[id number] Writer notified pt she would get the pt's vitals. Pt stated she would need to use her inhaler first as she smoked too many cigarettes outside. Pt used her inhaler twice then sat for vitals. Pt presented as anxious and verbalized increased anxiety when the blood pressure cuff started to tighten. Pt then took off the oxygen monitor and blood pressure cuff before the vitals could be read. Pt apologized and said the cuff was making her hand tingle. Writer reflected that some circulation may be cut off leading to this sensation during a blood pressure check. Writer encouraged pt to engage in a grounding exercise before trying again. Pt initially refused then agreed and participated. Pt sat for the full vitals following this.     MIESHA Daley

## 2024-06-06 ENCOUNTER — HOSPITAL ENCOUNTER (OUTPATIENT)
Facility: HOSPITAL | Age: 24
Setting detail: RECURRING SERIES
Discharge: HOME | End: 2024-06-09
Payer: MEDICAID

## 2024-06-06 VITALS
OXYGEN SATURATION: 100 % | DIASTOLIC BLOOD PRESSURE: 84 MMHG | TEMPERATURE: 97.6 F | HEART RATE: 78 BPM | SYSTOLIC BLOOD PRESSURE: 109 MMHG

## 2024-06-06 PROCEDURE — 90853 GROUP PSYCHOTHERAPY: CPT

## 2024-06-06 RX ORDER — ESCITALOPRAM OXALATE 5 MG/1
5 TABLET ORAL DAILY
Qty: 30 TABLET | Refills: 0 | Status: SHIPPED | OUTPATIENT
Start: 2024-06-06

## 2024-06-06 RX ORDER — FLUOXETINE 10 MG/1
10 CAPSULE ORAL DAILY
Qty: 30 CAPSULE | Refills: 0 | OUTPATIENT
Start: 2024-06-06

## 2024-06-06 NOTE — GROUP NOTE
Group Therapy Note    Date: 6/6/2024    Group Start Time: 10:30 AM  Group End Time: 11:40 AM  Group Topic: Cognitive Skills    [facility]    Nayana Barrett MSW        Group Therapy Note    Writer facilitated cognitive skills group. Writer opened with brief introductions for a new peer. Writer transitioned to providing education on cognitive distortions and core negative beliefs. Writer provided a handout on types of distortions and encouraged pts to connect the information to their lived experiences.    Attendees: 6       Patient's Goal:  learning to identify cognitive distortions.    Notes:  Pt presented as attentive and engaged as evidenced by appropriate eye contact and spontaneous participation. Pt participated in peer introductions. Pt shared about her own experiences with cognitive distortions. Pt will continue to work on learning to identify cognitive distortions.    Status After Intervention:  Unchanged    Participation Level: Active Listener and Interactive    Participation Quality: Appropriate, Attentive, and Sharing      Speech:  normal      Thought Process/Content: Logical      Affective Functioning: Congruent      Mood: euthymic      Level of consciousness:  Alert, Oriented x4, and Attentive      Response to Learning: Able to verbalize current knowledge/experience and Progressing to goal      Endings: None Reported    Modes of Intervention: Education      Discipline Responsible: /Counselor      Signature:  MIESHA RAMÍREZ

## 2024-06-06 NOTE — GROUP NOTE
Group Therapy Note    Date: 6/6/2024    Group Start Time:  2:00 PM  Group End Time:  2:45 PM  Group Topic: Wrap-Up    [facility]    Nayana Barrett MSW        Group Therapy Note    Writer facilitated wrap up group. Writer assessed safety through wrap up forms. Writer facilitated a game-based coping skills and mental health concept identification game. Writer encouraged participation through soliciting feedback and guiding turns.     Attendees: 5       Patient's Goal:   disclosing safety concerns and reviewing psychoeducation    Notes:  Pt denied SI and HI. Pt presented as engaged as evidenced by following game instructions. Pt interacted playfully and appropriately with peers. Pt successfully identified journaling. Pt presented as fidgety and engaged in distracting behaviors in the last few minutes of groups. When prompted to change behavior, pt stated \"I hate myself\" and stepped out. Writer checked on pt, who presented as uncomfortable and utilizing a coping skill. Pt gave writer a thumbs up. Pt rejoined group. Pt will continue to work on disclosing safety concerns and reviewing psychoeducation.    Status After Intervention:  Unchanged    Participation Level: Active Listener and Interactive    Participation Quality: Appropriate and Attentive      Speech:  normal      Thought Process/Content: Logical      Affective Functioning: Congruent      Mood: euthymic, anxious      Level of consciousness:  Alert, Oriented x4, and Attentive      Response to Learning: Able to verbalize current knowledge/experience and Progressing to goal      Endings: None Reported    Modes of Intervention: Activity      Discipline Responsible: /Counselor      Signature:  MIESHA RAMÍREZ

## 2024-06-06 NOTE — BH NOTE
OUTPATIENT PHYSICIAN PROGRESS NOTE      Chief Complaint/Symptoms/Impairments (as noted in Treatment Plan): \"I want to change medications, the jaw clenching is worse\"    Criteria for Continued Treatment (check all that apply):   [x] Preventing Decompensation   [] Improving Level of Functioning  [] Reducing Isolative Behaviors  [] Understanding Diagnosis and Need for Medications  [x] Improving Treatment/Medication Compliance  [] Confronting Denial of Illness  [] Stabilizing Level of Functioning  [] Improving Emotional/Social/Cognitive Functioning  [] Decreasing Frequency of Hospitalizations    Suicidal/Homicidal ideations:   [] Absent  [] Present  [] Passive  [] Intent  [] Plan  [] Death Wishes      CURRENT CONDITION OF PATIENT & PROGRESS ON TREATMENT PLAN    Subjective (ongoing complaints by patient regarding symptom severity, presentation, affect, function, etc.): Reports increased jaw clenching. Would like to change to a different antidepressant, \"I'm really depressed, it's not been this bad before.' Anxiety continues to have been improved.      Behavior (side effects, changes in mental status, objective observations seen in individual sessions or by staff): EC intact. Speech WNL. TP linear, logical. Mood \"depressed\" affect congruent. Denies SI or HI.      Assessment/Plan (functional improvement and/or ongoing impairment, response to treatment, plan for future ongoing treatment and medication management to include revisions):   Taper Prozac to 10 mg daily  Will start Lexapro 5 mg daily on Monday 6/10        [x] NO NEW Medications at this time.   [] New Medication prescribed and prescription provided to patient  [] PHP Nurse notified of changes as needed    [x] Regarding any medications: patient instructed on purpose, benefits, side effects,   risks, and alternative treatments. Patient verbalizes understanding of instructions and has had the opportunity to ask questions.    Rachael Suggs MD  06/06/24   11:01

## 2024-06-06 NOTE — GROUP NOTE
Group Therapy Note    Date: 6/6/2024    Group Start Time: 12:00 PM  Group End Time:  1:00 PM  Group Topic: Psychoeducation    [facility]    Nayana Barrett MSW        Group Therapy Note    Writer facilitated psychoeducation group focused on skills practice and anger education. Writer opened with a movement based gratitude game to promote practice of gratitude. Writer transitioned to providing education on the anger cycle and anger iceberg. Writer encouraged pts to relate the information to their daily lives.    Attendees: 5       Patient's Goal:  practicing coping and understanding anger.    Notes:  Pt presented as attentive and engaged as evidenced by spontaneous participation. Pt engaged appropriately and playfully in the gratitude game and interacted appropriately with peers. Pt made appropriate eye contact during education. Pt will continue to work on practicing coping and understanding anger.    Status After Intervention:  Unchanged    Participation Level: Active Listener and Interactive    Participation Quality: Appropriate, Attentive      Speech:  normal      Thought Process/Content: Logical      Affective Functioning: Congruent      Mood: euthymic      Level of consciousness:  Alert, Oriented x4, and Attentive      Response to Learning: Able to verbalize current knowledge/experience and Progressing to goal      Endings: None Reported    Modes of Intervention: Education and Activity      Discipline Responsible: /Counselor    Signature:  MIESHA RAMÍREZ

## 2024-06-06 NOTE — GROUP NOTE
Group Therapy Note    Date: 6/6/2024    Group Start Time:  9:40 AM  Group End Time: 10:30 AM  Group Topic: Process Group - Outpatient    [facility]    Sal Capellan LCSW        Group Therapy Note    The patients were encouraged to complete a positivity exercise asking them to reflect on a past accomplishment. The patients were asked to share accomplishments with peers and discuss.     Attendees: 5       Patient's Goal: Self-disclose, provide and accept feedback.        Notes: The patient completed the positive reflection exercise. The patient shared accomplishments with peers and discussed. The patient shared that her accomplishment was the relationships she has formed in her [facility] community. The patient expressed difficulty identifying the accomplishment.  The patient will continue to self-disclose and accept feedback during the process group.     Status After Intervention:  Unchanged    Participation Level: Active Listener and Interactive    Participation Quality: Appropriate, Attentive, Sharing, and Supportive      Speech:  normal      Thought Process/Content: Logical      Affective Functioning: Congruent      Mood: euthymic      Level of consciousness:  Alert, Oriented x4, and Attentive      Response to Learning: Able to verbalize current knowledge/experience and Progressing to goal      Endings: None Reported    Modes of Intervention: Support, Socialization, and Activity      Discipline Responsible: /Counselor      Signature:  Sal Capellan LCSW

## 2024-06-06 NOTE — BH NOTE
1732 Writer pulled pt due to pt requesting to speak earlier. Pt stated she was going to start a new medication on Monday and asked to be able to extend to be monitored on the medication. Writer agreed. Pt discussed recent depression symptoms. Writer provided psychoeducation. Pt presented as receptive. Pt expressed low self-esteem and exhibited several cognitive distortions. Writer utilized playful rapport with reminders of recent psychoeducation on cognitive distortions to support pt building insight into these patterns. Pt expressed being receptive. Writer provided pt a handout on ways to challenge cognitive distortions and provided education on the benefits. Pt expressed understanding and agreed to try the skills on the handout.     MIESHA Daley

## 2024-06-07 ENCOUNTER — HOSPITAL ENCOUNTER (OUTPATIENT)
Facility: HOSPITAL | Age: 24
Setting detail: RECURRING SERIES
Discharge: HOME | End: 2024-06-10
Payer: MEDICAID

## 2024-06-07 VITALS
OXYGEN SATURATION: 100 % | TEMPERATURE: 98.1 F | DIASTOLIC BLOOD PRESSURE: 76 MMHG | SYSTOLIC BLOOD PRESSURE: 105 MMHG | HEART RATE: 77 BPM

## 2024-06-07 PROCEDURE — 90853 GROUP PSYCHOTHERAPY: CPT

## 2024-06-07 NOTE — GROUP NOTE
Group Therapy Note    Date: 6/7/2024    Group Start Time:  1:10 PM  Group End Time:  2:00 PM  Group Topic: Psychoeducation    [facility]    Nayana Barrett MSW        Group Therapy Note    Writer facilitated psychoeducation group. Writer brought handouts for thought record education. Writer opened with providing space for a pt to continue processing, as the pt was presently engaged in processing when writer arrived. Writer utilized reflective listening, open ended questions, and boundary setting to help the topic, which focused heavily on the intersection of Denominational, spirituality, values, and authenticity, within therapeutic boundaries.     Attendees: 6       Patient's Goal:  engaging in peer feedback and support around topics of values and authenticity    Notes:  Pt presented as attentive and engaged as evidenced by spontaneous participation. Pt provided supportive feedback while endorsing relating to frustrations expressed by peers regarding Yazidism communities that align with her personal values. Pt provided gentle feedback and challenging to a peer. Pt will continue working on engaging in peer feedback and support around topics of values and authenticity.    Status After Intervention:  Unchanged    Participation Level: Active Listener and Interactive    Participation Quality: Appropriate, Attentive, Sharing, and Supportive      Speech:  normal      Thought Process/Content: Logical      Affective Functioning: Congruent      Mood: euthymic      Level of consciousness:  Alert, Oriented x4, and Attentive      Response to Learning: Able to verbalize current knowledge/experience, Capable of insight, and Progressing to goal      Endings: None Reported    Modes of Intervention: Support and Exploration      Discipline Responsible: /Counselor      Signature:  MIESHA RAMÍREZ

## 2024-06-07 NOTE — GROUP NOTE
Group Therapy Note    Date: 6/7/2024    Group Start Time:  9:41 AM  Group End Time: 10:31 AM  Group Topic: Process Group - Outpatient    [facility]    Nayana Barrett MSW        Group Therapy Note    Writer facilitated process group using a structured approach. Writer prompted pts to write gratitude letters to one of the following: themselves for getting through difficult times, a loved one, a support, or a role model. Writer encouraged pts to share about their letters and prompted peer feedback. Writer closed with brief education on the benefits of gratitude.    Attendees: 6       Patient's Goal:  practicing gratitude    Notes:  Pt presented as attentive as evidenced by being observed to complete the letter. Pt presented as quiet with appropriate eye contact. Pt will continue to work on practicing gratitude.    Status After Intervention:  Unchanged    Participation Level: Active Listener    Participation Quality: Appropriate and Attentive      Speech:  normal      Thought Process/Content: Logical      Affective Functioning: Congruent      Mood: euthymic      Level of consciousness:  Alert, Oriented x4, and Attentive      Response to Learning: Progressing to goal      Endings: None Reported    Modes of Intervention: Support and Activity      Discipline Responsible: /Counselor      Signature:  MIESHA RAMÍREZ

## 2024-06-07 NOTE — GROUP NOTE
Group Therapy Note    Date: 6/7/2024    Group Start Time: 12:00 PM  Group End Time:  1:00 PM  Group Topic: Relapse Prevention    [facility]    Nayana Barrett MSW        Group Therapy Note    Writer facilitated relapse prevention group. Writer opened by providing space for a pt to share about trauma per pt's request to share. Writer facilitated peer feedback and support, to include setting gentle boundaries to remain on topic. Writer transitioned to education on urges and facilitated discussion of the experience of urges among peers. Writer provided feedback and challenging to pts exhibiting cognitive distortions.    Attendees: 6     Patient's Goal:  understanding urges     Notes:  Pt presented as attentive and engaged as evidenced by spontaneous participation. Pt provided nonverbal feedback and demonstrated appropriate eye contact. Pt provided supportive feedback and empathetic challenging to a peer. Pt will continue to work on understanding urges.    Status After Intervention:  Unchanged    Participation Level: Active Listener and Interactive    Participation Quality: Appropriate, Attentive, and Sharing      Speech:  normal      Thought Process/Content: Logical      Affective Functioning: Congruent      Mood: euthymic      Level of consciousness:  Alert, Oriented x4, and Attentive      Response to Learning: Able to verbalize current knowledge/experience and Progressing to goal      Endings: None Reported    Modes of Intervention: Education and Support      Discipline Responsible: /Counselor      Signature:  MIESHA RAMÍREZ

## 2024-06-07 NOTE — GROUP NOTE
Group Therapy Note    Date: 6/7/2024    Group Start Time:  2:00 PM  Group End Time:  2:45 PM  Group Topic: Wrap-Up    [facility]    Carina Garcia MSW; Simona Gonzalez MSW        Group Therapy Note    Attendees: 7    Writer facilitated wrap up group this afternoon with co-facilitator MIESHA Cedillo. Writer opened by having patients fill out their check out sheets and providing them with safety plans. Writer provided brief education about safety plans and asked for patients to make corrections if needed. During this process, the topic of the previous group was brought up and was continued, resulting in a conflict and significant processing, which continued until the end of group.        Patient's Goal:   Participate in group therapy, complete check out sheet, process with peers.     Notes:  Patient engaged well in group this afternoon. She filled out her check out sheet, declining any SI or HI. She provided verbal support and gentle challenging for her peer was processing. At one point, patient left the group to regulate, but did return and continue to participate in the discussion. Patient interacted well with peers and will continue with identified treatment goals in further groups.     Status After Intervention:  Unchanged    Participation Level: Active Listener and Interactive    Participation Quality: Appropriate, Attentive, and Sharing      Speech:  normal      Thought Process/Content: Logical      Affective Functioning: Congruent      Mood: anxious      Level of consciousness:  Alert and Oriented x4      Response to Learning: Able to verbalize current knowledge/experience, Capable of insight, and Progressing to goal      Endings: None Reported    Modes of Intervention: Support and Exploration      Discipline Responsible: /Counselor      Signature:  MIESHA Perez

## 2024-06-07 NOTE — GROUP NOTE
Group Therapy Note    Date: 6/7/2024    Group Start Time: 10:40 AM  Group End Time: 11:30 AM  Group Topic: Cognitive Skills    [facility]    Sal Capellan LCSW        Group Therapy Note    This writer facilitated the cognitive skills group. The patients were provided information on boundary circles and encouraged to complete a reflection exercise on personal boundaries. The patients were encouraged to share answers with peers.     Attendees: 6       Patient's Goal: Reflect on boundaries.        Notes: The patient was open to information on setting boundaries and completed the boundary [place] exercise. The patient shared details of boundary exercise with peers. The patient shared support for peers.  The patient will continue to reflect on boundaries in the cognitive skills group.     Status After Intervention:  Unchanged    Participation Level: Active Listener and Interactive    Participation Quality: Appropriate, Attentive, Sharing, and Supportive      Speech:  normal      Thought Process/Content: Logical      Affective Functioning: Congruent      Mood: euthymic      Level of consciousness:  Alert, Oriented x4, and Attentive      Response to Learning: Able to verbalize current knowledge/experience, Capable of insight, and Progressing to goal      Endings: None Reported    Modes of Intervention: Support, Socialization, and Activity      Discipline Responsible: /Counselor      Signature:  Sal Capellan LCSW

## 2024-06-08 ENCOUNTER — HOSPITAL ENCOUNTER (EMERGENCY)
Facility: HOSPITAL | Age: 24
Discharge: LEFT BEFORE DISCHARGE | End: 2024-06-08

## 2024-06-08 VITALS
BODY MASS INDEX: 22.89 KG/M2 | OXYGEN SATURATION: 100 % | RESPIRATION RATE: 16 BRPM | SYSTOLIC BLOOD PRESSURE: 112 MMHG | WEIGHT: 121.25 LBS | HEIGHT: 61 IN | TEMPERATURE: 98.5 F | DIASTOLIC BLOOD PRESSURE: 73 MMHG

## 2024-06-09 NOTE — ED TRIAGE NOTES
Patient arrives with complaint of shortness of breath x 1 day. Patient states its the same as her past visits 'but a little bit worse\". Patient is speaking in full and clear sentences.

## 2024-06-10 ENCOUNTER — HOSPITAL ENCOUNTER (OUTPATIENT)
Facility: HOSPITAL | Age: 24
Setting detail: RECURRING SERIES
Discharge: HOME | End: 2024-06-13
Payer: MEDICAID

## 2024-06-10 VITALS
OXYGEN SATURATION: 98 % | SYSTOLIC BLOOD PRESSURE: 120 MMHG | HEART RATE: 84 BPM | TEMPERATURE: 98.3 F | DIASTOLIC BLOOD PRESSURE: 86 MMHG

## 2024-06-10 PROCEDURE — 90853 GROUP PSYCHOTHERAPY: CPT

## 2024-06-10 NOTE — GROUP NOTE
Group Therapy Note    Date: 6/10/2024    Group Start Time:  9:00 AM  Group End Time:  9:40 AM  Group Topic: Community Meeting    [facility]    Sal Capellan LCSW        Group Therapy Note    This writer facilitated the community group. The patients were encouraged to     Attendees: 7       Patient's Goal:  Identify mood and self-disclose       Notes: The patient completed the check in assessment and denied SI/HI. The patient participated in a group discussion of the previous evening and supported a peer who disclosed. The patient shared that she went to the emergency department over the weekend due to anxiety.   The patient will continue to identify mood and self-disclose during the Community group.     Status After Intervention:  Unchanged    Participation Level: Active Listener and Interactive    Participation Quality: Appropriate, Attentive, Sharing, and Supportive      Speech:  normal      Thought Process/Content: Logical      Affective Functioning: Congruent      Mood: euthymic      Level of consciousness:  Alert, Oriented x4, and Attentive      Response to Learning: Able to verbalize current knowledge/experience, Capable of insight, and Progressing to goal      Endings: None Reported    Modes of Intervention: Support, Socialization, and Exploration      Discipline Responsible: /Counselor      Signature:  Sal Capellan LCSW

## 2024-06-10 NOTE — GROUP NOTE
Group Therapy Note    Date: 6/10/2024    Group Start Time:  2:00 PM  Group End Time:  2:45 PM  Group Topic: Wrap-Up    [facility]    Simona Gonzalez MSW; Carina Garcia MSW        Group Therapy Note    Writer facilitated group with the intention of reviewing self-care activity. Writer continued processing of previous group with patients and engaged in active and reflective listening as all group members discussed conversation from previous group. Writer provided positive feedback to all group members for engaging in healthy conflict resolution with one another before beginning self care activity. Writer facilitated conversation among group members revolving around individual self-care practices.  Attendees: 6       Patient's Goal:  Engage in group and participate in self-care activity.    Notes:  Patient participated in positive reassurance of peer. Patient was able to provide positive feedback and display sympathy for her peer. Patient participated in the self-care activity and discussed several self-care activities that she engages in. Patient was observed having positive interpersonal interactions among peers.    Status After Intervention:  Improved    Participation Level: Active Listener and Interactive    Participation Quality: Appropriate, Attentive, and Supportive      Speech:  normal      Thought Process/Content: Logical      Affective Functioning: Congruent      Mood: elevated and euphoric      Level of consciousness:  Alert, Oriented x4, and Attentive      Response to Learning: Able to verbalize current knowledge/experience, Able to verbalize/acknowledge new learning, Able to retain information, Capable of insight, and Progressing to goal      Endings: None Reported    Modes of Intervention: Support, Socialization, and Activity      Discipline Responsible: /Counselor      Signature:  MIESHA Cedillo

## 2024-06-10 NOTE — GROUP NOTE
Group Therapy Note    Date: 6/10/2024    Group Start Time:  1:15 PM  Group End Time:  2:00 PM  Group Topic: Psychoeducation    [facility]    Nayana Barrett MSW        Group Therapy Note    Writer facilitated psychoeducation group. Writer opened with asking peers if they were willing to process and discuss the events of Friday's wrap up group in which multiple pts walked out in response to what a peer shared. Writer utilized open ended questions, clarifying questions, reflective listening, therapeutic reflections, and challenging to support peers in sharing and processing. Writer redirected behaviors as needed.    Attendees: 6       Patient's Goal:  practicing healthy communication and processing a group rapport issue    Notes:  Pt presented as attentive and engaged as evidenced by spontaneous participation. Pt provided a peer supportive feedback as well as gentle challenging. Pt utilized her own experiences to normalize relevant barriers and symptoms. Pt will continue to work on practicing healthy communication and processing a group rapport issue.    Status After Intervention:  Unchanged    Participation Level: Active Listener and Interactive    Participation Quality: Appropriate, Attentive, Sharing, and Supportive      Speech:  normal      Thought Process/Content: Logical      Affective Functioning: Congruent      Mood: euthymic      Level of consciousness:  Alert, Oriented x4, and Attentive      Response to Learning: Able to verbalize current knowledge/experience, Capable of insight, and Progressing to goal      Endings: None Reported    Modes of Intervention: Support, Confrontation, Limit-setting, and Reality-testing      Discipline Responsible: /Counselor      Signature:  MIESHA RAMÍREZ

## 2024-06-10 NOTE — GROUP NOTE
Group Therapy Note    Date: 6/10/2024    Group Start Time:  9:40 AM  Group End Time: 10:30 AM  Group Topic: Process Group - Outpatient    [facility]    Nayana Barrett MSW        Group Therapy Note    Writer facilitated process group. Writer opened by offering space for pts to share about their concerns, successes, and stressors. Writer transitioned to facilitating discussion using reflection prompts, to include identifying what a person is trying to control that they are unable to control as well as what values each person holds. Writer encouraged peer feedback through open ended prompts.    Attendees: 8       Patient's Goal:  practicing self-reflection and engaging with peer support    Notes:  Pt presented as attentive and engaged as evidenced by spontaneous participation. Pt endorsed relating to a peer regarding difficult swallowing pills. Pt provided feedback to peers and encouraged a peer to share. Pt will continue to work on practicing self-reflection and engaging with peer support.    Status After Intervention:  Unchanged    Participation Level: Active Listener and Interactive    Participation Quality: Appropriate, Attentive, and Sharing      Speech:  normal      Thought Process/Content: Logical      Affective Functioning: Congruent      Mood: euthymic      Level of consciousness:  Alert, Oriented x4, and Attentive      Response to Learning: Able to verbalize current knowledge/experience and Progressing to goal      Endings: None Reported    Modes of Intervention: Support and Socialization      Discipline Responsible: /Counselor      Signature:  MIESHA RAMÍREZ

## 2024-06-10 NOTE — GROUP NOTE
Group Therapy Note    Date: 6/10/2024    Group Start Time: 12:00 PM  Group End Time:  1:00 PM  Group Topic: Process Group - Outpatient    [facility]    Sal Capellan LCSW        Group Therapy Note    This writer facilitated the treatment planning group. The patient requested to discuss family relationships for the group. This writer facilitated conversation.     Attendees: 6       Patient's Goal:  Self-disclose, participate in conversation       Notes: The patient participated in conversation on family relationships. The patient shared encouragement to peers discussing family relationships. The patient stepped out of group to regulate and returned in appropriate amount of time.   The patient will continue to participate in discussion on family relationships during the treatment planning group.     Status After Intervention:  Unchanged    Participation Level: Active Listener    Participation Quality: Appropriate, Attentive, Sharing, and Supportive      Speech:  normal      Thought Process/Content: Logical      Affective Functioning: Congruent      Mood: anxious      Level of consciousness:  Alert, Oriented x4, and Attentive      Response to Learning: Able to verbalize current knowledge/experience and Progressing to goal      Endings: None Reported    Modes of Intervention: Support, Socialization, and Activity      Discipline Responsible: /Counselor      Signature:  Sal Capellan LCSW

## 2024-06-10 NOTE — GROUP NOTE
Group Therapy Note    Date: 6/10/2024    Group Start Time: 10:40 AM  Group End Time: 11:30 AM  Group Topic: Cognitive Skills    [facility]    Carina Garcia MSW; Simona Gonzalez MSW        Group Therapy Note    Attendees: 6    Writer facilitated cognitive skills group this morning. Writer opened by checking in with patients, then moving into psychoeducation about the relationship between thoughts, feelings, and actions and how that can inform our conflict resolution skills. Writer framed this as related to a difficult group at the end of last week which contained conflict. Writer then asked patients to read out loud through the Fair Fighting Rules sheet, and facilitated sharing related to healthy conflict until the end of group.        Patient's Goal:  Participate in group therapy, build understanding of cognitive theory and conflict resolution.     Notes:  Patient engaged well in group this morning. She presented as engaged and interactive, asking insightful questions for her peers. She connected well with a peer over the topic of conflict with a parent who doesn't fight fair. Patient interacted well with peers and will continue with identified treatment goals in further groups.     Status After Intervention:  Improved    Participation Level: Active Listener and Interactive    Participation Quality: Appropriate, Attentive, and Sharing      Speech:  normal      Thought Process/Content: Logical      Affective Functioning: Congruent      Mood: euthymic      Level of consciousness:  Alert and Oriented x4      Response to Learning: Able to verbalize current knowledge/experience, Capable of insight, and Progressing to goal      Endings: None Reported    Modes of Intervention: Education, Exploration, and Clarifying      Discipline Responsible: /Counselor      Signature:  MIESHA Perez

## 2024-06-11 ENCOUNTER — HOSPITAL ENCOUNTER (OUTPATIENT)
Facility: HOSPITAL | Age: 24
Setting detail: RECURRING SERIES
Discharge: HOME | End: 2024-06-14
Payer: MEDICAID

## 2024-06-11 VITALS
HEART RATE: 83 BPM | SYSTOLIC BLOOD PRESSURE: 125 MMHG | DIASTOLIC BLOOD PRESSURE: 89 MMHG | OXYGEN SATURATION: 100 % | TEMPERATURE: 98.2 F

## 2024-06-11 PROCEDURE — 90832 PSYTX W PT 30 MINUTES: CPT

## 2024-06-11 PROCEDURE — 90853 GROUP PSYCHOTHERAPY: CPT

## 2024-06-11 NOTE — GROUP NOTE
Group Therapy Note    Date: 6/11/2024    Group Start Time:  2:00 PM  Group End Time:  2:45 PM  Group Topic: Wrap-Up    [facility]    Nayana Barrett MSW        Group Therapy Note    Writer facilitated a community wrap up group focused on assessing for safety and coping skills practice. Writer facilitated a symptom and safety check in using the afternoon check in form. Writer facilitated brief education on positive affirmations and prompted pts to identify and share their own.     Attendees: 8       Patient's Goal:  disclosing safety concerns and practicing positive affirmations    Notes:  Pt denied SI/HI on wrap up form. Pt presented as engaged evidenced by spontaneous participation. Pt shared her affirmations and provided feedback to peers. Pt will continue to work on disclosing safety concerns and practicing positive affirmations.    Status After Intervention:  Unchanged    Participation Level: Active Listener and Interactive    Participation Quality: Appropriate, Attentive, and Sharing      Speech:  normal      Thought Process/Content: Logical      Affective Functioning: Congruent      Mood: euthymic      Level of consciousness:  Alert, Oriented x4, and Attentive      Response to Learning: Able to verbalize current knowledge/experience and Progressing to goal      Endings: None Reported    Modes of Intervention: Activity      Discipline Responsible: /Counselor      Signature:  MIESHA RAMÍREZ

## 2024-06-11 NOTE — GROUP NOTE
Group Therapy Note    Date: 6/11/2024    Group Start Time: 12:00 PM  Group End Time:  1:00 PM  Group Topic: Psychotherapy    [facility]    Simona Gonzalez, MIESHA; Carina Garcia, MSW        Group Therapy Note    Writer provided instructional material for progressive muscle relaxation techniques for the group. Writer encouraged patients to engage in the activity and facilitated reflections on the technique following the demonstration. Writer provided positive reassurance to patients as they shared experiences relevant to coping strategies, stress reduction, and anxiety reduction.    Attendees: 6       Patient's Goal:  Engage in group and participate in group activity.    Notes:  Patient discussed her current environment and a sense of lack of control in her home. Patient was receptive to feedback from peers regarding boundaries that she could make in her environment with those she resides with as well as things that may help her achieve a sense of control in her environment. Patient reported that she \"liked the idea of a progressive muscle relaxation exercise\" and would be likely to complete it at home, yet reported she was unable to focus and complete the exercise in the group environment. Patient was an active participant in group dialogue with peers. Patient will participate in future groups    Status After Intervention:  Improved    Participation Level: Active Listener and Interactive    Participation Quality: Appropriate, Attentive, Sharing, and Supportive      Speech:  normal      Thought Process/Content: Logical  Linear      Affective Functioning: Congruent      Mood: elevated and euphoric      Level of consciousness:  Alert, Oriented x4, and Attentive      Response to Learning: Able to verbalize current knowledge/experience, Able to verbalize/acknowledge new learning, Able to retain information, Capable of insight, and Progressing to

## 2024-06-11 NOTE — BH NOTE
Partial Hospitalization Program Individual Psychotherapy Note      Diagnosis: F43.10 Post traumatic stress disorder, F41.9 anxiety disorder     Goal: 1.2, monitor medications        Psychotherapy Session    Start time: 1330  Stop time: 1400        Patient Mental Status and Mood/Affect:Anxious and Congruent    Patient Behavior and Appearance: Cooperative and Good eye contactshows no evidence of impairment    Intervention/Techniques: Informed, Validated/Supported, Listened/Empathized, Provided Feedback, and Evaluated/Interpreted    Focus of Session/Patient Response and Progress Towards Goal: Writer met with pt for individual session, ITP review, and discharge discussion. Session focused on goal 1.2, monitor medications. Pt presented with progress as evidenced by discussing advocacy and concerns around medications.    Narrative: Writer opened with review of session purpose as well as check in. Pt reported feeling stressed but able to manage. Pt identified stressors. Pt reported the recommendation from Dr Suggs to stop her medications for 2 days to rule out the medications as a cause of her jaw clenching. Pt reported not wanting to extend further. Pt identified medical anxiety as a barrier. Writer provided validation as well as challenging, to include reminding pt that she has the knowledge of what she needs to do and anxiety cycle education reminder. Pt presented as receptive. Pt identified skills and solutions to try. Pt and writer reviewed the ITP. Pt identified progress and areas for continued support. Pt signed ITP appropriately.     Pt's current d/c date is anticipated to be 6/21/2024.    MIESHA Daley

## 2024-06-11 NOTE — GROUP NOTE
Group Therapy Note    Date: 6/11/2024    Group Start Time:  9:00 AM  Group End Time:  9:40 AM  Group Topic: Community Meeting    [facility]    Sal Capellan LCSW        Group Therapy Note    This writer facilitated the community group. The patients were encouraged to completed the daily assessment. The patients were encouraged to discuss events since the last time they were at [facility]. The patients were encouraged to commit to practicing two coping skills one while in PHP and one in the afternoon following PHP.      Attendees: 5       Patient's Goal:  Identify mood     Notes:  The patient arrived to group at 9:38 and was unable to participate. The patient completed the morning assessment. The patient will continue to identify mood during the community group.     Status After Intervention:  Unchanged    Participation Level: None    Participation Quality: Appropriate      Speech:  normal      Thought Process/Content: Logical      Affective Functioning: Congruent      Mood: euthymic      Level of consciousness:  Alert and Oriented x4      Response to Learning: Progressing to goal      Endings: None Reported    Modes of Intervention: Socialization      Discipline Responsible: /Counselor      Signature:  Sal Capellan LCSW

## 2024-06-11 NOTE — GROUP NOTE
Group Therapy Note    Date: 6/11/2024    Group Start Time: 10:40 AM  Group End Time: 11:30 AM  Group Topic: Cognitive Skills    [facility]    Nayana Barrett MSW        Group Therapy Note    Writer facilitated cognitive skills group and brought in education handouts. Writer opened with peer introductions for a new pt joining group, to include an ice breaker question. Writer transitioned to providing space for a peer to share about a traumatic event and encouraged peer feedback.    Attendees: 8       Patient's Goal:  building group rapport and processing trauma.    Notes:  Pt presented as attentive as evidenced by appropriate eye contact. Pt engaged appropriately in the peer introductions. Pt was observed to step out briefly to regulate and return. Pt did not engage in discussion. Pt will continue to work on building group rapport and processing trauma.    Status After Intervention:  Unchanged    Participation Level: Active Listener    Participation Quality: Appropriate and Attentive      Speech:  normal      Thought Process/Content: Logical      Affective Functioning: Congruent      Mood: anxious      Level of consciousness:  Alert, Oriented x4, and Attentive      Response to Learning: Able to verbalize current knowledge/experience and Progressing to goal      Endings: None Reported    Modes of Intervention: Support and Socialization      Discipline Responsible: /Counselor      Signature:  MIESHA RAMÍREZ

## 2024-06-11 NOTE — BH NOTE
OUTPATIENT PHYSICIAN PROGRESS NOTE      Chief Complaint/Symptoms/Impairments (as noted in Treatment Plan): DIA, PTSD    Criteria for Continued Treatment (check all that apply):   [x] Preventing Decompensation   [] Improving Level of Functioning  [] Reducing Isolative Behaviors  [] Understanding Diagnosis and Need for Medications  [] Improving Treatment/Medication Compliance  [] Confronting Denial of Illness  [] Stabilizing Level of Functioning  [x] Improving Emotional/Social/Cognitive Functioning  [] Decreasing Frequency of Hospitalizations    Suicidal/Homicidal ideations:   [x] Absent  [] Present  [] Passive  [] Intent  [] Plan  [] Death Wishes      CURRENT CONDITION OF PATIENT & PROGRESS ON TREATMENT PLAN    Subjective (ongoing complaints by patient regarding symptom severity, presentation, affect, function, etc.):  Kaylee Norris reports doing well with medication.  Managing well in the groups setting overall.  No aggression or violence.  Appropriately interactive and aware.  Tolerating medications well.  Appetite is fair.         Behavior (side effects, changes in mental status, objective observations seen in individual sessions or by staff): Kaylee Berrios is calm cooperative, clear and coherent with speech of average rate volume and tone.  Moods are fair.  Affect is fair range. Appropriately dressed and groomed.  Denies SI/HI/AH/VH.  No aggression or violence.  Appropriately interactive and aware.  Insight is good and judgement is fair.      Assessment/Plan (functional improvement and/or ongoing impairment, response to treatment, plan for future ongoing treatment and medication management to include revisions):     Continue current care  Groups milieu and individual therapy  Medication modification as appropriate  Renew medications as appropriate  Disposition planning with social work          [x] NO NEW Medications at this time.   [] New Medication prescribed and prescription provided to patient  [x]

## 2024-06-11 NOTE — GROUP NOTE
Group Therapy Note    Date: 6/11/2024    Group Start Time:  9:40 AM  Group End Time: 10:30 AM  Group Topic: Process Group - Outpatient    [facility]    Carina Garcia MSW        Group Therapy Note    Attendees: 7    Writer facilitated process group this morning. Writer opened by asking patients if there was anything they wanted to process with the group. Several folks did, so writer facilitated open processing and sharing until the end of group.        Patient's Goal:  Participate in group therapy, engage in open sharing and processing with peers.     Notes:  Patient engaged well in group this morning. She shared about how she was feeling anxious about changing medications and having fears about her medication side affects. Patient initially was receptive to feedback, but did express frustration and stepped out of group to regulate for a few minutes. When she returned, she continued to engage well with peers and writer. Patient will continue with identified treatment goals in further groups.     Status After Intervention:  Unchanged    Participation Level: Active Listener and Interactive    Participation Quality: Appropriate, Attentive, and Sharing      Speech:  normal      Thought Process/Content: Logical      Affective Functioning: Congruent      Mood: anxious      Level of consciousness:  Alert and Oriented x4      Response to Learning: Able to verbalize current knowledge/experience, Capable of insight, and Progressing to goal      Endings: None Reported    Modes of Intervention: Support, Socialization, and Exploration      Discipline Responsible: /Counselor      Signature:  MIESHA Perez

## 2024-06-11 NOTE — BH NOTE
OUTPATIENT PHYSICIAN PROGRESS NOTE      Chief Complaint/Symptoms/Impairments (as noted in Treatment Plan): \"I'm nervous about the meds, I started Lexapro yesterday and I feel like I'm clenching my jaw\"    Criteria for Continued Treatment (check all that apply):   [x] Preventing Decompensation   [] Improving Level of Functioning  [] Reducing Isolative Behaviors  [] Understanding Diagnosis and Need for Medications  [] Improving Treatment/Medication Compliance  [x] Confronting Denial of Illness  [x] Stabilizing Level of Functioning  [] Improving Emotional/Social/Cognitive Functioning  [] Decreasing Frequency of Hospitalizations    Suicidal/Homicidal ideations:   [x] Absent  [] Present  [] Passive  [] Intent  [] Plan  [] Death Wishes      CURRENT CONDITION OF PATIENT & PROGRESS ON TREATMENT PLAN    Subjective (ongoing complaints by patient regarding symptom severity, presentation, affect, function, etc.): She stopped Prozac on Friday, started one dose of Lexapro and reported feeling jaw clenching; states \"I think it's in my mind\" but continues to report discomfort. Has been doing well without panic attacks recently; discussion of SSRIs vs other meds for anxiety such as SNRIs.      Behavior (side effects, changes in mental status, objective observations seen in individual sessions or by staff): EC good, speech fluent. Affect full. TP perseverative on somatic sx. Denies SI or HI.      Assessment/Plan (functional improvement and/or ongoing impairment, response to treatment, plan for future ongoing treatment and medication management to include revisions):   Hold Lexapro 5 mg until follow up Thursday to see if somatic sx resolve  Consider SNRI        [x] NO NEW Medications at this time.   [] New Medication prescribed and prescription provided to patient  [] PHP Nurse notified of changes as needed    [x] Regarding any medications: patient instructed on purpose, benefits, side effects,   risks, and alternative treatments.

## 2024-06-11 NOTE — GROUP NOTE
Group Therapy Note    Date: 6/11/2024    Group Start Time:  1:10 PM  Group End Time:  2:00 PM  Group Topic: Psychoeducation    [facility]    Tala Tanner MSW        Group Therapy Note    Patients were given educational materials about grounding techniques using the five senses. Patients were asked to discuss benefits and purposes for grounding techniques. Patients were asked to participate in a grounding exercise. Patients were given a sheet prompting them to track coping skills and triggers to find things that work for them.    Attendees: 7       Patient's Goal:  Discuss grounding skills, practice grounding, identify coping skills and triggers    Notes:  The patient engaged minimally in psychoeducation group due to meeting with a therapist for a majority of group time. While in group, the patient offered feedback and support to peers related to the benefits of grounding, and examples of techniques using the senses.    Status After Intervention:  N/A    Participation Level: Minimal    Participation Quality: Appropriate and Attentive      Speech:  normal      Thought Process/Content: Logical  Linear      Affective Functioning: Congruent      Mood: euthymic      Level of consciousness:  Alert, Oriented x4, and Attentive      Response to Learning: Progressing to goal      Endings: None Reported    Modes of Intervention: Education and Activity      Discipline Responsible: /Counselor      Signature:  MIESHA Garrison

## 2024-06-12 ENCOUNTER — HOSPITAL ENCOUNTER (OUTPATIENT)
Facility: HOSPITAL | Age: 24
Setting detail: RECURRING SERIES
Discharge: HOME | End: 2024-06-15
Payer: MEDICAID

## 2024-06-12 VITALS
SYSTOLIC BLOOD PRESSURE: 132 MMHG | TEMPERATURE: 96.5 F | HEART RATE: 75 BPM | DIASTOLIC BLOOD PRESSURE: 96 MMHG | OXYGEN SATURATION: 100 %

## 2024-06-12 PROCEDURE — 90853 GROUP PSYCHOTHERAPY: CPT

## 2024-06-12 NOTE — GROUP NOTE
Group Therapy Note    Date: 6/12/2024    Group Start Time:  9:40 AM  Group End Time: 10:30 AM  Group Topic: Process Group - Outpatient    [facility]    Carina Garcia MSW        Group Therapy Note    Attendees: 7    Writer facilitated process group this morning. Patients were already processing from previous group, and writer assured them that they could continue. Writer facilitated sharing and processing until the end of group.        Patient's Goal:  Participate in group therapy, engage in open sharing and processing with peers.     Notes:  Patient engaged well in group this morning. She provided good, insightful feedback for a peer who was processing about health anxiety, and shared her own experience in response. Patient noted that her panic attacks had been becoming less frequent recently. She interacted well with peers and will continue with identified treatment goals in further groups.     Status After Intervention:  Improved    Participation Level: Active Listener and Interactive    Participation Quality: Appropriate, Attentive, and Sharing      Speech:  normal      Thought Process/Content: Logical      Affective Functioning: Congruent      Mood: anxious      Level of consciousness:  Alert and Oriented x4      Response to Learning: Able to verbalize current knowledge/experience, Capable of insight, and Progressing to goal      Endings: None Reported    Modes of Intervention: Support, Socialization, and Exploration      Discipline Responsible: /Counselor      Signature:  MIESHA Perez

## 2024-06-12 NOTE — GROUP NOTE
Group Therapy Note    Date: 6/12/2024    Group Start Time:  1:10 PM  Group End Time:  2:00 PM  Group Topic: Psychoeducation    [facility]    Simona Gonzalez MSW; Sal Capellan LCSW        Group Therapy Note    Writer facilitated Psychoeducational group pertaining to internal cognitions. Writer accompanied the group outside for therapeutic scenery when completing a \"Brain Dump\" activity. Writer facilitated therapeutic sharing among group members. Writer assisted group members in challenging cognitions that several group members conveyed as well as encouraging peer feedback. Writer provided positive reassurance to all group members for sharing within the group setting.     Attendees: 8       Patient's Goal:  Engage in group and participate in \"brain dump\" activity.    Notes:  Patient endorsed high anxiety levels. Patient was observed leaving group several times to take breaks. Patient engaged in deep breathing exercises during the course of group. Patient was not very participatory in group but was attentive as other group members shared. Patient was observed to be actively listening as peers described their responses to the activity. Patient will participate in future groups.    Status After Intervention:  Unchanged    Participation Level: Minimal    Participation Quality: Attentive      Speech:  N/A      Thought Process/Content: N/A      Affective Functioning: Congruent      Mood: anxious      Level of consciousness:  Preoccupied      Response to Learning: Able to retain information and Progressing to goal      Endings: None Reported    Modes of Intervention: Support, Socialization, and Activity      Discipline Responsible: /Counselor      Signature:  MIESHA Cedillo

## 2024-06-12 NOTE — GROUP NOTE
Group Therapy Note    Date: 6/12/2024    Group Start Time: 12:00 PM  Group End Time:  1:00 PM  Group Topic: Psychoeducation    [facility]    Tala Tanner MSW        Group Therapy Note    Patients were given educational materials about how trauma works in the brain, trauma responses, and trauma symptoms in the body. Patients were asked to discuss experiences with trauma responses and ways to understand behaviors. Patients were asked to identify physical symptoms of trauma.    Attendees: 7       Patient's Goal:  Understand trauma in the brain, discuss trauma responses, identify physical symptoms    Notes:  The patient did not engage in psychoeducation group due to being out of group for a majority of group time. The patient briefly discussed hving stopped her medications and feeling a regression in symptoms.    Status After Intervention:  N/A    Participation Level: N/A    Participation Quality: N/A      Speech:  normal      Thought Process/Content: N/A      Affective Functioning: N/A      Mood: N/A      Level of consciousness:  N/A      Response to Learning: N/A      Endings: N/A    Modes of Intervention: N/A      Discipline Responsible: /Counselor      Signature:  MIESHA Garrison

## 2024-06-12 NOTE — GROUP NOTE
Group Therapy Note    Date: 6/12/2024    Group Start Time: 10:40 AM  Group End Time: 11:30 AM  Group Topic: Cognitive Skills    [facility]    Sal Capellan LCSW        Group Therapy Note    This writer facilitated the cognitive skills group. The patients initiated a discussion on vulnerability and asking for help. This writer encouraged and mediated the discussion.     Attendees: 7       Patient's Goal: Self-disclose and participate in discussion       Notes: The patient participated in a group discussion on vulnerability and asking for help. The patient shared an anecdote about needing help while at a festival and having to accept help that others identified she needed. The patient will continue to discuss vulnerability and ask and accept help in the cognitive skills group.     Status After Intervention:  Unchanged    Participation Level: Active Listener and Interactive    Participation Quality: Appropriate, Attentive, Sharing, and Supportive      Speech:  normal      Thought Process/Content: Logical      Affective Functioning: Congruent      Mood: euthymic      Level of consciousness:  Alert, Oriented x4, and Attentive      Response to Learning: Able to verbalize current knowledge/experience, Capable of insight, and Progressing to goal      Endings: None Reported    Modes of Intervention: Support, Socialization, and Exploration      Discipline Responsible: /Counselor      Signature:  Sal Capellan LCSW

## 2024-06-12 NOTE — BH NOTE
6/12/2024    1210 patient asked to speak with writer, which writer agreed to. Simona WHITESIDE was also present. Patient processed about her medication changes and having had a panic attack prior to coming into writer's office. She presented as receptive to feedback and encouragement from both writer and Simona, and presented as more regulated following conversation. Patient left writer's office at 1225 and returned to group.     1330 patient came to speak with writer again, presenting as anxious and dysregulated. Patient shared about wanting to go home, but was receptive to feedback and redirection from writer. Writer and patient worked through coping skills, including DBT grounding skill GLAMICHAEL. Writer and patient then went out for a walk together to regulate and patient returned to group at 1350.     MIESHA Perez

## 2024-06-12 NOTE — GROUP NOTE
Group Therapy Note    Date: 6/12/2024    Group Start Time:  9:00 AM  Group End Time:  9:40 AM  Group Topic: Community Meeting    [facility]    Simona Gonzalez MSW; Carina Garcia MSW        Group Therapy Note    Writer facilitated community group and inquired about current goals for the day as well as a check-in of how group members were feelings. Writer encouraged peer discussion and feedback as the group progressed. Writer encouraged open sharing of goals among peers and provided positive feedback and reassurance to group members.    Attendees: 6       Patient's Goal:  Engage in group and set goals for the day.     Notes:  Patient reported high levels of anxiety this morning and reported that she was \"doing okay\" otherwise. Patient was observed to relate to a peer by self reporting that she \"understood\" how the peer felt regarding avoiding hospitalizations. Patient completed check-in sheet and set her goal for the day. Patient will participate in future groups.    Status After Intervention:  Unchanged    Participation Level: Active Listener and Interactive    Participation Quality: Appropriate, Attentive, Sharing, and Supportive      Speech:  normal      Thought Process/Content: Logical  Linear      Affective Functioning: Congruent      Mood: euthymic      Level of consciousness:  Alert, Oriented x4, and Attentive      Response to Learning: Able to verbalize current knowledge/experience, Able to retain information, and Progressing to goal      Endings: None Reported    Modes of Intervention: Support, Socialization, and Activity      Discipline Responsible: /Counselor      Signature:  MIESHA Cedillo

## 2024-06-12 NOTE — GROUP NOTE
Group Therapy Note    Date: 6/12/2024    Group Start Time:  2:00 PM  Group End Time:  2:45 PM  Group Topic: Wrap-Up    [facility]    Carina Garcia MSW        Group Therapy Note    Attendees: 7    Writer facilitated wrap up group this afternoon. Writer opened by encouraging patients to fill out their wrap up sheets. Patients continued sharing from previous group, and writer facilitated rapport building. Writer facilitated two patients sharing about job interviews and the associated anxiety, and then moved into the activity, which was a self esteem worksheet. Writer then facilitated sharing until the end of group.        Patient's Goal:  Participate in group therapy, complete wrap up sheet, practice building self esteem.     Notes:  Patient engaged well in group this afternoon. She filled out her check out sheet, declining any SI or HI. She provided good verbal support to her peers who were sharing, and shared from her self esteem worksheet that her best qualities are her honesty, being a good friend, and being an empath. Patient interacted well with peers and will continue with identified treatment goals in further groups.     Status After Intervention:  Improved    Participation Level: Active Listener and Interactive    Participation Quality: Appropriate, Attentive, and Sharing      Speech:  normal      Thought Process/Content: Logical      Affective Functioning: Congruent      Mood: anxious      Level of consciousness:  Alert and Oriented x4      Response to Learning: Able to verbalize current knowledge/experience, Capable of insight, and Progressing to goal      Endings: None Reported    Modes of Intervention: Support, Socialization, and Exploration      Discipline Responsible: /Counselor      Signature:  MIESHA Perez

## 2024-06-13 ENCOUNTER — HOSPITAL ENCOUNTER (OUTPATIENT)
Facility: HOSPITAL | Age: 24
Setting detail: RECURRING SERIES
Discharge: HOME | End: 2024-06-16
Payer: MEDICAID

## 2024-06-13 VITALS
TEMPERATURE: 98.1 F | DIASTOLIC BLOOD PRESSURE: 82 MMHG | HEART RATE: 74 BPM | SYSTOLIC BLOOD PRESSURE: 134 MMHG | OXYGEN SATURATION: 100 %

## 2024-06-13 PROCEDURE — 90853 GROUP PSYCHOTHERAPY: CPT

## 2024-06-13 NOTE — GROUP NOTE
Group Therapy Note    Date: 6/13/2024    Group Start Time:  2:00 PM  Group End Time:  2:45 PM  Group Topic: Wrap-Up    [facility]    Simona Gonzalez MSW; Carina Garcia MSW        Group Therapy Note    Writer facilitated wrap-up group and guided group through EFT meditation. Writer encouraged participation among group members and facilitated healthy dialogue between members. Writer provided positive feedback for group when sharing as appropriate.    Attendees: 8       Patient's Goal:  Engage in group and complete wrap up sheet.     Notes:  Patient was observed engaging in positive dialogue with peers. Patient was observed utilizing the meditation as it was displayed and provided feedback regarding what works for her and what does not. Patient was observed to be respectful as other peers were talking and maintained attentiveness throughout group. Patient completed check out sheet. Patient will participate in future groups.     Status After Intervention:  Improved    Participation Level: Active Listener and Interactive    Participation Quality: Appropriate, Attentive, and Sharing      Speech:  normal      Thought Process/Content: Logical      Affective Functioning: Congruent      Mood: elevated and euphoric      Level of consciousness:  Alert, Oriented x4, and Attentive      Response to Learning: Able to verbalize current knowledge/experience, Capable of insight, and Progressing to goal      Endings: None Reported    Modes of Intervention: Support, Socialization, and Activity      Discipline Responsible: /Counselor      Signature:  MIESHA Cedillo

## 2024-06-13 NOTE — GROUP NOTE
Group Therapy Note    Date: 6/13/2024    Group Start Time: 12:00 PM  Group End Time:  1:00 PM  Group Topic: Psychotherapy    [facility]    Carina Garcia MSW        Group Therapy Note    Attendees: 7    Writer facilitated psychotherapy group centered around boundaries this afternoon. When writer started group, a patient processed briefly about meeting with a friend this evening and being unsure of the relationship, which writer used as a segue into the topic of boundaries. Writer did psychoeducation on pointers for setting boundaries, then facilitated discussions about examples until the end of group.        Patient's Goal:  Participate in group therapy, build understanding of how to set boundaries and why they are important in relationships.     Notes:  Patient engaged well in group this afternoon. She shared about a situation that she would like to set boundaries in with a person at work who has a position of power over her. Patient presented as receptive to feedback and encouragement from peers and from writer. Patient will continue with identified treatment goals in further groups.     Status After Intervention:  Improved    Participation Level: Active Listener and Interactive    Participation Quality: Appropriate      Speech:  normal      Thought Process/Content: Logical      Affective Functioning: Congruent      Mood: anxious      Level of consciousness:  Alert and Oriented x4      Response to Learning: Able to verbalize current knowledge/experience, Capable of insight, and Progressing to goal      Endings: None Reported    Modes of Intervention: Education, Exploration, and Clarifying      Discipline Responsible: /Counselor      Signature:  MIESHA Perez

## 2024-06-13 NOTE — GROUP NOTE
Group Therapy Note    Date: 6/13/2024    Group Start Time: 10:40 AM  Group End Time: 11:30 AM  Group Topic: Cognitive Skills    [facility]    Sal Capellan LCSW        Group Therapy Note    This writer facilitated the cognitive skills group. The patients were asked if anything from the previous group needed to be finished. Multiple patients became escalated and this writer utilized group time to process escalation and conflict from previous group. This writer encouraged the patient to participate in a ice breaker activity to welcome a new peer.     Attendees: 7       Patient's Goal:  process conflict, welcome a new peer       Notes: The patient participated in discussion of events at the start of the group with this writer and peers. The patient entered the group and was triggered by a patient discussing recent events. The patient was encouraged to sit and listen, but requested to leave in order to regulate.  The patient participated in an ice breaker exercise to welcome a new peer. The patient will continue to be willing to process conflict in a group.     Status After Intervention:  Unchanged    Participation Level: Active Listener and Interactive    Participation Quality: Appropriate, Attentive, Sharing, and Supportive      Speech:  normal      Thought Process/Content: Logical      Affective Functioning: Congruent      Mood: euthymic      Level of consciousness:  Alert, Oriented x4, and Attentive      Response to Learning: Able to verbalize current knowledge/experience, Capable of insight, and Progressing to goal      Endings: None Reported    Modes of Intervention: Support, Socialization, and Activity      Discipline Responsible: /Counselor      Signature:  Sal Capellan LCSW

## 2024-06-13 NOTE — GROUP NOTE
Group Therapy Note    Date: 6/13/2024    Group Start Time:  9:40 AM  Group End Time: 10:30 AM  Group Topic: Process Group - Outpatient    [facility]    Carina Garcia, MIESHA        Group Therapy Note    Attendees: 7    Writer facilitated process group this morning. Writer opened by asking patients if there was anything anyone would like to process. A patient brought up body dysmorphia, and writer facilitated discussion on this topic for the majority of group. Toward the end of group, some interpersonal conflict was also processed between two of the patients, which also continued into the following group.        Patient's Goal:  Participate in group therapy, engage in open sharing and processing with peers.     Notes:  Patient engaged well in group this morning. She shared about her own experience with insecurities and body image, and provided good feedback for a peer who was processing about the same. Toward the end of group, patient attempted to process however was interrupted by another peer twice-- patient presented at this point as frustrated and left the room telling the other patient to \"shut up, just shut the fuck up\". Writer pulled patient to discuss following this group--see  note for details. Patient will continue with identified treatment goals in further groups.     Status After Intervention:  Decompensated    Participation Level: Active Listener and Interactive    Participation Quality: Appropriate, Attentive, and Sharing      Speech:  normal      Thought Process/Content: Logical      Affective Functioning: Congruent      Mood: anxious      Level of consciousness:  Alert and Oriented x4      Response to Learning: Able to verbalize current knowledge/experience, Capable of insight, and Progressing to goal      Endings: None Reported    Modes of Intervention: Support, Socialization, and Exploration      Discipline Responsible: Social

## 2024-06-13 NOTE — BH NOTE
Pt came to writer at 10:20 stating she feels frustrated with a peer due to comments an jokes made while group members are sharing. Pt reported sharing about a current stressor and a peer making a comment hat made her upset. Writer validated pt feelings. Pt reported stepping out briefly to regulate feelings of anger, and that she \"just wanted to bitch about it\" before returning to group. The patient returned to group at 10:21.    Pt left group at 10:22 verbalizing frustration at further comments made by peer upon pt returning to group and speaking about the stressor. Pt reported needing to take a break outside and left the PHP suite.    MIESHA Garrison

## 2024-06-13 NOTE — BH NOTE
1320 Pt asked to speak with writer. Pt stated she had already spoken to Carina WHITESIDE and Dr Suggs about this but wanted to discuss with writer as well. Pt reported wanting to remain off all her medications except for her PRN anxiety medication. Pt discussed negative impact of the side effects of the other medications and preference to only use the PRN for her panic attacks. Pt stated she would remain in contact with her psychiatrist so she can resume taking other medications if needed. Writer affirmed pt's autonomy in making this decision for herself. Writer stated if pt were to continue with this choice that pt needs to make a comprehensive coping plan for her panic attacks to support returning to work. Pt agreed and discussed her intentions for returning to work. Pt identified barriers and presented as receptive to problem solving support. Pt endorsed optimism for leaving PHP to work on practical solutions to change her life.     MIESHA Daley

## 2024-06-13 NOTE — GROUP NOTE
Group Therapy Note    Date: 6/13/2024    Group Start Time:  1:10 PM  Group End Time:  2:00 PM  Group Topic: Psychoeducation    [facility]    Tala Tanner MSW        Group Therapy Note    Patients were given educational materials about forgiveness. Patients were asked to identify experiences with forgiveness, what it looks like, and how they are able to do it. Patients were encouraged to discuss barriers and difficulties with forgiveness.    Attendees: 9       Patient's Goal:  Discuss and describe forgiveness, identify emotions and experiences, identify barriers to forgiveness     Notes:  The patient engaged minimally in psychoeducation group. The patient made verbal comments of agreement with peers sharing. The patient appeared attentive to group discussion. The patient will continue to practice engaging in groups and processing.    Status After Intervention:  Unchanged    Participation Level: Active Listener and Interactive    Participation Quality: Appropriate and Attentive      Speech:  normal      Thought Process/Content: Logical  Linear      Affective Functioning: Congruent      Mood: euthymic      Level of consciousness:  Alert, Oriented x4, and Attentive      Response to Learning: Able to verbalize current knowledge/experience and Progressing to goal      Endings: None Reported    Modes of Intervention: Education and Support      Discipline Responsible: /Counselor      Signature:  MIESHA Garrison

## 2024-06-13 NOTE — GROUP NOTE
Group Therapy Note    Date: 6/13/2024    Group Start Time:  9:00 AM  Group End Time:  9:40 AM  Group Topic: Community Meeting    [facility]    Carina Garcia MSW        Group Therapy Note    Attendees: 6    Writer facilitated community check in group this morning. Writer opened by encouraging patients to complete their check in sheets. Writer then facilitated patients checking in with each other, and processing about frustrations with driving, transportation, and traffic.        Patient's Goal:  Participate in group therapy, complete check in sheet, engage in building rapport with peers.     Notes:  Patient engaged well in group this morning. She completed her check in sheet, declining any SI or thoughts of self harm. She identified low depression and anger and high anxiety. Patient shared about her prized antique find, which were two cannons that her and her friends found for a relatively cheap price at an antique store. Patient interacted well with peers and will continue with identified treatment goals in further groups.     Status After Intervention:  Improved    Participation Level: Active Listener and Interactive    Participation Quality: Appropriate, Attentive, and Sharing      Speech:  normal      Thought Process/Content: Logical      Affective Functioning: Congruent      Mood: euthymic      Level of consciousness:  Alert and Oriented x4      Response to Learning: Able to verbalize current knowledge/experience, Capable of insight, and Progressing to goal      Endings: None Reported    Modes of Intervention: Support, Socialization, and Exploration      Discipline Responsible: /Counselor      Signature:  MIESHA Perez

## 2024-06-13 NOTE — BH NOTE
OUTPATIENT PHYSICIAN PROGRESS NOTE      Chief Complaint/Symptoms/Impairments (as noted in Treatment Plan): \"I'm gonna get kicked out, I told another group member to 'shut the f up' for interrupting me because he was interrupting me\"    Criteria for Continued Treatment (check all that apply):   [x] Preventing Decompensation   [] Improving Level of Functioning  [] Reducing Isolative Behaviors  [] Understanding Diagnosis and Need for Medications  [] Improving Treatment/Medication Compliance  [] Confronting Denial of Illness  [x] Stabilizing Level of Functioning  [] Improving Emotional/Social/Cognitive Functioning  [] Decreasing Frequency of Hospitalizations    Suicidal/Homicidal ideations:   [x] Absent  [] Present  [] Passive  [] Intent  [] Plan  [] Death Wishes      CURRENT CONDITION OF PATIENT & PROGRESS ON TREATMENT PLAN    Subjective (ongoing complaints by patient regarding symptom severity, presentation, affect, function, etc.): She reports a panic attack yesterday, still does not want to take a different antidepressant. Having some conflict with another group member.      Behavior (side effects, changes in mental status, objective observations seen in individual sessions or by staff):   Psychiatric:   Mood: anxious  Affect: appropriate  Thoughts: logical  Speech: pressured  Sensorium: No A/V hallucinations  Safety: no SI/HI        Assessment/Plan (functional improvement and/or ongoing impairment, response to treatment, plan for future ongoing treatment and medication management to include revisions):     At this time pt prefers to stay off of medication, she is still reporting grinding teeth/jaw clenching        [x] NO NEW Medications at this time.   [] New Medication prescribed and prescription provided to patient  [] PHP Nurse notified of changes as needed    [x] Regarding any medications: patient instructed on purpose, benefits, side effects,   risks, and alternative treatments. Patient verbalizes

## 2024-06-13 NOTE — BH NOTE
6/13/2024    10:40 Writer pulled patient to discuss conflict during process group. Patient expressed feeling remorseful that she had sworn at her peer and expressed frustration that she had been trying to process something but had been interrupted twice. Writer emphasized importance of following the code of conduct, and patient indicated understanding. She expressed intention to apologize to peer for frustration.     11:00 Writer spoke with patient again  after patient had returned to group then left again feeling dysregulated. Patient agreed readily to process with writer and that she was frustrated that peers were still processing the conflict. Writer provided active listening and reflection, and allowed patient the space to process about a friend caught in a shark attack recently. Patient presented as receptive to interventions suggested by writer (including taking a breath next time before leaving group, ways to advocate for her need to process in the future). Patient returned to group at 11:10am.     MIESHA Perez

## 2024-06-14 ENCOUNTER — HOSPITAL ENCOUNTER (OUTPATIENT)
Facility: HOSPITAL | Age: 24
Setting detail: RECURRING SERIES
Discharge: HOME | End: 2024-06-17
Payer: MEDICAID

## 2024-06-14 VITALS
DIASTOLIC BLOOD PRESSURE: 88 MMHG | HEART RATE: 95 BPM | SYSTOLIC BLOOD PRESSURE: 129 MMHG | TEMPERATURE: 98.3 F | OXYGEN SATURATION: 100 %

## 2024-06-14 PROCEDURE — 90853 GROUP PSYCHOTHERAPY: CPT

## 2024-06-14 NOTE — GROUP NOTE
Group Therapy Note    Date: 6/14/2024    Group Start Time:  2:00 PM  Group End Time:  2:45 PM  Group Topic: Wrap-Up    [facility]    Sal Capellan LCSW        Group Therapy Note    The patients were encouraged to complete a wrap up assessment to identify mood and disclose any safety issues. The patients were encouraged to writer a support postcard to a discharging peer. The patients were encouraged to shared plans for self-care over the weekend.     Attendees: 9       Patient's Goal:  Identify mood and support peers       Notes: The patient completed the afternoon wrap up assessment and denied SI/HI. The patients completed exercise to support discharging peer. The patient shared she will treat herself to a meal and prepare to help her father paint and install a new floor this weekend. The patient will continue to identify mood and support peers during the wrap up group.     Status After Intervention:  Unchanged    Participation Level: Active Listener and Interactive    Participation Quality: Appropriate, Attentive, Sharing, and Supportive      Speech:  normal      Thought Process/Content: Logical      Affective Functioning: Congruent      Mood: euthymic      Level of consciousness:  Alert, Oriented x4, and Attentive      Response to Learning: Able to verbalize current knowledge/experience      Endings: None Reported    Modes of Intervention: Support, Socialization, and Activity      Discipline Responsible: /Counselor      Signature:  Sal Capellan LCSW

## 2024-06-14 NOTE — GROUP NOTE
Group Therapy Note    Date: 6/14/2024    Group Start Time: 10:40 AM  Group End Time: 11:30 AM  Group Topic: Cognitive Skills    [facility]    Nayana Barrett MSW        Group Therapy Note    Writer facilitated cognitive skills group with a focus on building insight into how patterns of behavior developed in childhood within families can affect later behavior. Writer opened with a reflective prompt on the subject for pts to journal about. Writer encouraged pts to share and provide feedback based on the journaling.    Attendees: 9       Patient's Goal:  processing how childhood family dynamics can impact present behavior    Notes:  Pt presented as attentive and engaged as evidenced by spontaneous participation. Pt was observed to complete the journaling. Pt provided appropriate feedback and discussed relating to a peer. Pt will continue to work on processing how childhood family dynamics can impact present behavior.    Status After Intervention:  Unchanged    Participation Level: Active Listener and Interactive    Participation Quality: Appropriate, Attentive, Sharing, and Supportive      Speech:  normal      Thought Process/Content: Logical      Affective Functioning: Congruent      Mood: euthymic      Level of consciousness:  Alert, Oriented x4, and Attentive      Response to Learning: Able to verbalize current knowledge/experience and Progressing to goal      Endings: None Reported    Modes of Intervention: Exploration      Discipline Responsible: /Counselor      Signature:  MIESHA RAMÍREZ

## 2024-06-14 NOTE — GROUP NOTE
Group Therapy Note    Date: 6/14/2024    Group Start Time:  9:00 AM  Group End Time:  9:40 AM  Group Topic: Community Meeting    [facility]    Sal Capellan LCSW        Group Therapy Note    This writer facilitated the community group. The patients were encouraged to complete the check in assessment. The patients were encouraged to discuss goals with peers.     Attendees: 7       Patient's Goal: Identify mood and discuss goals with peers.        Notes: The patient identifies mood and denied SI/HI. The patient participated in goal identification discussion with peers. The patient will continue to identify mood and discuss goals with peers.     Status After Intervention:  Unchanged    Participation Level: Active Listener and Interactive    Participation Quality: Appropriate, Attentive, Sharing, and Supportive      Speech:  normal      Thought Process/Content: Logical      Affective Functioning: Congruent      Mood: euthymic      Level of consciousness:  Alert, Oriented x4, and Attentive      Response to Learning: Able to verbalize current knowledge/experience, Capable of insight, and Progressing to goal      Endings: None Reported    Modes of Intervention: Support, Socialization, and Exploration      Discipline Responsible: /Counselor      Signature:  Sal Capellan LCSW

## 2024-06-14 NOTE — GROUP NOTE
Group Therapy Note    Date: 6/14/2024    Group Start Time: 12:00 PM  Group End Time:  1:00 PM  Group Topic: Cognitive Skills    [facility]    Simona Gonzalez MSW; Nayana Barrett MSW        Group Therapy Note    Writer facilitated cognitive skills group. Writer guided group through catharsis activity incorporating mediums such as paint. Writer encouraged sharing amongst peers and facilitated reflective listening among peers. Writer provided validation and positive reassurance to group members for sharing their experiences and willingness to participate in group. Writer conveyed an excerpt from a poem regarding healing and mindfulness.    Attendees: 9       Patient's Goal:  Engage in group and participate in catharsis activity.    Notes:  Patient was observed engaging in positive interpersonal relations with peer. Patient was observed to be working on the art activity appropriately. Patient shared that she would like to let go of her anxiety symptoms as well as feelings of self-doubt. Patient covered these sentiments up with things that she enjoys such as Halloween themed characteristics. Patient processed these emotions with the group. Patient will participate in future groups.    Status After Intervention:  Improved    Participation Level: Active Listener and Interactive    Participation Quality: Appropriate, Attentive, Sharing, and Supportive      Speech:  normal      Thought Process/Content: Logical  Linear      Affective Functioning: Congruent      Mood: elevated and euphoric      Level of consciousness:  Alert, Oriented x4, and Attentive      Response to Learning: Able to verbalize current knowledge/experience, Able to verbalize/acknowledge new learning, Able to retain information, Capable of insight, Able to change behavior, and Progressing to goal      Endings: None Reported    Modes of Intervention: Support, Socialization, Exploration, and

## 2024-06-14 NOTE — GROUP NOTE
Group Therapy Note    Date: 6/14/2024    Group Start Time:  1:10 PM  Group End Time:  2:00 PM  Group Topic: Psychoeducation    [facility]    Sal Capellan LCSW        Group Therapy Note    The patients were provided educational materials on emotional regulation. The patients were encouraged to discuss identifying and challenging cognitive distortions.     Attendees: 8       Patient's Goal:  Accept education and discuss emotional regulation       Notes: The patient was attentive to presentation of educational materials on emotional regulation. The patient participated in a discussion on emotional regulation. The patient shared efforts to manage anxiety. The patient will continue to be attentive to educational materials during the psycho education group.     Status After Intervention:  Unchanged    Participation Level: Active Listener and Interactive    Participation Quality: Appropriate, Attentive, Sharing, and Supportive      Speech:  normal      Thought Process/Content: Logical      Affective Functioning: Congruent      Mood: euthymic      Level of consciousness:  Alert, Oriented x4, and Attentive      Response to Learning: Able to verbalize current knowledge/experience, Capable of insight, and Progressing to goal      Endings: None Reported    Modes of Intervention: Education, Support, and Socialization      Discipline Responsible: /Counselor      Signature:  Sal Capellan LCSW
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
## 2024-06-17 ENCOUNTER — HOSPITAL ENCOUNTER (OUTPATIENT)
Facility: HOSPITAL | Age: 24
Setting detail: RECURRING SERIES
Discharge: HOME | End: 2024-06-20
Payer: MEDICAID

## 2024-06-17 VITALS
SYSTOLIC BLOOD PRESSURE: 129 MMHG | HEART RATE: 79 BPM | DIASTOLIC BLOOD PRESSURE: 88 MMHG | OXYGEN SATURATION: 100 % | TEMPERATURE: 98.2 F

## 2024-06-17 PROCEDURE — 90853 GROUP PSYCHOTHERAPY: CPT

## 2024-06-17 NOTE — GROUP NOTE
Group Therapy Note    Date: 6/17/2024    Group Start Time:  1:10 PM  Group End Time:  2:00 PM  Group Topic: Psychoeducation    [facility]    Sal Capellan LCSW        Group Therapy Note    The patients continued conversation started in previous group regarding loss. This writer encouraged and facilitated discussion on loss.     Attendees: 9       Patient's Goal: Participate in discussion on loss and family relationships       Notes: The patient participated in discussion on loss, and family relationships. The patient shared details of relationship with father and was supportive of peers.   The patient will continue to self-disclose and participate in group discussion during the psycho education group.    Status After Intervention:  Unchanged    Participation Level: Active Listener and Interactive    Participation Quality: Appropriate, Attentive, Sharing, and Supportive      Speech:  normal      Thought Process/Content: Logical      Affective Functioning: Congruent      Mood: euthymic      Level of consciousness:  Alert, Oriented x4, and Attentive      Response to Learning: Able to verbalize current knowledge/experience, Capable of insight, and Progressing to goal      Endings: None Reported    Modes of Intervention: Support, Socialization, and Activity      Discipline Responsible: /Counselor      Signature:  Sal Capellan LCSW

## 2024-06-17 NOTE — GROUP NOTE
Group Therapy Note    Date: 6/17/2024    Group Start Time:  9:40 AM  Group End Time: 10:30 AM  Group Topic: Process Group - Outpatient    [facility]    Carina Garcia MSW        Group Therapy Note    Attendees: 7    Writer facilitated process group this morning. When writer entered the room, patients were already continuing their processing from the previous group. Writer encouraged this and facilitated continued processing about interpersonal relationships and forgiveness.        Patient's Goal:  Participate in group therapy, engage in open sharing and processing with peers.     Notes:  Patient engaged well in group this morning. She presented as interactive and supportive of her peers as evidenced by providing verbal support and asking questions for clarity. Patient interacted well with peers and will continue with identified treatment goals in further groups.     Status After Intervention:  Unchanged    Participation Level: Active Listener and Interactive    Participation Quality: Appropriate, Attentive, and Sharing      Speech:  normal      Thought Process/Content: Logical      Affective Functioning: Congruent      Mood: euthymic      Level of consciousness:  Alert and Oriented x4      Response to Learning: Able to verbalize current knowledge/experience, Capable of insight, and Progressing to goal      Endings: None Reported    Modes of Intervention: Support, Socialization, and Exploration      Discipline Responsible: /Counselor      Signature:  MIESHA Perez

## 2024-06-17 NOTE — GROUP NOTE
Group Therapy Note    Date: 6/17/2024    Group Start Time:  2:00 PM  Group End Time:  2:45 PM  Group Topic: Wrap-Up    [facility]    Simona Gonzalez MSW; Nayana Barrett MSW    Group Therapy Note    Writer facilitated wrap up group. Writer encouraged processing and reflection from the day's events. Writer encouraged group participants as peers shared experiences from the day. Writer encouraged group participation in \"Three Doors\" activity, which encouraged patients to describe a door of opportunity, a door of change, and a door that described helpers within the patients' lives.    Attendees: 9       Patient's Goal:  Engage in group, participate in activity, and provide feedback to peers.    Notes:  Patient reported that she feels that she has a strong support system in the hobby with which she engages in. Patient was observed providing positive feedback to a peer and providing the peer with positive affirmations. Patient was observed to continue to provide feedback to peers regarding relating to their experiences. Patient will participate in future groups. Patient will continue to work on engaging in group, participating in activity, and providing feedback to peers.    Status After Intervention:  Improved    Participation Level: Active Listener and Interactive    Participation Quality: Appropriate, Attentive, and Sharing      Speech:  normal      Thought Process/Content: Logical  Linear      Affective Functioning: Congruent      Mood: euphoric      Level of consciousness:  Alert, Oriented x4, and Attentive      Response to Learning: Able to verbalize current knowledge/experience, Capable of insight, Able to change behavior, and Progressing to goal      Endings: None Reported    Modes of Intervention: Support, Socialization, and Activity      Discipline Responsible: /Counselor      Signature:  MIESHA Cedillo

## 2024-06-17 NOTE — GROUP NOTE
Group Therapy Note    Date: 6/17/2024    Group Start Time:  9:00 AM  Group End Time:  9:45 AM  Group Topic: [facility] Meeting    [facility]    Sal Capellan LCSW        Group Therapy Note    This writer facilitated the community group. The patients were encouraged to complete the morning check in assessment to identify mood. The patients were encouraged to discuss goals for the day.     Attendees: 7       Patient's Goal:  Identify mood and discuss goals       Notes: The patient completed the morning assessment. The patient participated in a goal identification exercise. The patient shared that her goal is to plan for discharge. The patient discussed preparing for return to work and the anxiety she has surrounding work.  The patient will continue to identify mood and discuss goals in the community group.     Status After Intervention:  Unchanged    Participation Level: Active Listener and Interactive    Participation Quality: Appropriate, Attentive, Sharing, and Supportive      Speech:  normal      Thought Process/Content: Logical      Affective Functioning: Congruent      Mood: euthymic      Level of consciousness:  Alert, Oriented x4, and Attentive      Response to Learning: Able to verbalize current knowledge/experience, Capable of insight, and Progressing to goal      Endings: None Reported    Modes of Intervention: Support, Socialization, and Activity      Discipline Responsible: /Counselor      Signature:  Sal Capellan LCSW

## 2024-06-17 NOTE — GROUP NOTE
Group Therapy Note    Date: 6/17/2024    Group Start Time: 12:00 PM  Group End Time:  1:00 PM  Group Topic: Process Group - Outpatient    [facility]    Sal Capellan LCSW        Group Therapy Note    A patient requested time to process an upcomming job interview and what to disclose. This writer facilitated conversation on employment, stress, and disclosure of disabilities.     Attendees: 7       Patient's Goal:  self-disclose and participate in discussion       Notes: The patient participated in discussion of work, returning to work, and stress at work. The patient was encouraging of peer. The patient gave peers examples of personal experience at work and accepted peer feedback regarding self-esteem and judgement of her self coming out in her work and how she leads.  The patient will continue to self-disclose and participate during the treatment planning group.     Status After Intervention:  Unchanged    Participation Level: Active Listener and Interactive    Participation Quality: Appropriate, Attentive, Sharing, and Supportive      Speech:  normal      Thought Process/Content: Logical      Affective Functioning: Congruent      Mood: euthymic      Level of consciousness:  Alert, Oriented x4, and Attentive      Response to Learning: Able to verbalize current knowledge/experience, Capable of insight, and Progressing to goal      Endings: None Reported    Modes of Intervention: Support, Socialization, and Exploration      Discipline Responsible: /Counselor      Signature:  Sla Capellan LCSW

## 2024-06-17 NOTE — GROUP NOTE
Group Therapy Note    Date: 6/17/2024    Group Start Time: 10:40 AM  Group End Time: 11:30 AM  Group Topic: Cognitive Skills    [facility]    Carina Garcia MSW        Group Therapy Note    Attendees: 8    Writer facilitated group this morning which primarily consisted of continued processing from the previous group. Writer facilitated discussion centered around side affects of intense therapy and resources for patients needing a source of income during treatment.        Patient's Goal:  Participate in group therapy, engage in open sharing and processing with peers.     Notes:  Patient engaged well in group this morning. She provided good verbal support and insight for her peers, especially related to when she had tried to continue working during her treatment. Patient demonstrated empathy and interacted well with peers and will continue with identified treatment goals in further groups .    Status After Intervention:  Unchanged    Participation Level: Active Listener and Interactive    Participation Quality: Appropriate, Attentive, and Supportive      Speech:  normal      Thought Process/Content: Logical      Affective Functioning: Congruent      Mood: anxious      Level of consciousness:  Alert and Oriented x4      Response to Learning: Able to verbalize current knowledge/experience, Capable of insight, and Progressing to goal      Endings: None Reported    Modes of Intervention: Support, Socialization, and Exploration      Discipline Responsible: /Counselor      Signature:  MIESHA Perez

## 2024-06-18 ENCOUNTER — HOSPITAL ENCOUNTER (OUTPATIENT)
Facility: HOSPITAL | Age: 24
Setting detail: RECURRING SERIES
Discharge: HOME | End: 2024-06-21
Payer: MEDICAID

## 2024-06-18 VITALS
TEMPERATURE: 98.1 F | HEART RATE: 83 BPM | DIASTOLIC BLOOD PRESSURE: 84 MMHG | OXYGEN SATURATION: 100 % | SYSTOLIC BLOOD PRESSURE: 125 MMHG

## 2024-06-18 PROCEDURE — 90853 GROUP PSYCHOTHERAPY: CPT

## 2024-06-18 PROCEDURE — 90832 PSYTX W PT 30 MINUTES: CPT

## 2024-06-18 NOTE — GROUP NOTE
Group Therapy Note    Date: 6/18/2024    Group Start Time:  2:00 PM  Group End Time:  2:45 PM  Group Topic: Wrap-Up    [facility]    Sal Capellan LCSW        Group Therapy Note    This writer facilitated the wrap up group. The patients were encouraged to complete the wrap up assessment. The patients were encouraged to complete an afternoon agenda identifying self-care or practicing a coping skills this afternoon.    Attendees: 7       Patient's Goal:  Identify mood and plan for self-care       Notes: The patient completed the afternoon assessment and denied SI/HI. The patient completed and shared the afternoon agenda. The patient shared she will help her father with installing a new floor this afternoon. The patient will continue to identify mood and plan for self-care during the wrap up group.     Status After Intervention:  Unchanged    Participation Level: Active Listener and Interactive    Participation Quality: Appropriate, Attentive, Sharing, and Supportive      Speech:  normal      Thought Process/Content: Logical      Affective Functioning: Congruent      Mood: euthymic      Level of consciousness:  Alert, Oriented x4, and Attentive      Response to Learning: Able to verbalize current knowledge/experience      Endings: None Reported    Modes of Intervention: Support, Socialization, Exploration, and Activity      Discipline Responsible: /Counselor      Signature:  Sal Capellan LCSW

## 2024-06-18 NOTE — GROUP NOTE
Group Therapy Note    Date: 6/18/2024    Group Start Time:  1:10 PM  Group End Time:  2:00 PM  Group Topic: Psychoeducation    [facility] Tala Weiner MSW        Group Therapy Note    Patients were asked to continue discussing the \"GIVE\" skill for communicating kindly. Patients were asked to identify ways to show interest in what others say and how they know others are interested. Patients were asked to identify ways to show validation and empathy. Patients were asked to identify ways to come across as non-threatening physically. Patients were encouraged to reflect on behaviors and to provide feedback to peers. Patients were given a sheet for sleep hygiene at the end of group.    Attendees: 7       Patient's Goal:  Identify communication skills, reflect on behaviors     Notes:  The patient engaged in psychoeducation group. The patient identified ways to appear friendly and demonstrate listening. The patient left group briefly and returned. The patient reflected on learning from PHP and acceptance of feelings. The patient will continue to practice reflecting and using communication skills.    Status After Intervention:  Unchanged    Participation Level: Active Listener and Interactive    Participation Quality: Appropriate, Attentive, and Sharing      Speech:  normal      Thought Process/Content: Logical  Linear      Affective Functioning: Congruent      Mood: euthymic      Level of consciousness:  Alert, Oriented x4, and Attentive      Response to Learning: Able to verbalize current knowledge/experience, Capable of insight, and Progressing to goal      Endings: None Reported    Modes of Intervention: Education and Exploration      Discipline Responsible: /Counselor      Signature:  MIESHA Garrison

## 2024-06-18 NOTE — BH NOTE
OUTPATIENT PHYSICIAN PROGRESS NOTE      Chief Complaint/Symptoms/Impairments (as noted in Treatment Plan): DIA, PTSD    Criteria for Continued Treatment (check all that apply):   [x] Preventing Decompensation   [] Improving Level of Functioning  [] Reducing Isolative Behaviors  [] Understanding Diagnosis and Need for Medications  [] Improving Treatment/Medication Compliance  [] Confronting Denial of Illness  [] Stabilizing Level of Functioning  [x] Improving Emotional/Social/Cognitive Functioning  [] Decreasing Frequency of Hospitalizations    Suicidal/Homicidal ideations:   [x] Absent  [] Present  [] Passive  [] Intent  [] Plan  [] Death Wishes      CURRENT CONDITION OF PATIENT & PROGRESS ON TREATMENT PLAN    Subjective (ongoing complaints by patient regarding symptom severity, presentation, affect, function, etc.):  Kaylee Norris reports doing well with prn medication only.  Managing well in the groups setting overall.  No aggression or violence.  Appropriately interactive and aware.  Tolerating medications well.  Appetite is fair.   Feels that she has gained from the process and is scheduled to complete program this week.      Behavior (side effects, changes in mental status, objective observations seen in individual sessions or by staff): Kaylee Berrios is calm cooperative, clear and coherent with speech of average rate volume and tone.  Moods are fair.  Affect is fair range. Appropriately dressed and groomed.  Denies SI/HI/AH/VH.  No aggression or violence.  Appropriately interactive and aware.  Insight is good and judgement is fair.      Assessment/Plan (functional improvement and/or ongoing impairment, response to treatment, plan for future ongoing treatment and medication management to include revisions):     Continue current care  Groups milieu and individual therapy  Medication modification as appropriate  Renew medications as appropriate  Disposition planning with social work          [x] NO NEW
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
## 2024-06-18 NOTE — BH NOTE
#2       Matt Bower at [facility] for outpatient therapy   Thursday 6/27/2024 at 10:00 AM in person  Phone:   [phone]  Address:  [address] [place] [address]       Writer reviewed pt's AVS summary with the pt, who expressed understanding. Pt signed her AVS. Pt will be provided a paper copy of the AVS on day of discharge. Writer reviewed pt's safety plan with pt. Pt identified appropriate updates to the safety plan, which was updated in Epic. Pt was provided a paper copy of her safety plan.    Pt asked appropriate, brief questions around explaining her time in PHP as well as upcoming weekly therapy to potential future employers. Writer reminded pt of her ability to set boundaries around information shared. Pt expressed understanding as well as confidence in her ability to do so.     Nayana Barrett LCSW

## 2024-06-18 NOTE — SUICIDE SAFETY PLAN
SAFETY PLAN     A suicide Safety Plan is a document that supports someone when they are having thoughts of suicide.     Warning Signs that indicate a suicidal crisis may be developing: What (situations, thoughts, feelings, body sensations, behaviors, etc.) do you experience that lets you know you are beginning to think about suicide?  1. Indecisiveness, jumbled words  2. Shortness of breath  3. Whole body is tense     Internal Coping Strategies:  What things can I do (relaxation techniques, hobbies, physical activities, etc.) to take my mind off my problems without contacting another person?  1. Go to the mall wearing a cute outfit  2. Eating food  3. Going to the park  4. Deep breathing  5. Listen to music  6. Pet cat     People and social settings that provide distraction: Who can I call or where can I go to distract me?  1. Name: Cat                Phone: [phone]  2. Name: [place]               Phone: [phone]   3. Place: park near house          4. Place: [facility]     People whom I can ask for help: Who can I call when I need help - for example, friends, family, clergy, someone else?  1. Name: Cat             Phone: [phone]  2. Name: [place]               Phone: [phone]   3. Name: Daisy            Phone: [phone]     Professionals or Behavioral Health agencies I can contact during a crisis: Who can I call for help - for example, my doctor, my psychiatrist, my psychologist, a mental health provider, a suicide hotline?     1. Clinician Name: Nayana Barrett LCSW   Phone: [phone]       Clinician Pager or Emergency Contact #: [phone]      2. Clinician Name: Matt Bower    Phone: [phone]       Clinician Pager or Emergency Contact #: [phone]     3. Suicide Prevention Lifeline: 988     4. Local Behavioral Health Emergency Services -  for example, [facility], [facility] suicide hotline, [facility] Hotline: [facility]       Emergency Services
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
## 2024-06-18 NOTE — GROUP NOTE
Group Therapy Note    Date: 6/18/2024    Group Start Time: 10:40 AM  Group End Time: 11:30 AM  Group Topic: Cognitive Skills    [facility]    Carina Garcia MSW        Group Therapy Note    Attendees: 7    Writer facilitated cognitive group this morning. Writer opened by asking if there was further processing to be done after last group due to previous facilitator notifying writer that there may be more time needed. Writer facilitated continued processing throughout group, especially focused around family and relationship dynamics.        Patient's Goal:  Participate in group therapy, engage in open sharing and processing with peers.     Notes:  Patient was pulled for individual session during this group.     Status After Intervention:  Unchanged    Participation Level: Active Listener and Interactive    Participation Quality: Appropriate and Attentive      Speech:  normal      Thought Process/Content: Logical      Affective Functioning: Congruent      Mood: euthymic      Level of consciousness:  Alert and Oriented x4      Response to Learning: Able to retain information, Capable of insight, and Progressing to goal      Endings: None Reported    Modes of Intervention: Support, Socialization, and Exploration      Discipline Responsible: /Counselor      Signature:  MIESHA ePrez

## 2024-06-18 NOTE — GROUP NOTE
Group Therapy Note    Date: 6/18/2024    Group Start Time:  9:00 AM  Group End Time:  9:40 AM  Group Topic: Community Meeting    [facility]    Sal Capellan LCSW        Group Therapy Note    This writer facilitated the community group. The patients were encouraged to complete the welcome assessment to identify mood and any safety concerns. The patients were encouraged to discuss their previous evening with peers to process. The patients started a discussion of past inpatient treatment experience. This writer provided education on TDO and ECO.     Attendees: 5       Patient's Goal: Identify mood and self-disclose       Notes: The patient completed the check in assessment and denied SI/HI. The patient participated in a discussion on past treatment. The patient alluded to a difficult experience in past treatment and excused her self at the end of the group due to the topic.  The patient will continue to identify mood and self-disclose in the community group.     Status After Intervention:  Unchanged    Participation Level: Active Listener and Interactive    Participation Quality: Appropriate, Attentive, and Supportive      Speech:  normal      Thought Process/Content: Logical      Affective Functioning: Congruent      Mood: euthymic      Level of consciousness:  Alert, Oriented x4, and Attentive      Response to Learning: Able to verbalize current knowledge/experience, Capable of insight, and Progressing to goal      Endings: None Reported    Modes of Intervention: Support, Socialization, and Exploration      Discipline Responsible: /Counselor      Signature:  Sal Capellan LCSW

## 2024-06-18 NOTE — DISCHARGE SUMMARY
PARTIAL HOSPITALIZATION PROGRAM DISCHARGE SUMMARY    Kaylee Berrios  23 y.o.  2000  [id number]  [phone]        Admission Date: 5/7/2024  Discharge Date: 6/20/2024    Admission Diagnosis (DSM 5): F43.10 Post traumatic stress disorder, F41.9 anxiety disorder     Discharge Diagnosis (DSM 5): F43.10 Post traumatic stress disorder, F41.9 anxiety disorder     Status at Last Contact: Pt present at [facility] on day of discharge summary completion. Pt presented as oriented times 4, alert, and euthymic in accordance with her baseline.     Date and Time of Last Contact/Attempted Contact: 6/18/[phone]    Guardian Contacted: N/A    Reason for Admission (Summary): Pt readmitted to [facility] following discharge due to Code of Conduct violation. Pt expressed need for further support for somatic symptoms of anxiety and trauma as well as need to improve coping skills. Pt expressed remorse for her prior Code of Conduct violation and understanding of why that rule is in place.        Reason for Discharge (check all that apply):  [x] Planned Discharge/Completed Treatment   [] Transition to Inpatient Unit -Unplanned Discharge   [] Continued Absences-Unplanned Discharge  [] Administrative Discharge    [] Financial Discharge Due to Patient Request  [] Unplanned Discharge due Medical Admission     [] AMA     Summary of Progress and Course of Treatment (including treatment plan goals and objective achieved/not achieved during treatment/level of functioning): Pt achieved 3 of 3 goals. Pt presents with a higher level of functioning relative to her intake and endorses improved functioning and mood. Pt has improved anxiety management through increased independent coping, appropriate medication use, and transparent communication with staff around safety. Pt appropriately advocated for herself around medications and followed recommendations of providers. Pt presented as completing her trauma symptom management goal through increased processing of

## 2024-06-19 ENCOUNTER — HOSPITAL ENCOUNTER (OUTPATIENT)
Facility: HOSPITAL | Age: 24
Setting detail: RECURRING SERIES
Discharge: HOME | End: 2024-06-22
Payer: MEDICAID

## 2024-06-19 VITALS
OXYGEN SATURATION: 100 % | DIASTOLIC BLOOD PRESSURE: 63 MMHG | HEART RATE: 82 BPM | SYSTOLIC BLOOD PRESSURE: 132 MMHG | TEMPERATURE: 98.5 F

## 2024-06-19 PROCEDURE — 90853 GROUP PSYCHOTHERAPY: CPT
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
## 2024-06-20 ENCOUNTER — HOSPITAL ENCOUNTER (OUTPATIENT)
Facility: HOSPITAL | Age: 24
Setting detail: RECURRING SERIES
Discharge: HOME | End: 2024-06-23
Payer: MEDICAID

## 2024-06-20 VITALS
SYSTOLIC BLOOD PRESSURE: 133 MMHG | HEART RATE: 90 BPM | DIASTOLIC BLOOD PRESSURE: 94 MMHG | TEMPERATURE: 97.8 F | OXYGEN SATURATION: 100 %

## 2024-06-20 PROCEDURE — 90853 GROUP PSYCHOTHERAPY: CPT
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
## 2024-06-20 NOTE — BH NOTE
Vitals note.    Pt's had a BP of 133/94 when taking this morning. BP was flagged as abnormal in flowsheets. Pt. Denied symptoms.           At 09:25 am this writer spoke to Dr Nieto and reported on pt's vitals, lack of endorsed physical symptoms.       Sal Capellan [facility]

## 2024-06-20 NOTE — GROUP NOTE
Group Therapy Note    Date: 6/20/2024    Group Start Time:  9:00 AM  Group End Time:  9:40 AM  Group Topic: Community Meeting    [facility]    Carina Garcia MSW        Group Therapy Note    Attendees: 6    Writer facilitated community check in group this morning. Writer opened by encouraging patients to fill out their check in sheets. Writer then asked patients to use one word to describe how they were feeling this morning. Writer used this as a starting point for processing, and facilitated discussion around socialization and trauma responses until the end of group.        Patient's Goal:  Participate in group therapy, complete check in sheet, engage in open sharing and rapport building with peers.     Notes:  Patient engaged well in group this morning. She filled out her check in sheet, declining any SI or thoughts of self harm. She used the word \"jittery\" to describe herself this morning. Patient reflected about her discharge today, noting that she has had a very positive experience in PHP and advising her peers that are newer to treatment to keep an open mind. She presented as receptive to feedback and encouragement from peers and will continue with identified treatment goals in further groups.     Status After Intervention:  Improved    Participation Level: Active Listener and Interactive    Participation Quality: Appropriate, Attentive, Sharing, and Supportive      Speech:  normal      Thought Process/Content: Logical      Affective Functioning: Congruent      Mood: euthymic      Level of consciousness:  Alert and Oriented x4      Response to Learning: Able to verbalize current knowledge/experience, Capable of insight, and Progressing to goal      Endings: None Reported    Modes of Intervention: Support, Socialization, and Exploration      Discipline Responsible: /Counselor      Signature:  MIESHA Perez

## 2024-06-20 NOTE — BH NOTE
OUTPATIENT PHYSICIAN PROGRESS NOTE      Chief Complaint/Symptoms/Impairments (as noted in Treatment Plan): DIA, PTSD    Criteria for Continued Treatment (check all that apply):   [x] Preventing Decompensation   [] Improving Level of Functioning  [] Reducing Isolative Behaviors  [] Understanding Diagnosis and Need for Medications  [] Improving Treatment/Medication Compliance  [] Confronting Denial of Illness  [] Stabilizing Level of Functioning  [x] Improving Emotional/Social/Cognitive Functioning  [] Decreasing Frequency of Hospitalizations    Suicidal/Homicidal ideations:   [x] Absent  [] Present  [] Passive  [] Intent  [] Plan  [] Death Wishes      CURRENT CONDITION OF PATIENT & PROGRESS ON TREATMENT PLAN    Subjective (ongoing complaints by patient regarding symptom severity, presentation, affect, function, etc.):  Kaylee Norris reports doing well with prn medication only.  Managing well in the groups setting overall.  No aggression or violence.  Appropriately interactive and aware.  Tolerating medications well.  Appetite is fair.   Feels that she has gained from the process and is scheduled to complete program this week.      Behavior (side effects, changes in mental status, objective observations seen in individual sessions or by staff): Kaylee Berrios is calm cooperative, clear and coherent with speech of average rate volume and tone.  Moods are fair.  Affect is full range. Appropriately dressed and groomed.  Denies SI/HI/AH/VH.  No aggression or violence.  Appropriately interactive and aware.  Insight is good and judgement is fair.      Assessment/Plan (functional improvement and/or ongoing impairment, response to treatment, plan for future ongoing treatment and medication management to include revisions):     Continue current care  Groups milieu and individual therapy  Medication modification as appropriate  Renew medications as appropriate  Disposition planning with social work for this

## 2024-06-20 NOTE — GROUP NOTE
Group Therapy Note    Date: 6/20/2024    Group Start Time: 10:40 AM  Group End Time: 11:30 AM  Group Topic: Process Group - Outpatient    [facility]    Tala Tanner MSW        Group Therapy Note    Patients entered group stating they had more things they needed to process with peers. Patients were given space and time to openly process thoughts, feelings, and experiences. Patients were encouraged o offer feedback an support. Patients were encouraged to reflect on strengths.    Attendees: 9       Patient's Goal:  Process thoughts and emotions, support peers    Notes:  The patient engaged in process group. The patient reflected on things shared in previous group. The patient offered feedback and support to peers. The patient identified shared experiences with peers. The patient will continue to practice supporting and connecting with peers.    Status After Intervention:  Unchanged    Participation Level: Active Listener and Interactive    Participation Quality: Appropriate, Attentive, and Supportive      Speech:  normal      Thought Process/Content: Logical  Linear      Affective Functioning: Congruent      Mood: euthymic      Level of consciousness:  Alert, Oriented x4, and Attentive      Response to Learning: Able to verbalize current knowledge/experience and Progressing to goal      Endings: None Reported    Modes of Intervention: Support      Discipline Responsible: /Counselor      Signature:  MIESHA Garrison

## 2024-06-20 NOTE — GROUP NOTE
Group Therapy Note    Date: 6/20/2024    Group Start Time:  1:10 PM  Group End Time:  2:00 PM  Group Topic: Psychoeducation    [facility]    Nayana Barrett MSW        Group Therapy Note    Writer facilitated psychoeducation group on anxiety and exposure hierarchies. Writer utilized socratic, open ended questions to encourage pts to identify their understanding of anxiety and process. Writer provided education on the anxiety cycle and exposure hierarchies. Writer encouraged pts to complete exposure hierarchies and share.     Attendees: 9         Patient's Goal:  understanding anxiety and exposure hierarchies.    Notes:  Pt presented as attentive and engaged as evidenced by spontaneous participation. Pt shared about her anxiety symptoms. Pt discussed ways in which she has broken her anxiety cycle around driving and experienced less anxiety. Pt answered peer questions appropriately. Pt will continue to work on understanding anxiety and exposure hierarchies.    Status After Intervention:  Unchanged    Participation Level: Active Listener and Interactive    Participation Quality: Appropriate, Attentive, Sharing    Speech:  normal      Thought Process/Content: Logical      Affective Functioning: Congruent      Mood: euthymic      Level of consciousness:  Alert, Oriented x4, and Attentive      Response to Learning: Able to verbalize current knowledge/experience and Progressing to goal      Endings: None Reported    Modes of Intervention: Education      Discipline Responsible: /Counselor      Signature:  MIESHA RAMÍREZ

## 2024-06-20 NOTE — GROUP NOTE
Group Therapy Note    Date: 6/20/2024    Group Start Time: 12:00 PM  Group End Time:  1:00 PM  Group Topic: Process Group - Outpatient    [facility]    Sal Capellan LCSW        Group Therapy Note    The patients were encouraged to listen to and reflect a peer who requested to share and receive feedback from peers. The patients were encouraged to participate in farewell activity for discharging peer.     Attendees: 8       Patient's Goal: participate in discussion following peer disclosure, participate in farewell activity.       Notes: The patient participated in providing support, feedback, and advice. The patient encouraged peer who disclosed.  The patient accepted support from peers during the discharge support. The patient will continue to participate in supporting peers during the trauma and grief group.     Status After Intervention:  Unchanged    Participation Level: Active Listener and Interactive    Participation Quality: Appropriate, Attentive, Sharing, and Supportive      Speech:  normal      Thought Process/Content: Logical      Affective Functioning: Congruent      Mood: euthymic      Level of consciousness:  Alert, Oriented x4, and Attentive      Response to Learning: Able to verbalize current knowledge/experience, Capable of insight, and Progressing to goal      Endings: None Reported    Modes of Intervention: Support, Socialization, and Exploration      Discipline Responsible: /Counselor      Signature:  Sal Capellan LCSW

## 2024-06-20 NOTE — GROUP NOTE
Group Therapy Note    Date: 6/20/2024    Group Start Time:  2:00 PM  Group End Time:  2:45 PM  Group Topic: Wrap-Up    [facility]    Nayana Barrett MSW        Group Therapy Note    Writer facilitated wrap up group. Writer assessed for safety using wrap up forms. Writer facilitated brief processing as requested by pts. Writer transitioned to facilitating farewells to a discharging pt. Writer closed with an affirmations activity and encouraged pts to share and reflect.    Attendees: 9       Patient's Goal:  disclosing safety concerns and practicing affirmations    Notes:  Pt denied SI/HI on wrap up. Pt listened appropriately as peers shared. Pt presented as emotional during farewells. Pt engaged appropriately in the affirmations activity. Pt presented as tearful when saying goodbye to peers and staff. Pt will continue to work on disclosing safety concerns and practicing affirmations.    Status After Intervention:  Unchanged    Participation Level: Active Listener and Interactive    Participation Quality: Appropriate, Attentive, and Sharing      Speech:  normal      Thought Process/Content: Logical      Affective Functioning: Congruent      Mood: euthymic and sad      Level of consciousness:  Alert, Oriented x4, and Attentive      Response to Learning: Able to verbalize current knowledge/experience and Progressing to goal      Endings: None Reported    Modes of Intervention: Support and Activity      Discipline Responsible: /Counselor      Signature:  MIESHA RAMÍREZ

## 2024-06-21 ENCOUNTER — APPOINTMENT (OUTPATIENT)
Facility: HOSPITAL | Age: 24
End: 2024-06-21
Payer: MEDICAID

## 2024-06-24 ENCOUNTER — APPOINTMENT (OUTPATIENT)
Facility: HOSPITAL | Age: 24
End: 2024-06-24
Payer: MEDICAID

## 2024-06-25 ENCOUNTER — APPOINTMENT (OUTPATIENT)
Facility: HOSPITAL | Age: 24
End: 2024-06-25
Payer: MEDICAID

## 2024-06-25 ENCOUNTER — TELEPHONE (OUTPATIENT)
Facility: HOSPITAL | Age: 24
End: 2024-06-25

## 2024-06-25 NOTE — TELEPHONE ENCOUNTER
[id number] Writer left  for pt, as call had gone directly to , requesting a call back to follow up with pt as well as check if she needed additional resources. Writer did not provide HIPAA information. Writer provided her direct office number for call back.    Nayana Barrett LCSW

## 2024-06-26 ENCOUNTER — APPOINTMENT (OUTPATIENT)
Facility: HOSPITAL | Age: 24
End: 2024-06-26
Payer: MEDICAID

## 2024-06-27 ENCOUNTER — TELEPHONE (OUTPATIENT)
Facility: HOSPITAL | Age: 24
End: 2024-06-27

## 2024-06-27 ENCOUNTER — APPOINTMENT (OUTPATIENT)
Facility: HOSPITAL | Age: 24
End: 2024-06-27
Payer: MEDICAID

## 2024-06-27 NOTE — TELEPHONE ENCOUNTER
[id number] Pt called writer back from after visit call. Pt reported doing well and that she has attended both her psychiatrist appt and OP therapy appt. Pt reported gratitude for PHP staff and stated witnessing how many people have been helped by the program. Pt discussed her various healthy socialization plans and endorsed optimism for the future. Writer thanked pt and expressed excitement for the pt. Writer encouraged pt to remember she can always call [facility] for support with resources. Pt thanked writer. Writer ended call.    Nayana Barrett LCSW

## 2024-06-28 ENCOUNTER — APPOINTMENT (OUTPATIENT)
Facility: HOSPITAL | Age: 24
End: 2024-06-28
Payer: MEDICAID

## 2024-08-03 NOTE — ED TRIAGE NOTES
C/o L forearm pain after hitting arm hard on coffee table while roughhousing with friends yesterday evening. 6

## 2024-09-30 ENCOUNTER — TELEPHONE (OUTPATIENT)
Facility: HOSPITAL | Age: 24
End: 2024-09-30

## 2024-09-30 NOTE — TELEPHONE ENCOUNTER
Pt called writer at 10:40am asking if staff knew of any income based housing options in the area due to pt being in poor living situation. Writer advised pt to contact the Homeless Connection Line. Pt reported having a few numbers to reach out to. Writer agreed to ask other staff if they know of resources and that writer will call pt back with any new resources. Pt reported doing well mentally and physically, and having a full time job as well as supportive friends. Pt thanked writer and call ended.    MIESHA Garrison

## 2024-10-27 ENCOUNTER — HOSPITAL ENCOUNTER (EMERGENCY)
Facility: HOSPITAL | Age: 24
Discharge: HOME | End: 2024-10-27
Attending: EMERGENCY MEDICINE
Payer: MEDICAID

## 2024-10-27 ENCOUNTER — APPOINTMENT (OUTPATIENT)
Facility: HOSPITAL | Age: 24
End: 2024-10-27
Payer: MEDICAID

## 2024-10-27 VITALS
HEIGHT: 61 IN | HEART RATE: 92 BPM | WEIGHT: 123.68 LBS | SYSTOLIC BLOOD PRESSURE: 121 MMHG | DIASTOLIC BLOOD PRESSURE: 82 MMHG | BODY MASS INDEX: 23.35 KG/M2 | RESPIRATION RATE: 18 BRPM | OXYGEN SATURATION: 100 % | TEMPERATURE: 99 F

## 2024-10-27 DIAGNOSIS — R51.9 RIGHT-SIDED HEADACHE: Primary | ICD-10-CM

## 2024-10-27 PROCEDURE — 99284 EMERGENCY DEPT VISIT MOD MDM: CPT

## 2024-10-27 PROCEDURE — 70450 CT HEAD/BRAIN W/O DYE: CPT

## 2024-10-27 ASSESSMENT — PAIN - FUNCTIONAL ASSESSMENT
PAIN_FUNCTIONAL_ASSESSMENT: ACTIVITIES ARE NOT PREVENTED
PAIN_FUNCTIONAL_ASSESSMENT: 0-10

## 2024-10-27 ASSESSMENT — PAIN DESCRIPTION - FREQUENCY: FREQUENCY: CONTINUOUS

## 2024-10-27 ASSESSMENT — PAIN DESCRIPTION - ONSET: ONSET: ON-GOING

## 2024-10-27 ASSESSMENT — PAIN DESCRIPTION - ORIENTATION: ORIENTATION: RIGHT

## 2024-10-27 ASSESSMENT — PAIN DESCRIPTION - PAIN TYPE: TYPE: ACUTE PAIN

## 2024-10-27 ASSESSMENT — PAIN SCALES - GENERAL: PAINLEVEL_OUTOF10: 2

## 2024-10-27 ASSESSMENT — PAIN DESCRIPTION - LOCATION: LOCATION: HEAD

## 2024-10-27 ASSESSMENT — PAIN DESCRIPTION - DESCRIPTORS: DESCRIPTORS: ACHING

## 2024-10-27 NOTE — ED TRIAGE NOTES
[facility] Nursing Note        Patient Name: Kaylee Berrios      : 2000             MRN: [id number]      Chief Complaint:  Headache      Admit Diagnosis: No admission diagnoses are documented for this encounter.      Admitting Provider: No admitting provider for patient encounter.      Surgery: * No surgery found *           Patient arrived to the ER ambulatory from home with complaints of Right sided Head Pain that started a month ago. No trauma or falls reported. No Hx of Migraines.         Lines:        Signed by: Amador Lee RN, DINORAH, BSN, CMSRN                                              10/27/2024 at 7:38 PM

## 2024-10-28 NOTE — ED PROVIDER NOTES
Past Medical History:   Diagnosis Date    Concussion     Constipation     Dental disorder     Irritable bowel syndrome     Murmur     Psychiatric disorder     Anxiety    Psychiatric disorder     PTSD    Suicide attempt (HCC)     Vision decreased          SURGICAL HISTORY     No past surgical history on file.      CURRENT MEDICATIONS       Discharge Medication List as of 10/27/2024 10:10 PM        CONTINUE these medications which have NOT CHANGED    Details   escitalopram (LEXAPRO) 5 MG tablet Take 1 tablet by mouth daily, Disp-30 tablet, R-0Normal      busPIRone (BUSPAR) 5 MG tablet TAKE 3 TABLETS BY MOUTH TWICE A DAYHistorical Med      propranolol (INDERAL) 10 MG tablet Take 1 tablet by mouth 2 times daily as needed (anxiety), Disp-90 tablet, R-0Normal      hydrOXYzine HCl (ATARAX) 10 MG tablet Take 2.5 tablets by mouth 3 times daily as needed for Anxiety, Disp-30 tablet, R-0Normal      !! albuterol sulfate HFA (VENTOLIN HFA) 108 (90 Base) MCG/ACT inhaler Inhale 2 puffs into the lungs 4 times daily as needed for Wheezing, Disp-18 g, R-0Normal      !! albuterol sulfate HFA (PROVENTIL;VENTOLIN;PROAIR) 108 (90 Base) MCG/ACT inhaler Inhale 2 puffs into the lungs every 4 hours as neededHistorical Med       !! - Potential duplicate medications found. Please discuss with provider.          ALLERGIES     Patient has no known allergies.    FAMILY HISTORY       Family History   Problem Relation Age of Onset    Psychiatric Disorder Paternal Grandmother     Heart Disease Other     Alcohol Abuse Other     Asthma Neg Hx     Bleeding Prob Neg Hx     Cancer Neg Hx     Diabetes Neg Hx     Elevated Lipids Neg Hx     Hypertension Neg Hx     Lung Disease Neg Hx     Stroke Neg Hx     Mental Retardation Neg Hx     Osteoarthritis Maternal Grandmother     Headache Maternal Grandmother     Migraines Maternal Grandmother           SOCIAL HISTORY       Social History     Socioeconomic History    Marital status: Single   Tobacco Use

## 2024-11-21 ENCOUNTER — HOSPITAL ENCOUNTER (EMERGENCY)
Facility: HOSPITAL | Age: 24
Discharge: HOME | End: 2024-11-21
Attending: EMERGENCY MEDICINE
Payer: MEDICAID

## 2024-11-21 VITALS
SYSTOLIC BLOOD PRESSURE: 141 MMHG | OXYGEN SATURATION: 98 % | RESPIRATION RATE: 16 BRPM | HEIGHT: 61 IN | DIASTOLIC BLOOD PRESSURE: 94 MMHG | TEMPERATURE: 97.9 F | HEART RATE: 86 BPM | WEIGHT: 120 LBS | BODY MASS INDEX: 22.66 KG/M2

## 2024-11-21 DIAGNOSIS — R09.A9 FOREIGN BODY SENSATION IN EAR CANAL, RIGHT: Primary | ICD-10-CM

## 2024-11-21 PROCEDURE — 99282 EMERGENCY DEPT VISIT SF MDM: CPT

## 2024-11-22 NOTE — ED PROVIDER NOTES
[facility]  EMERGENCY DEPARTMENT ENCOUNTER      Pt Name: Kaylee Berrios  MRN: [id number]  Birthdate 2000  Date of evaluation: 11/21/2024  Provider: Chris Braxton MD    CHIEF COMPLAINT       Chief Complaint   Patient presents with    Foreign Body in Ear         HISTORY OF PRESENT ILLNESS   (Location/Symptom, Timing/Onset, Context/Setting, Quality, Duration, Modifying Factors, Severity)  Note limiting factors.   24-year-old with a history of IBS, PTSD, anxiety.  She presents with a 2-day history of a foreign body sensation in her right ear.  She feels like there is an insect in there as she feels motion in the ear canal at times.  No other complaints.          Review of External Medical Records:     Nursing Notes were reviewed.    REVIEW OF SYSTEMS    (2-9 systems for level 4, 10 or more for level 5)     Review of Systems    Except as noted above the remainder of the review of systems was reviewed and negative.       PAST MEDICAL HISTORY     Past Medical History:   Diagnosis Date    Concussion     Constipation     Dental disorder     Irritable bowel syndrome     Murmur     Psychiatric disorder     Anxiety    Psychiatric disorder     PTSD    Suicide attempt (HCC)     Vision decreased          SURGICAL HISTORY     History reviewed. No pertinent surgical history.      CURRENT MEDICATIONS       Previous Medications    ALBUTEROL SULFATE HFA (PROVENTIL;VENTOLIN;PROAIR) 108 (90 BASE) MCG/ACT INHALER    Inhale 2 puffs into the lungs every 4 hours as needed    ALBUTEROL SULFATE HFA (VENTOLIN HFA) 108 (90 BASE) MCG/ACT INHALER    Inhale 2 puffs into the lungs 4 times daily as needed for Wheezing    BUSPIRONE (BUSPAR) 5 MG TABLET    TAKE 3 TABLETS BY MOUTH TWICE A DAY    ESCITALOPRAM (LEXAPRO) 5 MG TABLET    Take 1 tablet by mouth daily    HYDROXYZINE HCL (ATARAX) 10 MG TABLET    Take 2.5 tablets by mouth 3 times daily as needed for Anxiety    PROPRANOLOL (INDERAL) 10 MG TABLET    Take 1 tablet by mouth 2 times

## 2025-06-03 ENCOUNTER — HOSPITAL ENCOUNTER (EMERGENCY)
Facility: HOSPITAL | Age: 25
Discharge: HOME | End: 2025-06-03
Attending: STUDENT IN AN ORGANIZED HEALTH CARE EDUCATION/TRAINING PROGRAM
Payer: MEDICAID

## 2025-06-03 VITALS
SYSTOLIC BLOOD PRESSURE: 145 MMHG | HEART RATE: 94 BPM | BODY MASS INDEX: 24.99 KG/M2 | OXYGEN SATURATION: 100 % | RESPIRATION RATE: 15 BRPM | WEIGHT: 132.28 LBS | TEMPERATURE: 98.2 F | DIASTOLIC BLOOD PRESSURE: 95 MMHG

## 2025-06-03 DIAGNOSIS — J02.9 VIRAL PHARYNGITIS: Primary | ICD-10-CM

## 2025-06-03 PROCEDURE — 99283 EMERGENCY DEPT VISIT LOW MDM: CPT

## 2025-06-03 RX ORDER — IBUPROFEN 600 MG/1
600 TABLET, FILM COATED ORAL
Status: DISCONTINUED | OUTPATIENT
Start: 2025-06-03 | End: 2025-06-03

## 2025-06-03 RX ORDER — ACETAMINOPHEN 325 MG/1
650 TABLET ORAL EVERY 6 HOURS PRN
Qty: 120 TABLET | Refills: 0 | Status: SHIPPED | OUTPATIENT
Start: 2025-06-03

## 2025-06-03 RX ORDER — IBUPROFEN 600 MG/1
600 TABLET, FILM COATED ORAL 3 TIMES DAILY PRN
Qty: 30 TABLET | Refills: 0 | Status: SHIPPED | OUTPATIENT
Start: 2025-06-03

## 2025-06-03 ASSESSMENT — PAIN SCALES - GENERAL: PAINLEVEL_OUTOF10: 2

## 2025-06-03 ASSESSMENT — PAIN - FUNCTIONAL ASSESSMENT: PAIN_FUNCTIONAL_ASSESSMENT: 0-10

## 2025-06-03 ASSESSMENT — PAIN DESCRIPTION - LOCATION: LOCATION: THROAT

## 2025-06-03 ASSESSMENT — PAIN DESCRIPTION - DESCRIPTORS: DESCRIPTORS: SORE

## 2025-06-04 NOTE — ED TRIAGE NOTES
Pt to ED with cc of a sore throat and canker sores in her mouth. Pt got home from a festival yesterday and this started since. Denies difficulty swallowing or handling secretions.

## 2025-06-04 NOTE — ED PROVIDER NOTES
[facility] EMERGENCY DEPARTMENT  EMERGENCY DEPARTMENT ENCOUNTER      Pt Name: Kaylee Berriso  MRN: [id number]  Birthdate 2000  Date of evaluation: 6/3/2025  Provider: Lenore Jeronimo MD    CHIEF COMPLAINT       Chief Complaint   Patient presents with    Mouth Lesions    Pharyngitis       ALLERGIES     Patient has no known allergies.    ENCOUNTER     HISTORY OF PRESENT ILLNESS  24-year-old female presents via self-transport with sore throat and oral lesions that began yesterday. She reports noting an oral sore earlier today, with multiple others having resolved, and describes her throat as feeling sore after sharing drinks with multiple people at a music festival. She denies fever, dysphagia, dysphonia, cough, shortness of breath, chest pain, difficulty swallowing, and reports no rashes aside from sunburn. Pain is currently well controlled.     PHYSICAL EXAM  Vitals: Interpreted as normal for this patient.  General: NAD. Well-kept male adult.  Eyes: Normal, no scleral icterus.  HENT: Mild aphthous ulcer present on buccal mucosa, no exudates, no pharyngeal edema, no lymphadenopathy. Normal TMs. Uvula midline.   Neck: Supple, no lymphadenopathy.  Cardiac: Regular rate and rhythm, no murmurs.  Respiratory: Lungs clear bilaterally, no abnormal breath sounds.  Abdomen: Soft, nontender, nondistended.  MS: Extremities atraumatic, no edema.  Skin: No exanthems, no cyanosis, no diaphoresis.  Back exam: Atraumatic.  Neurologic: No altered mental status, fluent speech, normal gait.  Psychological: Cooperative and participatory with examination.    SUMMARY  24 year old adult female presented with sore throat and resolving oral ulcers after sharing drinks. Exam showed one remaining apthous ulcer, mild posterior pharyngeal erythema, no exudates, no lymphadenopathy, and normal vitals. Clinical findings consistent with aphthous ulcers and viral pharyngitis; no antibiotics indicated. Discharged home with supportive

## 2025-06-20 ENCOUNTER — HOSPITAL ENCOUNTER (OUTPATIENT)
Facility: HOSPITAL | Age: 25
Setting detail: RECURRING SERIES
Discharge: HOME | End: 2025-06-23
Payer: MEDICAID

## 2025-06-20 VITALS
OXYGEN SATURATION: 99 % | SYSTOLIC BLOOD PRESSURE: 127 MMHG | DIASTOLIC BLOOD PRESSURE: 86 MMHG | HEART RATE: 99 BPM | TEMPERATURE: 98.2 F

## 2025-06-20 PROCEDURE — 90853 GROUP PSYCHOTHERAPY: CPT

## 2025-06-20 ASSESSMENT — PATIENT HEALTH QUESTIONNAIRE - PHQ9
2. FEELING DOWN, DEPRESSED OR HOPELESS: NOT AT ALL
SUM OF ALL RESPONSES TO PHQ QUESTIONS 1-9: 0
SUM OF ALL RESPONSES TO PHQ QUESTIONS 1-9: 0
1. LITTLE INTEREST OR PLEASURE IN DOING THINGS: NOT AT ALL
SUM OF ALL RESPONSES TO PHQ QUESTIONS 1-9: 0
SUM OF ALL RESPONSES TO PHQ QUESTIONS 1-9: 0

## 2025-06-20 ASSESSMENT — ANXIETY QUESTIONNAIRES
GAD7 TOTAL SCORE: 19
1. FEELING NERVOUS, ANXIOUS, OR ON EDGE: NEARLY EVERY DAY
2. NOT BEING ABLE TO STOP OR CONTROL WORRYING: NEARLY EVERY DAY
6. BECOMING EASILY ANNOYED OR IRRITABLE: MORE THAN HALF THE DAYS
5. BEING SO RESTLESS THAT IT IS HARD TO SIT STILL: NEARLY EVERY DAY
3. WORRYING TOO MUCH ABOUT DIFFERENT THINGS: NEARLY EVERY DAY
4. TROUBLE RELAXING: MORE THAN HALF THE DAYS
7. FEELING AFRAID AS IF SOMETHING AWFUL MIGHT HAPPEN: NEARLY EVERY DAY

## 2025-06-20 NOTE — GROUP NOTE
Group Therapy Note    Date: 6/20/2025    Group Start Time:  2:00 PM  Group End Time:  2:45 PM  Group Topic: Wrap-Up    [facility]    Abbie Llamas LCSW        Group Therapy Note  Writer facilitated a community wrap up group focused on assessing for safety, coping skills practice, and planning ways to engage in healthy coping while in the community. Writer facilitated a symptom and safety check in using the afternoon check in form. Writer then encouraged pts to create glimmer boards by identifying things that bring them bubba.    Attendees: 5       Patient's Goal: Pt will continue to practice disclosing safety concerns and exploring things that bring them bubba.     Notes: Pt engaged in the group evidenced by spontaneous participation. Pt completed the wrap up sheet and denied any safety concerns. Pt reviewed safety plan. Pt completed her glimmer board and shared with the group.  Pt will continue to practice disclosing safety concerns and exploring things that bring them bubba.     Status After Intervention:  Unchanged    Participation Level: Active Listener and Interactive    Participation Quality: Appropriate and Sharing      Speech:  normal      Thought Process/Content: Logical      Affective Functioning: Congruent      Mood: euthymic      Level of consciousness:  Alert and Oriented x4      Response to Learning: Able to verbalize current knowledge/experience and Progressing to goal      Endings: None Reported    Modes of Intervention: Support and Exploration      Discipline Responsible: /Counselor      Signature:  Abbei Llamas LCSW

## 2025-06-20 NOTE — SUICIDE SAFETY PLAN
SAFETY PLAN    A suicide Safety Plan is a document that supports someone when they are having thoughts of suicide.    Warning Signs that indicate a suicidal crisis may be developing: What (situations, thoughts, feelings, body sensations, behaviors, etc.) do you experience that lets you know you are beginning to think about suicide?  1. Stuttering words  2. Dissociating  3. Shortness of breath  4. Numb hands (when things are really bad)    Internal Coping Strategies:  What things can I do (relaxation techniques, hobbies, physical activities, etc.) to take my mind off my problems without contacting another person?  1. Tapping  2. Exercise or physical activity    People and social settings that provide distraction: Who can I call or where can I go to distract me?  1. Name: Cat (friend)  Phone: in pt's phone  2. Name: Madison (mom)  Phone: in pt's phone   3. Place: Working in the garden             4. Place: The car     People whom I can ask for help: Who can I call when I need help - for example, friends, family, clergy, someone else?  1. Name: Cat (friend)                Phone: in pt's phone  2. Name: Madison (mom)  Phone: in pt's phone  3. Name: Solomon (mentor)  Phone: in pt's phone     Professionals or Behavioral Health agencies I can contact during a crisis: Who can I call for help - for example, my doctor, my psychiatrist, my psychologist, a mental health provider, a suicide hotline?  1. Clinician Name: esthela Lim    Phone: [phone]      Clinician Pager or Emergency Contact #: [phone]    2. Clinician Name: [facility]   Phone: [phone]      Clinician Pager or Emergency Contact #: [phone]    3. Suicide Prevention Lifeline: 988 call or text     4. Local Behavioral Health Emergency Services -  for example, [facility], [facility] suicide hotline, [facility] Hotline: [facility]      Emergency Services Address: [address] [place] [address]       Emergency Services

## 2025-06-20 NOTE — GROUP NOTE
Group Therapy Note    Date: 6/20/2025    Group Start Time:  9:40 AM  Group End Time: 10:30 AM  Group Topic: Process Group - Outpatient    [facility]    Tala Tanner MSW        Group Therapy Note    Patients engaged in introductions with new group member. Patients were given space and time to openly process thoughts, feelings, and experiences with peers. Patients were encouraged to offer feedback, support, and reflection to peers sharing. Patients discussed trust and complex family relationships.    Attendees: 5       Patient's Goal:  Discuss thoughts, feelings, an experiences, support peers    Notes:  Pt engaged actively in process group. Pt engaged in introductions with peers. Pt offered feedback and support to peers, discussing family dynamics. Pt identified the importance of boundaries and communication. Pt will continue to practice identifying emotions and supporting peers.    Status After Intervention:  Unchanged    Participation Level: Active Listener and Interactive    Participation Quality: Appropriate, Attentive, Sharing, and Supportive      Speech:  normal      Thought Process/Content: Logical  Linear      Affective Functioning: Congruent      Mood: euthymic      Level of consciousness:  Alert, Oriented x4, and Attentive      Response to Learning: Able to verbalize current knowledge/experience, Capable of insight, and Progressing to goal      Endings: None Reported    Modes of Intervention: Support      Discipline Responsible: /Counselor      Signature:  MIESHA Garrison

## 2025-06-20 NOTE — GROUP NOTE
Group Therapy Note    Date: 6/20/2025    Group Start Time:  1:10 PM  Group End Time:  2:00 PM  Group Topic: Psychoeducation    [facility]    Tala Tanner MSW        Group Therapy Note    Patients were asked to present and discuss their emotions wheels from a previous group. Patients were given educational materials about distress tolerance skills. Patients were encouraged to identify ways skills would be useful, barriers, and how to modify skills to be effective.    Attendees: 5       Patient's Goal:  Engage with peers, identify and discuss coping skills, understand distress tolerance    Notes:  Pt engaged actively in psychoeucation group. Pt identified feeling passionate and uneasy from her emotions wheel. Pt identified using pros and cons list to clarify choices. Pt identified using extreme sensations such as old showers and sour candy to change body sensations during panic. Pt identified ways working in extreme environments and in hot weather helps to cope with anxiety. Pt will continue to practice identifying and using coping skills.    Status After Intervention:  Unchanged    Participation Level: Active Listener and Interactive    Participation Quality: Appropriate, Attentive, and Sharing      Speech:  normal      Thought Process/Content: Logical  Linear      Affective Functioning: Congruent      Mood: euthymic      Level of consciousness:  Alert, Oriented x4, and Attentive      Response to Learning: Able to verbalize current knowledge/experience, Able to verbalize/acknowledge new learning, Capable of insight, and Progressing to goal      Endings: None Reported    Modes of Intervention: Education and Exploration      Discipline Responsible: /Counselor      Signature:  MIESHA Garrison

## 2025-06-20 NOTE — BH NOTE
Headbanging  [] Other    [] Other     HOMICIDAL IDEATION/IMPULSIVITY  [x] Denies [] With plan (describe): [] Without plan   [] Intended victim:   [] Victim notified by whom:       DO YOU HAVE A WEAPON(S) AT HOME?  [x] Yes  [] No  Type of Weapons: Guns, knives, all locked up. Patient has previously worked as a historical re-enactor.    SUBSTANCE USE/ABUSE  Check ALL that apply  Alcohol use: [x] Yes (socially)  [] No  Legal consequences: [] Yes (describe)  [x] No  How has your use affected you: n/a    Past treatment (where/when): n/a    OTHER SUBSTANCE USE  Type Length of Use Amount/  Frequency Date Last Used   [x] Marijuana   Nightly     [] Cocaine/crack      [x] Caffeine  Daily (2 cups coffee)    [x] Tobacco  Vaping--daily, wants to quit     [] Prescription      [] Opiates      [] Other (describe)              EDUCATION/WORK HISTORY  Highest level of education completed: high school    [x] Intelligence appears to be consistent with age/education     [] Intelligence appears to be slow or developmentally delayed     MEDICATIONS  List medications (incude prescription, over the counter, inhalers, vitamins/minerals/herbal supplements).    Name Prescribed By Dose Times Taken Last Time Taken Understands reasons for medications   Ativan 0.5mg 3x daily PRN    2 months ago [x] Yes  [] No   Hydroxyzine 10mg 2.5tabs 3x daily PRN    Last night  [x] Yes  [] No        [] Yes  [] No        [] Yes  [] No        [] Yes  [] No        [] Yes  [] No        [] Yes  [] No        [] Yes  [] No     PREFERRED PHARMACY: [facility] on [address]     SLEEP  [x] Yes  [] No   Problems sleeping  [x] Yes  [] No   Frequent night awakening  [x] Yes  [] No   Difficulty falling asleep    Sleeps 7-8 hours daily    NUTRITION  Do you have any food allergies? [] Yes  [x] No  If Yes, describe  Have you experienced any unintended weight loss/gain of 10lbs+ in the past 3 months? [] Yes  [x] No   Have you experienced a recent decrease of food intake

## 2025-06-20 NOTE — GROUP NOTE
Group Therapy Note    Date: 6/20/2025    Group Start Time: 12:00 PM  Group End Time:  1:00 PM  Group Topic: Psychotherapy    [facility]    Carina Garcia MSW        Group Therapy Note    Attendees: 5    Writer facilitated creative expression therapeutic group this afternoon. Writer opened by explaining the personalized emotion wheel activity to patients, then provided them time and materials to work on creating theirs. Writer prompted patients to engage with the emotions as they created, and facilitated creative expression until the end of group.        Patient's Goal:  Participate in group therapy, practice using creative expression to explore emotions, build rapport and connection with peers.     Notes:  Patient engaged well in group this afternoon. Writer observed her participating appropriately in the group, and she presented as bright and interactive with her peers throughout. Patient will continue with identified treatment goals in further groups.       Status After Intervention:  Unchanged    Participation Level: Active Listener and Interactive    Participation Quality: Appropriate, Attentive, and Supportive      Speech:  normal      Thought Process/Content: Logical      Affective Functioning: Congruent      Mood: euthymic      Level of consciousness:  Alert and Oriented x4      Response to Learning: Able to verbalize current knowledge/experience, Capable of insight, and Progressing to goal      Endings: None Reported    Modes of Intervention: Socialization, Exploration, and Activity      Discipline Responsible: /Counselor      Signature:  MIESHA Perez       Problem: PAIN - ADULT  Goal: Verbalizes/displays adequate comfort level or baseline comfort level  Description: Interventions:  - Encourage patient to monitor pain and request assistance  - Assess pain using appropriate pain scale  - Administer analgesics based on type and severity of pain and evaluate response  - Implement non-pharmacological measures as appropriate and evaluate response  - Consider cultural and social influences on pain and pain management  - Notify physician/advanced practitioner if interventions unsuccessful or patient reports new pain  Outcome: Progressing     Problem: INFECTION - ADULT  Goal: Absence or prevention of progression during hospitalization  Description: INTERVENTIONS:  - Assess and monitor for signs and symptoms of infection  - Monitor lab/diagnostic results  - Monitor all insertion sites, i.e. indwelling lines, tubes, and drains  - Monitor endotracheal if appropriate and nasal secretions for changes in amount and color  - [place] appropriate cooling/warming therapies per order  - Administer medications as ordered  - Instruct and encourage patient and family to use good hand hygiene technique  - Identify and instruct in appropriate isolation precautions for identified infection/condition  Outcome: Progressing     Problem: SAFETY ADULT  Goal: Patient will remain free of falls  Description: INTERVENTIONS:  - Educate patient/family on patient safety including physical limitations  - Instruct patient to call for assistance with activity   - Consult OT/PT to assist with strengthening/mobility   - Keep Call bell within reach  - Keep bed low and locked with side rails adjusted as appropriate  - Keep care items and personal belongings within reach  - Initiate and maintain comfort rounds  - Make Fall Risk Sign visible to staff  - Apply yellow socks and bracelet for high fall risk patients  - Consider moving patient to room near nurses station  Outcome: Progressing  Goal: Maintain or  return to baseline ADL function  Description: INTERVENTIONS:  -  Assess patient's ability to carry out ADLs; assess patient's baseline for ADL function and identify physical deficits which impact ability to perform ADLs (bathing, care of mouth/teeth, toileting, grooming, dressing, etc.)  - Assess/evaluate cause of self-care deficits   - Assess range of motion  - Assess patient's mobility; develop plan if impaired  - Assess patient's need for assistive devices and provide as appropriate  - Encourage maximum independence but intervene and supervise when necessary  - Involve family in performance of ADLs  - Assess for home care needs following discharge   - Consider OT consult to assist with ADL evaluation and planning for discharge  - Provide patient education as appropriate  Outcome: Progressing  Goal: Maintains/Returns to pre admission functional level  Description: INTERVENTIONS:  - Perform AM-PAC 6 Click Basic Mobility/ Daily Activity assessment daily.  - Set and communicate daily mobility goal to care team and patient/family/caregiver.   - Collaborate with rehabilitation services on mobility goals if consulted    - Record patient progress and toleration of activity level   Outcome: Progressing     Problem: DISCHARGE PLANNING  Goal: Discharge to home or other facility with appropriate resources  Description: INTERVENTIONS:  - Identify barriers to discharge w/patient and caregiver  - Arrange for needed discharge resources and transportation as appropriate  - Identify discharge learning needs (meds, wound care, etc.)  - Arrange for interpretive services to assist at discharge as needed  - Refer to Case Management Department for coordinating discharge planning if the patient needs post-hospital services based on physician/advanced practitioner order or complex needs related to functional status, cognitive ability, or social support system  Outcome: Progressing     Problem: Knowledge Deficit  Goal:  Patient/family/caregiver demonstrates understanding of disease process, treatment plan, medications, and discharge instructions  Description: Complete learning assessment and assess knowledge base.  Interventions:  - Provide teaching at level of understanding  - Provide teaching via preferred learning methods  Outcome: Progressing     Problem: Prexisting or High Potential for Compromised Skin Integrity  Goal: Skin integrity is maintained or improved  Description: INTERVENTIONS:  - Identify patients at risk for skin breakdown  - Assess and monitor skin integrity  - Assess and monitor nutrition and hydration status  - Monitor labs   - Assess for incontinence   - Turn and reposition patient  - Assist with mobility/ambulation  - Relieve pressure over bony prominences  - Avoid friction and shearing  - Provide appropriate hygiene as needed including keeping skin clean and dry  - Evaluate need for skin moisturizer/barrier cream  - Collaborate with interdisciplinary team   - Patient/family teaching  - Consider wound care consult   Outcome: Progressing

## 2025-06-20 NOTE — GROUP NOTE
Group Therapy Note    Date: 6/20/2025    Group Start Time: 10:40 AM  Group End Time: 11:30 AM  Group Topic: Cognitive Skills    [facility]    Abbie Llamas LCSW        Group Therapy Note  Writer facilitated a cognitive skills group that focused on values exploration. Pts were encouraged to explore their values, define them, identify their importance and the personal success at engaging in those values. Pts were encouraged to engage in a group reflection.   Attendees: 5       Patient's Goal:  Pt will continue to explore personal values and their importance.     Notes: Pt engaged in the group evidenced by completing the values exploration handout.Pt shared that she like the handout and identified that she was giving too much energy to work and knows that she needs to find balance to increase taking care of herself. Pt was able to provide support and feedback to her peers while they shared. Pt will continue to explore personal values and their importance.     Status After Intervention:  Unchanged    Participation Level: Active Listener and Interactive    Participation Quality: Appropriate and Sharing      Speech:  normal      Thought Process/Content: Logical      Affective Functioning: Congruent      Mood: euthymic      Level of consciousness:  Alert and Oriented x4      Response to Learning: Able to verbalize current knowledge/experience and Progressing to goal      Endings: None Reported    Modes of Intervention: Exploration and Clarifying      Discipline Responsible: /Counselor      Signature:  Abbie Llamas LCSW

## 2025-06-20 NOTE — PROGRESS NOTES
Writer met with patient from 8:40am until 9:45am for intake assessment. Patient denied SI/HI or AH/VH. No current alcohol or opiate use so writer did not complete COWS or AUDIT C. Patient declined to sign any TELLY at this point but is open to coming back to it.     MIESHA Perez

## 2025-06-23 ENCOUNTER — HOSPITAL ENCOUNTER (OUTPATIENT)
Facility: HOSPITAL | Age: 25
Setting detail: RECURRING SERIES
Discharge: HOME | End: 2025-06-26
Payer: MEDICAID

## 2025-06-23 VITALS
SYSTOLIC BLOOD PRESSURE: 119 MMHG | HEART RATE: 91 BPM | TEMPERATURE: 98 F | DIASTOLIC BLOOD PRESSURE: 91 MMHG | OXYGEN SATURATION: 100 %

## 2025-06-23 PROCEDURE — 90853 GROUP PSYCHOTHERAPY: CPT

## 2025-06-23 RX ORDER — HYDROXYZINE HYDROCHLORIDE 25 MG/1
25 TABLET, FILM COATED ORAL 3 TIMES DAILY PRN
Qty: 60 TABLET | Refills: 0 | Status: SHIPPED | OUTPATIENT
Start: 2025-06-23

## 2025-06-23 NOTE — GROUP NOTE
Group Therapy Note    Date: 6/23/2025    Group Start Time:  2:00 PM  Group End Time:  2:45 PM  Group Topic: Wrap-Up    [facility]    Carina Garcia MSW        Group Therapy Note    Attendees: 6    Writer opened wrap up group by asking patients to fill out their wrap up sheets for the day. Writer then asked patients to identify 1-2 words to describe how they are feeling and prompted each patient to share. To end, writer then prompted patients to set an intention based on that feeling, not to necessarily change it but to care for themselves within it. Writer gave some examples and allowed time for reflection and brief discussion before time was up.        Patient's Goal:  Participate in group therapy, complete check out sheet, engage in reflection about the day.       Notes:  Patient engaged well in group this afternoon. She completed her check out sheet, declining any safety concerns. Patient shared about feeling anxious this afternoon, related to the drive home. She reflected about factors she does have control over, and an item of self care she can do this afternoon. Patient will continue with identified treatment goals in further groups.      Status After Intervention:  Improved    Participation Level: Active Listener and Interactive    Participation Quality: Appropriate, Attentive, Sharing, and Supportive      Speech:  normal      Thought Process/Content: Logical      Affective Functioning: Congruent      Mood: euthymic      Level of consciousness:  Alert and Oriented x4      Response to Learning: Able to verbalize current knowledge/experience, Capable of insight, and Progressing to goal      Endings: None Reported    Modes of Intervention: Support, Socialization, and Exploration      Discipline Responsible: /Counselor      Signature:  MIESHA Perez

## 2025-06-23 NOTE — GROUP NOTE
Group Therapy Note    Date: 6/23/2025    Group Start Time:  9:40 AM  Group End Time: 10:30 AM  Group Topic: Process Group - Outpatient    [facility]    Carina Garcia MSW        Group Therapy Note    Attendees: 6    Writer facilitated process group this morning. Writer opened by encouraging continued processing from the previous group. Writer then facilitated continued sharing and discussion, and ended with a reflection about \"negative\" emotions.        Patient's Goal:  Participate in group therapy, engage in open sharing and processing with peers, practice building insight and self-awareness      Notes:  Patient engaged well in group this morning. She presented as attentive to and interactive with peers throughout the session, and provided verbal reflection and encouragement for a peer sharing about anxiety. Patient will continue with identified treatment goals in further groups.       Status After Intervention:  Unchanged    Participation Level: Active Listener and Interactive    Participation Quality: Appropriate, Attentive, and Supportive      Speech:  normal      Thought Process/Content: Logical      Affective Functioning: Congruent      Mood: euthymic      Level of consciousness:  Alert and Oriented x4      Response to Learning: Able to verbalize current knowledge/experience, Capable of insight, and Progressing to goal      Endings: None Reported    Modes of Intervention: Support, Socialization, and Exploration      Discipline Responsible: /Counselor      Signature:  MIESHA Perez

## 2025-06-23 NOTE — GROUP NOTE
Group Therapy Note    Date: 6/23/2025    Group Start Time:  9:00 AM  Group End Time:  9:40 AM  Group Topic: Community Meeting    [facility]    Yuliana Burger LCSW        Group Therapy Note    Attendees: 6  Writer facilitated community check in group this morning. Writer opened by encouraging patients to complete their check in sheet for the day and their lunch ticket for tomorrow. Writer then checked in with patients while vitals were still being taken. Following this, writer facilitated brief interactive activity Anjum Acosta [employer] which allow Pts to reflective practice,  and identifying positives, potential, and challenges.       Patient's Goal:   Participate in group therapy, complete check in sheet, build rapport with peers.       Notes:  Patient engaged well in group this morning. She completed her check in sheet, denying  SI thoughts of self harm.  Patient presented as overall bright and interactive with peers. Patient will continue with identified treatment goals in further groups       Status After Intervention:  Unchanged    Participation Level: Active Listener and Interactive    Participation Quality: Appropriate and Attentive      Speech:  normal      Thought Process/Content: Logical      Affective Functioning: Congruent      Mood: euthymic      Level of consciousness:  Alert, Oriented x4, and Attentive      Response to Learning: Able to verbalize current knowledge/experience      Endings: None Reported    Modes of Intervention: Support, Socialization, Exploration, and Clarifying      Discipline Responsible: /Counselor      Signature:  Yuliana Burger LCSW

## 2025-06-23 NOTE — GROUP NOTE
Group Therapy Note    Date: 6/23/2025    Group Start Time: 10:40 AM  Group End Time: 11:30 AM  Group Topic: Cognitive Skills    [facility]    Tala Tanner MSW        Group Therapy Note    Patients were given educational materials about the steps of processing thoughts and emotions. Patients were asked to identify strategies for naming thoughts and emotions, feeling emotions effectively, releasing emotions, and redirecting behaviors. Patients identifying ways to use coping skills and practice mindfulness.    Attendees: 6       Patient's Goal:  Identify coping skills, discuss processing emotions    Notes:  Pt engaged in cognitive skills group. Pt identified ways to identify cognitive distortions. Pt identified coping skills for processing emotions effectively. Pt will continue to practice identifying and processing emotions.    Status After Intervention:  Unchanged    Participation Level: Active Listener and Interactive    Participation Quality: Appropriate, Attentive, and Sharing      Speech:  normal      Thought Process/Content: Logical  Linear      Affective Functioning: Congruent      Mood: euthymic      Level of consciousness:  Alert, Oriented x4, and Attentive      Response to Learning: Able to verbalize current knowledge/experience, Able to verbalize/acknowledge new learning, and Progressing to goal      Endings: None Reported    Modes of Intervention: Education and Support      Discipline Responsible: /Counselor      Signature:  MIESHA Garrison

## 2025-06-23 NOTE — CARE COORDINATION
and patient met to initiate treatment plan)   Short Term Goal 2   Short Term Goal 2 Client will reduce frequency and intensity of unsafe behavior  (Patient will reduce vaping)   Baseline Functioning Patient reports vaping daily, which she would like to reduce   Target Patient will reduce vaping behaviors with the goal of eventually quitting   Objectives Client will participate in individual therapy;Client will participate in group therapy   Intervention  Acknowledge client strengths  (Patient will acknowledge positive coping skills daily on check in sheets)   Frequency daily   Measured by Self report;Staff observation;Other (comment)  (check in and check out sheets)   Staff Responsible Clinical staff   Intervention 2 Group therapy  (Patient will attend at least 3 skills related groups per week and reflect on applying skills to her goal)   Frequency 3x weekly   Measured by Self report;Staff observation;Other (comment)  (EHR review)   Staff Responsible Clinical staff   Intervention 3 Monitor medications  (Patient will meet with physician at least once per week to maintain medications and discuss any other interventions for cravings.)   Frequency at least once weekly   Measured by Self report;Staff observation;Other (comment)  (EHR review)   STG Goal 2 Status: Patient Appears to be  Progressing toward treatment plan goal  (6/23 writer and patient met to initiate treatment plan today)   Crisis/Safety/Discharge Plan   Crisis/Safety Plan Standard program interventions and protocol   Comprehensive Assessment Completion Date 06/20/25   Discharge Plan Patient will discharge 7/3 due to work and holiday, will be provided follow up resources     Writer and pt met to initiate treatment plan from 1:10 to 1:20pm today.     MIESHA Perez

## 2025-06-23 NOTE — GROUP NOTE
Group Therapy Note    Date: 6/23/2025    Group Start Time: 12:00 PM  Group End Time:  1:00 PM  Group Topic: Psychoeducation    [facility] Abbie Willingham LCSW        Group Therapy Note  Writer facilitated a treatment planning group focusing on the ACT matrix and self-exploration. Writer reviewed the ACT matrix and had pts complete their own and shared. Pts then engaged in a journaling activity and were provided space to share.   Attendees: 6       Patient's Goal:  Pt will continue to increase insight on the ACT matrix and things they can do to take them toward things they value.     Notes: Pt engaged in the group evidenced by spontaneous participation. Pt completed her matrix and was able to engage in the journaling. Pt shared her journal/ thoughts with the group and reported that she was struggling with quitting vaping and all the other stressors in her life. Pt will continue to increase insight on the ACT matrix and things they can do to take them toward things they value.     Status After Intervention:  Unchanged    Participation Level: Active Listener and Interactive    Participation Quality: Appropriate and Sharing      Speech:  normal      Thought Process/Content: Logical      Affective Functioning: Congruent      Mood: anxious      Level of consciousness:  Alert and Oriented x4      Response to Learning: Able to verbalize current knowledge/experience and Progressing to goal      Endings: None Reported    Modes of Intervention: Exploration and Clarifying      Discipline Responsible: /Counselor      Signature:  Abbie Llamas LCSW

## 2025-06-23 NOTE — GROUP NOTE
Group Therapy Note    Date: 6/23/2025    Group Start Time:  1:10 PM  Group End Time:  2:00 PM  Group Topic: Psychoeducation    [facility]    Tala Tanner MSW        Group Therapy Note    Patients were given educational materials about apologizing. Patients identified characteristics of sincere and insincere apologies. Patients discussed elements of effective and ineffective apologies. Patients discussed overapologizing and ways to redirect anxiety.    Attendees: 5       Patient's Goal:  Identify parts of apologies, discussed experiences, practice communication skills     Notes:  Pt engaged actively in psychoeducation group. Pt identified experiences with overapologizing. Pt discussed using gratitude to replace unnecessary apologies. Pt discussed ways to communicate intention. Pt identified importance of identifying the wrongdoing and taking accountability. Pt will continue to practice communication skills.    Status After Intervention:  Unchanged    Participation Level: Active Listener and Interactive    Participation Quality: Appropriate, Attentive, and Sharing      Speech:  normal      Thought Process/Content: Logical  Linear      Affective Functioning: Congruent      Mood: euthymic      Level of consciousness:  Alert, Oriented x4, and Attentive      Response to Learning: Able to verbalize current knowledge/experience, Able to verbalize/acknowledge new learning, Capable of insight, and Progressing to goal    Endings: None Reported    Modes of Intervention: Education and Exploration      Discipline Responsible: /Counselor      Signature:  MIESHA Garrison

## 2025-06-23 NOTE — H&P
[place] [address]  [address]                                PSYCH H&P      PATIENT NAME: ABIMAEL CARRASCO               : 2000  MED REC NO: [id number]                       ROOM:   ACCOUNT NO: [id number]                       ADMIT DATE: 2025  PROVIDER: Flaquita Ellis MD      CHIEF COMPLAINT:  \"I feel okay today.\"    HISTORY OF PRESENT ILLNESS:  The patient is a 24-year-old  female, who is currently admitted to the partial hospitalization program at [facility].  She was referred to us for further management and increasing levels of anxiety.  She reports that she feels as if always tense and has been having limited symptom panic attacks.  She states that she felt as if she could not go with her life anymore and decided to come to the hospital.  She denies any active suicidal ideation or plan.  States that she is just interested in attending partial for therapy and does not want to make any medication changes.  Most recently, she has been taking hydroxyzine 25 mg.  She takes at bedtime or during the evening to help with anxiety and sleep.  States that she takes 0.25 mg of lorazepam and her anxiety gets to the point that she feels it is out of control and estimates that she takes 1 tablet every 1 or 2 months.  She notes that she smokes marijuana every night and has been doing this for nearly 8 years.  She notes that she smokes about 1 or 2 bowls of marijuana and admits that it sometimes makes her more anxious or more depressed, but she struggles to stop doing this.  She denies regular or heavy use of alcohol or other recreational substances.  Denies any psychotic symptoms.    PAST MEDICAL HISTORY:  Reviewed as per the history and physical exam.      Past Medical History:   Diagnosis Date    Concussion     Constipation     Dental disorder     Irritable bowel syndrome     Murmur     Psychiatric disorder

## 2025-06-24 ENCOUNTER — HOSPITAL ENCOUNTER (OUTPATIENT)
Facility: HOSPITAL | Age: 25
Setting detail: RECURRING SERIES
Discharge: HOME | End: 2025-06-27
Payer: MEDICAID

## 2025-06-24 VITALS
SYSTOLIC BLOOD PRESSURE: 131 MMHG | TEMPERATURE: 98.2 F | DIASTOLIC BLOOD PRESSURE: 91 MMHG | OXYGEN SATURATION: 100 % | HEART RATE: 92 BPM

## 2025-06-24 PROCEDURE — 90853 GROUP PSYCHOTHERAPY: CPT

## 2025-06-24 NOTE — GROUP NOTE
Group Therapy Note    Date: 6/24/2025    Group Start Time: 12:00 PM  Group End Time:  1:00 PM  Group Topic: Psychoeducation    [facility] Abbie Willingham LCSW        Group Therapy Note  Writer facilitated a treatment planning group focusing on resiliency. Pt completed a resiliency questionnaire and explored their concept of resiliency. Writer then reviewed 10 different ways to boost resiliency and encouraged pts to identify where they can grow.    Attendees: 7       Patient's Goal:  Pt will continue to explore personal resiliency and ways to increase resilience     Notes: Pt engaged in the group evidenced by appropriate eye contact. Pt completed the questionnaire and explored her experience with resiliency. Pt reflected on how it will be important for her to better manage stress in order to be more resilient. Pt will continue to explore personal resiliency and ways to increase resilience     Status After Intervention:  Unchanged    Participation Level: Active Listener and Interactive    Participation Quality: Appropriate and Sharing      Speech:  normal      Thought Process/Content: Logical      Affective Functioning: Congruent      Mood: euthymic      Level of consciousness:  Alert and Oriented x4      Response to Learning: Able to verbalize current knowledge/experience and Progressing to goal      Endings: None Reported    Modes of Intervention: Education, Exploration, and Clarifying      Discipline Responsible: /Counselor      Signature:  Abbie Llamas LCSW

## 2025-06-24 NOTE — PROGRESS NOTES
MEDICATION GROUP THERAPY PROGRESS NOTE    Kaylee Berrios was present for medication group.    GROUP TIME: Tuesday 1:10 PM - 2:00 PM    TOPIC: Insomnia    PERSONAL GOAL FOR PARTICIPATION: To be present for group, participate in discussion, and answer patient-directed questions.    GOAL ORIENTATION: Personal    THERAPEUTIC INTERVENTIONS REVIEWED AND DISCUSSED: The following topics were presented: common causes of insomnia, symptoms of insomnia, why sleep is important, negative effects of untreated insomnia, nonpharmacologic treatment options for insomnia including good sleep hygiene practices, over-the-counter and prescription medications used for treating insomnia including key side effects.    IMPRESSION OF PARTICIPATION:  Kaylee was an active participant in group discussions.  Occasionally spoke for writer answering peers questions but did divert back to writer for validation/input.       Karla Oliver [facility]  Clinical Pharmacy Specialist, Behavioral Health  Desk: [phone] ([phone]  Pharmacy: [phone] ([phone]

## 2025-06-24 NOTE — BH NOTE
Pt came to writer asking if she could leave early due to concerns about her car being too hot to drive in rush hour traffic because she has no AC. Pt reported feeling that leaving early would result in her sitting in less traffic and reduce her overheating in her car. Writer validated pt feelings and asked pt if she felt like she was making an informed choice and if her overall feelings of anxiety are impacting her decision making. Pt reported feeling partially influenced by anxiety and feeling unsure of her decision. Writer informed pt she can leave whenever she likes because it is a voluntary program, and encouraged pt to decide whether or not it is worth it to engage in [facility] or to go home early. Pt identified wanting to stay at [facility] for the day.    MIESHA Garrison

## 2025-06-24 NOTE — GROUP NOTE
Group Therapy Note    Date: 6/24/2025    Group Start Time: 10:40 AM  Group End Time: 11:30 AM  Group Topic: Cognitive Skills    [facility]    Yuliana Burger LCSW        Group Therapy Note    Attendees: 6  Writer facilitated group centered around the  Role of Physical Activity on Mental Health and Well-Being. Writer opened by providing opportunity for patients to complete a wellness assessment .Writer then asked patients about how they currently engage with physical activity and provided the information sheet. Writer reviewed the information and shared media clip about the exercise happiness paradox. Pts were encouraged to review and share their reflections and any insights that they gained from the exercise.         Patient's Goal:  Participate in group  and make the connection between physical fitness and mental wellness.     Notes:   Patient engaged well in group this afternoon. She presented as open and interactive with peers, and attentive to the information as evidenced by appropriate eye contact and nodding. Patient asked good clarifying questions and shared how she plans to evaluate and implement regular exercise into her daily routine. Patient will continue with identified treatment goals in further groups.       Status After Intervention:  Improved    Participation Level: Active Listener and Interactive    Participation Quality: Appropriate, Attentive, Sharing, and Supportive      Speech:  normal      Thought Process/Content: Logical      Affective Functioning: Congruent      Mood: euthymic      Level of consciousness:  Alert, Oriented x4, and Attentive      Response to Learning: Able to verbalize/acknowledge new learning and Able to retain information      Endings: None Reported    Modes of Intervention: Support, Socialization, and Exploration      Discipline Responsible: /Counselor      Signature:  Yuliana Burger LCSW

## 2025-06-24 NOTE — GROUP NOTE
Group Therapy Note    Date: 6/24/2025    Group Start Time:  2:00 PM  Group End Time:  2:45 PM  Group Topic: Wrap-Up    [facility]    Tala Tanner MSW        Group Therapy Note    Patients were given a wrap up sheet to assess for mood, SI, and HI. Patients were asked to reflect on learnings from the day. Patients engaged in an affirmations yearbook activity and were asked to decorate a border on a page. Patients passed their pages around the room and were asked to writer affirmations or kind words for their peers.    Attendees: 6       Patient's Goal:  Complete wrap up sheet, engage with peers, identify new learning, use affirmations effectively    Notes:  Pt engaged actively in wrap up group. Pt identified no SI/HI on wrap up sheet. Pt discussed previous experiences with affirmations activity. Pt engaged in affirmations activity. Pt engaged actively with peers throughout group. Pt will continue to practice reflecting and connecting with peers.    Status After Intervention:  Unchanged    Participation Level: Active Listener and Interactive    Participation Quality: Appropriate, Attentive, and Supportive      Speech:  normal      Thought Process/Content: Logical  Linear      Affective Functioning: Congruent      Mood: euthymic      Level of consciousness:  Alert, Oriented x4, and Attentive      Response to Learning: Able to verbalize current knowledge/experience and Progressing to goal      Endings: None Reported    Modes of Intervention: Support and Activity      Discipline Responsible: /Counselor      Signature:  MIESHA Garrison

## 2025-06-24 NOTE — GROUP NOTE
Group Therapy Note    Date: 6/24/2025    Group Start Time:  9:00 AM  Group End Time:  9:40 AM  Group Topic: Community Meeting    [facility]    Tala Tanner MSW        Group Therapy Note    Patients were given a check in sheet to assess for mood, SI, and HI. Patients were asked to discuss thoughts and feeling from the previous evening and this morning. Patients were encouraged to offer feedback and support to peers. Patients identified coping skills for times of stress. Patients discussed complex family dynamics and support.    Attendees: 6       Patient's Goal:  Complete check in sheet, engage with peers, reflect on experiences    Notes:  Pt engaged minimally in check in group. Pt left group after a few minutes and returned with 5 minutes left in group. Pt identified having a fuad attack in the other room and being supported by staff. Pt reported wanting to leave due to feelings of stress due to high temperatures in her car on the way to and from [facility]. Pt will continue to practice engaging in groups.    Status After Intervention:  Unchanged    Participation Level: Minimal    Participation Quality: Appropriate and Attentive      Speech:  normal      Thought Process/Content: Logical  Linear      Affective Functioning: Congruent      Mood: anxious and euthymic      Level of consciousness:  Alert, Oriented x4, and Attentive      Response to Learning: Able to verbalize current knowledge/experience and Progressing to goal      Endings: None Reported    Modes of Intervention: Support and Exploration      Discipline Responsible: /Counselor      Signature:  MIESHA Garrison

## 2025-06-24 NOTE — BH NOTE
Writer entered the treatment area at 9:20am and witnessed patient having a panic attack, supported by Yuliana HOLT. Writer provided additional support including encouragement, grounding via temperature change (holding ice), and distraction when the initial intensity had passed off. Patient shared that she had initially started to feel stressed due to the heat and not having air conditioning in her car, which caused her phone to overheat on the way home yesterday. She discussed potentially leaving early today, and other options for making sure she gets home safely. Patient presented as significantly more regulated and returned to group at 9:35am.     MIESHA Perez

## 2025-06-24 NOTE — GROUP NOTE
Group Therapy Note    Date: 6/24/2025    Group Start Time:  9:40 AM  Group End Time: 10:30 AM  Group Topic: Process Group - Outpatient    [facility]    Carina Garcia MSW        Group Therapy Note    Attendees: 5    Writer facilitated process group this morning. Writer opened by prompting patients and reflecting the purpose of process group, then opening the floor for sharing. Writer facilitated resulting reflection and discussion until the end of group.        Patient's Goal:  Participate in group therapy, engage in open sharing and processing with peers, practice building insight and self-awareness      Notes:  Patient engaged well in group this morning. She presented as bright and attentive to peers, and provided good verbal support and encouragement for a peer who was sharing about their struggles. She stepped out toward the end of group but was present for more than half the time. Patient will continue with identified treatment goals in further groups.       Status After Intervention:  Unchanged    Participation Level: Active Listener and Interactive    Participation Quality: Appropriate, Attentive, and Supportive      Speech:  normal      Thought Process/Content: Logical      Affective Functioning: Congruent      Mood: anxious      Level of consciousness:  Alert and Oriented x4      Response to Learning: Able to verbalize current knowledge/experience, Capable of insight, and Progressing to goal      Endings: None Reported    Modes of Intervention: Support, Socialization, and Exploration      Discipline Responsible: /Counselor      Signature:  MIESHA Perez

## 2025-06-24 NOTE — BH NOTE
Upon arrival at the Partial Hospitalization Program ([facility]), the patient's vital signs were assessed. She presented with an elevated blood pressure and reported feeling irritable, attributing this to her vehicle's lack of air conditioning and the exacerbation of her anxiety symptoms. Despite the elevated blood pressure, the patient stated she felt well, aside from the stress related to the heat during her commute to and from therapy. The clinician informed the patient that the elevated blood pressure would be reported to the Medical Doctor (MD), which the patient acknowledged. The patient then proceeded to group therapy.  Within five minutes of entering group therapy, the patient abruptly exited, stating, \"It's too damn hot,\" and quickly departed the suite with her belongings. Shortly thereafter, the patient returned and requested to sit with the clinician to facilitate emotional regulation and redirection. The clinician acknowledged the patient and provided support. During this interaction, the patient began exhibiting symptoms consistent with a panic attack, including a racing heart, shortness of breath, chest pain, sweating, trembling, and dizziness. The clinician initiated grounding techniques and offered ice for self-regulation. The patient's assigned therapist subsequently intervened and remained with the patient to assist in managing the panic attack.      .Yuliana Burger LCSW

## 2025-06-25 ENCOUNTER — HOSPITAL ENCOUNTER (OUTPATIENT)
Facility: HOSPITAL | Age: 25
Setting detail: RECURRING SERIES
Discharge: HOME | End: 2025-06-28
Payer: MEDICAID

## 2025-06-25 VITALS
TEMPERATURE: 97.8 F | HEART RATE: 79 BPM | SYSTOLIC BLOOD PRESSURE: 125 MMHG | DIASTOLIC BLOOD PRESSURE: 87 MMHG | OXYGEN SATURATION: 100 %

## 2025-06-25 PROCEDURE — 90853 GROUP PSYCHOTHERAPY: CPT

## 2025-06-25 NOTE — GROUP NOTE
Group Therapy Note    Date: 6/25/2025    Group Start Time:  2:00 PM  Group End Time:  2:45 PM  Group Topic: Wrap-Up    [facility]    Carina Garcia MSW        Group Therapy Note    Attendees: 7    Writer facilitated wrap up group this afternoon. Writer opened by providing patients with their check out sheets for the day and encouraged them to fill them out. Writer then prompted patients to each share one thing they learned this week and one thing today that has brought them bubba. Writer facilitated sharing and discussion until the end of group.        Patient's Goal:  Participate in group therapy, complete check out sheet, engage in reflection about the day.       Notes:  Patient engaged well in group this afternoon. She completed her wrap up sheet, declining any safety concerns. She reflected about learning that she can go most of a day without vaping, and has been improving her confidence while driving, which she also found bubba in. She presented as overall bright and appropriately interactive with peers throughout the session. Patient will continue with identified treatment goals in further groups.     Status After Intervention:  Improved    Participation Level: Active Listener and Interactive    Participation Quality: Appropriate, Attentive, Sharing, and Supportive      Speech:  normal      Thought Process/Content: Logical      Affective Functioning: Congruent      Mood: euthymic      Level of consciousness:  Alert and Oriented x4      Response to Learning: Able to verbalize current knowledge/experience, Capable of insight, and Progressing to goal      Endings: None Reported    Modes of Intervention: Support, Socialization, and Exploration      Discipline Responsible: /Counselor      Signature:  MIESHA Perez

## 2025-06-25 NOTE — GROUP NOTE
Group Therapy Note    Date: 6/25/2025    Group Start Time: 10:40 AM  Group End Time: 11:30 AM  Group Topic: Cognitive Skills    [facility]    Carina Garcia, MSW        Group Therapy Note    Attendees: 7    Writer facilitated cognitive skills group centered around conflict resolution today. Writer opened by checking in with patients and celebrating one patient's sobriety achievement. Writer then moved into the topic and provided patients with the \"fair fighting rules\" information sheet. Writer asked each patient to take a turn reading through the sheet out loud. Writer then facilitated reflection and discussion about how fair fighting rules can be useful and how they may help patients with building healthy relationships.        Patient's Goal:  Participate in group therapy, increase knowledge and insight about conflict resolution, reflect on how better conflict resolution skills may affect their lives.     Notes:  Patient engaged well in group this morning. She presented as bright and attentive, and participated well in the reading activity. She reflected about how experiences in her life have impacted her relationship with conflict and how it is beginning to change now as she goes through her healing journey. Patient will continue with identified treatment goals in further groups.     Status After Intervention:  Improved    Participation Level: Active Listener and Interactive    Participation Quality: Appropriate, Attentive, and Sharing      Speech:  normal      Thought Process/Content: Logical      Affective Functioning: Congruent      Mood: euthymic      Level of consciousness:  Alert and Oriented x4      Response to Learning: Able to verbalize current knowledge/experience, Capable of insight, and Progressing to goal      Endings: None Reported    Modes of Intervention: Education, Exploration, and Problem-solving      Discipline Responsible: Social

## 2025-06-25 NOTE — GROUP NOTE
Group Therapy Note    Date: 6/25/2025    Group Start Time:  1:10 PM  Group End Time:  2:00 PM  Group Topic: Psychoeducation    [facility]    Tala Tanner MSW        Group Therapy Note    Patients were given educational materials about inner critic vs inner . Patients were asked to identify personal experiences with inner critic and inner  voices. Patients were asked to identify figures they associate with those voices. Patients discussed functions of inner critic and inner  voices and overall impacts on self-worth.    Attendees: 7       Patient's Goal:  Identify inner critic and  voices, discuss self esteem and self worth, challenge thoughts    Notes:  Pt engaged in psychoeducation group. Pt identified ways her inner critic is related to past trauma. Pt identified the role of the inner  in maintaining hope. Pt discussed ways she has found a working environment to support healthy self worth. Pt will continue to practice reflecting and challenging thoughts.    Status After Intervention:  Unchanged    Participation Level: Active Listener and Interactive    Participation Quality: Appropriate, Attentive, and Sharing      Speech:  normal      Thought Process/Content: Logical  Linear      Affective Functioning: Congruent      Mood: euthymic      Level of consciousness:  Alert, Oriented x4, and Attentive      Response to Learning: Able to verbalize current knowledge/experience, Able to verbalize/acknowledge new learning, Capable of insight, and Progressing to goal      Endings: None Reported    Modes of Intervention: Education and Exploration      Discipline Responsible: /Counselor      Signature:  MIESAH Garrison

## 2025-06-25 NOTE — GROUP NOTE
Group Therapy Note    Date: 6/25/2025    Group Start Time:  9:40 AM  Group End Time: 10:30 AM  Group Topic: Process Group - Outpatient    [facility]    Tala Tanner MSW        Group Therapy Note    Patients were given space and time to openly process thoughts, feelings, and experiences with peers. Patients were encouraged to identify emotions. Patients were encouraged to offer feedback and support to peers. Patients discussed difficult interpersonal dynamics.    Attendees: 7       Patient's Goal:  Process thoughts and feelings, support peers, identify emotions    Notes:  Pt engaged minimally in process group. Pt listened actively to peers, looking at speakers and reacting appropriately. Pt did not engage verbally in group discussion. Pt left group briefly and returned. Pt will continue to practice engaging in groups.    Status After Intervention:  Unchanged    Participation Level: Active Listener and Minimal    Participation Quality: Appropriate and Attentive      Speech:  normal      Thought Process/Content: Logical  Linear      Affective Functioning: Congruent      Mood: euthymic      Level of consciousness:  Alert, Oriented x4, and Attentive      Response to Learning: Progressing to goal      Endings: None Reported    Modes of Intervention: Support      Discipline Responsible: /Counselor      Signature:  MIESHA Garrison

## 2025-06-25 NOTE — GROUP NOTE
Group Therapy Note    Date: 6/25/2025    Group Start Time: 12:00 PM  Group End Time:  1:00 PM  Group Topic: Psychoeducation    [facility]    Yuliana Burger LCSW        Group Therapy Note    Attendees: 7  Writer facilitated a group about understanding and learning about trauma, how it may be triggered within themselves and others,most critically, how to recognize and manage distress through adaptive coping skills. To conclude group writer asked Pts to engage with creative expression and had the opportunity to create A tree of Life- Trauma Tree, which allowed Pts to who have experienced trauma a way to visualize their emotional responses to trauma.       Patient's Goal:  Participate in group therapy and feel connected again to their values, morals, relationships and life experiences.    Notes:   Pt engaged well in group this afternoon. She presented as open and interactive with peers, and attentive to the information as evidenced by appropriate eye contact and nodding. Patient asked good clarifying questions and shared about  her prior experiences with trauma and coping skills that she has identified to be helpful. Patient will continue with identified treatment goals in further groups.     Status After Intervention:  Improved    Participation Level: Active Listener and Interactive    Participation Quality: Appropriate, Attentive, and Sharing      Speech:  normal      Thought Process/Content: Logical      Affective Functioning: Congruent      Mood: anxious      Level of consciousness:  Alert, Oriented x4, and Attentive      Response to Learning: Able to verbalize/acknowledge new learning and Able to retain information      Endings: None Reported    Modes of Intervention: Support, Socialization, Exploration, and Clarifying      Discipline Responsible: /Counselor      Signature:  Yuliana Burger LCSW

## 2025-06-25 NOTE — GROUP NOTE
Group Therapy Note    Date: 6/25/2025    Group Start Time:  9:00 AM  Group End Time:  9:40 AM  Group Topic: Community Meeting    [facility]    bAbie Llamas LCSW        Group Therapy Note  Writer facilitated community group with a focus on assessing safety and pt processing. Writer assessed for safety by providing, collecting, and reviewing the morning check in forms. Writer encouraged pts to share about recent or upcoming events, to include stressors or positive developments. Writer encouraged peer feedback and discussion through open ended questions and therapeutic reflection.   Attendees: 7       Patient's Goal:  Pt will continue to practice disclosing safety concerns and engaging with peer support.     Notes: Pt engaged in the group evidenced by appropriate eye contact. Pt completed the check in sheet and denied any safety concerns. Pt listened to her peers as they shared. Pt will continue to practice disclosing safety concerns and engaging with peer support.     Status After Intervention:  Unchanged    Participation Level: Active Listener    Participation Quality: Appropriate      Speech:  normal      Thought Process/Content: Logical      Affective Functioning: Congruent      Mood: anxious      Level of consciousness:  Oriented x4      Response to Learning: Progressing to goal      Endings: None Reported    Modes of Intervention: Support      Discipline Responsible: /Counselor      Signature:  Abbie Llamas LCSW

## 2025-06-26 ENCOUNTER — HOSPITAL ENCOUNTER (OUTPATIENT)
Facility: HOSPITAL | Age: 25
Setting detail: RECURRING SERIES
Discharge: HOME | End: 2025-06-29
Payer: MEDICAID

## 2025-06-26 VITALS
SYSTOLIC BLOOD PRESSURE: 117 MMHG | HEART RATE: 89 BPM | OXYGEN SATURATION: 97 % | TEMPERATURE: 98.1 F | DIASTOLIC BLOOD PRESSURE: 80 MMHG

## 2025-06-26 PROCEDURE — 90853 GROUP PSYCHOTHERAPY: CPT

## 2025-06-26 NOTE — GROUP NOTE
Group Therapy Note    Date: 6/26/2025    Group Start Time: 10:40 AM  Group End Time: 11:30 AM  Group Topic: Cognitive Skills    [facility]    Carina Garcia MSW        Group Therapy Note    Attendees: 5    Writer co-facilitated group this morning with Clovis Wesley LCSW. Clovis opened the group with an icebreaker, then shared about his practice and what he does in the outpatient setting. Clovis then provided patients with an informational sheet about the different areas of wellness, and provided verbal psychoeducation on the topic, then writer and Clovis facilitated reflection and discussion to end the group.        Patient's Goal:  Participate in group therapy, engage with guest speaker, increase insight and understanding of the different areas of wellness.     Notes:  Patient engaged well in group this morning. She came in late but was present for more than half the time. Patient participated well in the reflection and discussion, and presented as somewhat fidgety and anxious. Patient will continue with identified treatment goals in further groups.     Status After Intervention:  Unchanged    Participation Level: Active Listener and Interactive    Participation Quality: Appropriate and Attentive      Speech:  normal      Thought Process/Content: Logical      Affective Functioning: Congruent      Mood: anxious      Level of consciousness:  Alert and Oriented x4      Response to Learning: Able to verbalize current knowledge/experience, Capable of insight, and Progressing to goal      Endings: None Reported    Modes of Intervention: Education, Exploration, and Activity      Discipline Responsible: /Counselor      Signature:  MIESHA Perez

## 2025-06-26 NOTE — GROUP NOTE
Group Therapy Note    Date: 6/26/2025    Group Start Time:  2:00 PM  Group End Time:  2:45 PM  Group Topic: Wrap-Up    [facility]    Tala Tanner MSW        Group Therapy Note    Patients were given wrap up sheet to assess for mood, SI, and HI. Patients were asked to reflect on and identify learning from the day. Patients were asked to engage in a creative affirmations activity. Patients folded a paper together and wrote affirmations inside of it to share with others. Patients were encouraged to reflect on the experience and engage with peers.    Attendees: 4       Patient's Goal:  Complete wrap up sheet, engage in creative activity, use affirmations    Notes:  Pt engaged in wrap up group. Pt identified no SI/HI on wrap up sheet. Pt declined to share what she had learned today. Pt engaged with peers during group. Pt engaged in creative affirmations activity. Pt will continue to practice using affirmations and engaging in groups.    Status After Intervention:  Unchanged    Participation Level: Active Listener and Interactive    Participation Quality: Appropriate and Attentive      Speech:  normal      Thought Process/Content: Logical  Linear      Affective Functioning: Congruent      Mood: euthymic      Level of consciousness:  Alert, Oriented x4, and Attentive      Response to Learning: Able to verbalize current knowledge/experience and Progressing to goal      Endings: None Reported    Modes of Intervention: Exploration and Activity      Discipline Responsible: /Counselor      Signature:  MIESHA Garrison

## 2025-06-26 NOTE — GROUP NOTE
Group Therapy Note    Date: 6/26/2025    Group Start Time:  9:40 AM  Group End Time: 10:30 AM  Group Topic: Process Group - Outpatient    [facility]    Abbie Llamas LCSW        Group Therapy Note  Writer facilitated process group. Writer encouraged pts to share issues on which they want support with problem solving, issues they need to process with peers, or events they want to celebrate. Writer prompted and supported peer feedback. Writer provided validation and feedback as well as utilized open ended questions to support further exploration of topics.   Attendees: 6       Patient's Goal:  Pt will continue to practice engaging in healthy processing and peer support.     Notes:  Pt was in and out of the group due to trying to regulate. When pt was in the room she was able to follow along with the conversation. Pt will continue to practice engaging in healthy processing and peer support.     Status After Intervention:  Unchanged    Participation Level: Active Listener    Participation Quality: Appropriate      Speech:  normal      Thought Process/Content: Logical      Affective Functioning: Congruent      Mood: anxious      Level of consciousness:  Oriented x4      Response to Learning: Progressing to goal      Endings: None Reported    Modes of Intervention: Support and Exploration      Discipline Responsible: /Counselor      Signature:  Abbie Llamas LCSW
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
## 2025-06-26 NOTE — GROUP NOTE
Group Therapy Note    Date: 6/26/2025    Group Start Time:  1:10 PM  Group End Time:  2:00 PM  Group Topic: Psychoeducation    [facility] Abbie Willingham LCSW        Group Therapy Note  Writer facilitated a psychoeducation group focusing on boundaries. Writer reviewed the difference between porous, rigid and healthy boundaries and different types of boundaries. Pts engaged in a group reflection of personal experiences and their engagement with boundaries.  Attendees: 4       Patient's Goal:  Pt will continue to insight into boundaries and ways to set healthy boundaries with themself and others.     Notes: Pt was in and out of group, and left to regulate emotionally but did not return to the group until the very end. Pt will continue to insight into boundaries and ways to set healthy boundaries with themself and others.      Status After Intervention:  n/a    Participation Level: None    Participation Quality: n/a      Speech:  normal      Thought Process/Content: Logical      Affective Functioning: Congruent      Mood: euthymic      Level of consciousness:  Oriented x4      Response to Learning: n/a      Endings: None Reported    Modes of Intervention: Education and Exploration      Discipline Responsible: /Counselor      Signature:  Abbie Llamas LCSW

## 2025-06-26 NOTE — GROUP NOTE
Group Therapy Note    Date: 6/26/2025    Group Start Time:  9:00 AM  Group End Time:  9:40 AM  Group Topic: Community Meeting    [facility]    Yuliana Burger LCSW        Group Therapy Note    Attendees: 6  Writer facilitated community group with a focus on assessing safety and pt processing. Writer assessed for safety by providing, collecting, and reviewing the morning check in forms. Writer encouraged pts to share about recent or upcoming events, to include stressors or positive developments. Writer encouraged peer feedback and discussion through open ended questions and therapeutic reflection         Patient's Goal:    Participate in group therapy, complete check in sheet, build rapport with peers.     Notes:  Patient engaged well in group this morning. She completed her check in sheet, denying  SI or thoughts of self harm. Patient presented as overall bright and interactive with peers,  Patient will continue with identified treatment goals in further groups.            Status After Intervention:  Unchanged    Participation Level: Active Listener and Interactive    Participation Quality: Appropriate, Attentive, Sharing, and Supportive      Speech:  normal      Thought Process/Content: Logical      Affective Functioning: Congruent      Mood: euthymic      Level of consciousness:  Alert, Oriented x4, and Attentive      Response to Learning: Able to verbalize current knowledge/experience, Able to verbalize/acknowledge new learning, and Able to retain information      Endings: None Reported    Modes of Intervention: Education, Support, Socialization, and Exploration      Discipline Responsible: /Counselor      Signature:  Yuliana Burger LCSW

## 2025-06-27 ENCOUNTER — HOSPITAL ENCOUNTER (OUTPATIENT)
Facility: HOSPITAL | Age: 25
Setting detail: RECURRING SERIES
Discharge: HOME | End: 2025-06-30
Payer: MEDICAID

## 2025-06-27 VITALS
HEART RATE: 72 BPM | SYSTOLIC BLOOD PRESSURE: 127 MMHG | DIASTOLIC BLOOD PRESSURE: 90 MMHG | TEMPERATURE: 97.5 F | OXYGEN SATURATION: 100 %

## 2025-06-27 PROCEDURE — 90853 GROUP PSYCHOTHERAPY: CPT

## 2025-06-27 PROCEDURE — 90832 PSYTX W PT 30 MINUTES: CPT
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
## 2025-06-27 NOTE — GROUP NOTE
Group Therapy Note    Date: 6/27/2025    Group Start Time:  9:00 AM  Group End Time:  9:40 AM  Group Topic: Community Meeting    [facility]    Abbie Llamas LCSW        Group Therapy Note  Writer facilitated community group with a focus on assessing safety and pt processing. Writer assessed for safety by providing, collecting, and reviewing the morning check in forms. Writer encouraged pts to share about recent or upcoming events, to include stressors or positive developments. Writer encouraged peer feedback and discussion through open ended questions and therapeutic reflection.   Attendees: 7       Patient's Goal:  Pt will continue to practice disclosing safety concerns and engaging with peer support.      Notes: Pt engaged in the group evidenced by spontaneous participation. Pt completed the check-in sheet and denied any safety concerns.  Pt shared that she has been house sitting and was scared last night but has thought about different things that she can do to help her anxiety. Pt will continue to practice disclosing safety concerns and engaging with peer support.     Status After Intervention:  Unchanged    Participation Level: Active Listener and Interactive    Participation Quality: Appropriate and Sharing      Speech:  normal      Thought Process/Content: Logical      Affective Functioning: Congruent      Mood: euthymic      Level of consciousness:  Alert and Oriented x4      Response to Learning: Able to verbalize current knowledge/experience and Progressing to goal      Endings: None Reported    Modes of Intervention: Support      Discipline Responsible: /Counselor      Signature:  Abbie Llamas LCSW
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
## 2025-06-27 NOTE — GROUP NOTE
Group Therapy Note    Date: 6/27/2025    Group Start Time:  2:00 PM  Group End Time:  2:45 PM  Group Topic: Wrap-Up    [facility]    Tala Tanner MSW        Group Therapy Note    Patients were given wrap up sheets to assess for mood, SI, and HI. Patients were given safety plans to review for the weekend. Patients identified learning from the day. Patients were asked to offer feedback and reflections for peer discharging. Patients were led through a progressive muscle relaxation and were asked to reflect on feelings afterward.    Attendees: 7       Patient's Goal:  Complete wrap up sheet, reflect on learning, engage in relaxation technique    Notes:  Pt engaged actively in wrap up group. Pt identified no SI/HI on wrap up sheet. Pt offered feedback and support to peer discharging. Pt reflected strengths and connection to peers. Pt engaged in PMR and identified feeling relaxed. Pt will continue to practice reflecting and using relaxation skills.    Status After Intervention:  Improved    Participation Level: Active Listener and Interactive    Participation Quality: Appropriate, Attentive, Sharing, and Supportive      Speech:  normal      Thought Process/Content: Logical  Linear      Affective Functioning: Congruent      Mood: euthymic      Level of consciousness:  Alert, Oriented x4, and Attentive      Response to Learning: Able to verbalize current knowledge/experience, Able to verbalize/acknowledge new learning, Capable of insight, and Progressing to goal      Endings: None Reported    Modes of Intervention: Exploration and Activity      Discipline Responsible: /Counselor      Signature:  MIESHA Garrison
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
## 2025-07-01 ENCOUNTER — HOSPITAL ENCOUNTER (OUTPATIENT)
Facility: HOSPITAL | Age: 25
Setting detail: RECURRING SERIES
Discharge: HOME | End: 2025-07-04
Payer: MEDICAID

## 2025-07-01 VITALS
OXYGEN SATURATION: 100 % | SYSTOLIC BLOOD PRESSURE: 132 MMHG | TEMPERATURE: 98.4 F | DIASTOLIC BLOOD PRESSURE: 93 MMHG | HEART RATE: 77 BPM

## 2025-07-01 PROCEDURE — 90832 PSYTX W PT 30 MINUTES: CPT

## 2025-07-01 PROCEDURE — 90853 GROUP PSYCHOTHERAPY: CPT

## 2025-07-01 NOTE — GROUP NOTE
Group Therapy Note    Date: 7/1/2025    Group Start Time:  9:40 AM  Group End Time: 10:30 AM  Group Topic: Process Group - Outpatient    [facility]    Yuliana Burger LCSW        Group Therapy Note    Attendees: 6  Writer facilitated process group. Writer encouraged pts to share issues on which they want support with problem solving, issues they need to process with peers, or events they want to celebrate. Writer prompted and supported peer feedback. Writer provided validation and feedback as well as utilized open ended questions to support further exploration of topics.       Patient's Goal:  Pt will continue to practice engaging in healthy processing as well as peer support.       Notes:  Patient engaged well in group this morning. She  presented as attentive to and interactive with peers.. Patients will continue with identified treatment goals in further groups.            Status After Intervention:  Improved    Participation Level: Active Listener and Interactive    Participation Quality: Appropriate, Attentive, Sharing, and Supportive      Speech:  normal      Thought Process/Content: Logical      Affective Functioning: Congruent      Mood: euthymic      Level of consciousness:  Alert, Oriented x4, and Attentive      Response to Learning: Able to verbalize current knowledge/experience, Able to verbalize/acknowledge new learning, and Able to retain information      Endings: None Reported    Modes of Intervention: Education, Support, Socialization, and Exploration      Discipline Responsible: /Counselor      Signature:  Yuliana Burger LCSW

## 2025-07-01 NOTE — PROGRESS NOTES
MEDICATION GROUP THERAPY PROGRESS NOTE      Kaylee Berrios was present for medication group.    TOPIC: Medication adherence    GROUP TIME: 1:10 - 2:00 PM Tuesday    PERSONAL GOAL FOR PARTICIPATION: To be present for group, participate in discussion, and answer patient-directed questions.    GOAL ORIENTATION: Personal    THERAPEUTIC INTERVENTIONS REVIEWED AND DISCUSSED: The following topics were presented: common barriers to taking medications including but not limited to cost, transportation, complexity of regimen, lack of knowledge about medication regimen;  beliefs regarding medications; strategies on how to overcome barriers and beliefs regarding medications in order to improve medication adherence. Patients were given time to ask questions regarding their current therapy.    IMPRESSION OF PARTICIPATION: Myrna was present for about half of the group, leaving group for unknown reason.  When present, actively participated in discussions.  Shared a story of helping a peer having a panic attack and how that gave her a different perspective.       Karla Oliver [facility]  Clinical Pharmacy Specialist, Behavioral Health  Desk: [phone]   Pharmacy: [phone]

## 2025-07-01 NOTE — GROUP NOTE
Group Therapy Note    Date: 7/1/2025    Group Start Time: 12:00 PM  Group End Time:  1:00 PM  Group Topic: Psychoeducation    [facility] Abbie Willingham LCSW        Group Therapy Note  Writer facilitated a treatment planning group focusing on social support. Pt had pts explore what their social support looks like, characteristics of their social support and barriers to them increasing their social support.   Attendees: 6         Patient's Goal:  Pt will continue to explore and increase their social support.     Notes: Pt engaged in the group evidenced by appropriate eye contact. Pt explored her social support and how she has learned how her friends support her and that just because they don't respond right away doesn't mean they hate her. Pt explored that she can increase her social support by challenging her negative thoughts. Pt will continue to explore and increase their social support.     Status After Intervention:  Unchanged    Participation Level: Active Listener and Interactive    Participation Quality: Appropriate and Sharing      Speech:  normal      Thought Process/Content: Logical      Affective Functioning: Congruent      Mood: euthymic      Level of consciousness:  Alert and Oriented x4      Response to Learning: Able to verbalize current knowledge/experience and Progressing to goal      Endings: None Reported    Modes of Intervention: Exploration and Clarifying      Discipline Responsible: /Counselor      Signature:  Abbie Llamas LCSW
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
## 2025-07-01 NOTE — BH NOTE
Partial Hospitalization Program Individual Psychotherapy Note      Diagnosis: F43.1    Goal: individual counseling, discharge planning    Psychotherapy Session    Start time: 1:12pm  Stop time: 1:30pm    Patient Mental Status and Mood/Affect:Calm and Congruent    Patient Behavior and Appearance: Cooperativeshows no evidence of impairment    Intervention/Techniques: Validated/Supported, Reflected, Listened/Empathized, Observed/Monitored, Queired/Probed, and Provided Feedback    Focus of Session/Patient Response and Progress Towards Goal: Writer and patient met for individual counseling and discharge planning/treatment plan update. Patient presented as alert and oriented x4, declined SI/HI or AH/VH. She reflected feeling significant progress on her treatment goals.     Narrative:     Writer met with patient for final individual counseling session, treatment plan closure, and discussion of discharge resources. At this time, patient expressed being in agreement with discharge this week on 7/3 (due to holiday closure on 7/4). Due to her upcoming change in life circumstances (new job and apprenticeship, splitting time between two locations) she requested to only be provided with resources, not appointments, for her discharge. Writer acknowledged this and encouraged that patient should call staff here if she has questions or changes her mind after discharge and wants to be set up with any appointments. Patient verbalized understanding.     Writer reviewed treatment plan goals with patient. She reports significant progress on/feeling like she has completed STG 2, noting that she has not vaped in over a week. She reports feeling positively about this, and feeling like she has gotten through the most difficult part of withdrawal. She expressed feeling like this will be helpful for her anxiety, both with regard to nicotine and in regard to feeling anxious about being short of breath.     She reports feeling like she has
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
## 2025-07-01 NOTE — GROUP NOTE
Group Therapy Note    Date: 7/1/2025    Group Start Time:  9:00 AM  Group End Time:  9:40 AM  Group Topic: Community Meeting    [facility]    Carina Garcia MSW        Group Therapy Note    Attendees: 6 in person, 1 virtual    Writer facilitated community check in group this morning. Writer opened by providing patients attending in person their lunch tickets for today and tomorrow (due to closure yesterday) as well as their check in sheets and asked them to complete both. Writer then checked in with patients to see how their weekends were and asked them to choose a word to identify how they are feeling today.        Patient's Goal:  Participate in group therapy, complete check in sheet, build rapport with peers.       Notes:  Patient engaged well in group this morning. She completed her check in sheet, declining any safety concerns. Patient shared about her weekend and how she has not vaped in a week, which was a goal for her in coming to treatment. She reflected about feeling fidgety. Patient will continue with identified treatment goals in further groups.       Status After Intervention:  Unchanged    Participation Level: Active Listener and Interactive    Participation Quality: Appropriate, Attentive, and Sharing      Speech:  normal      Thought Process/Content: Logical      Affective Functioning: Congruent      Mood: anxious      Level of consciousness:  Alert and Oriented x4      Response to Learning: Able to verbalize current knowledge/experience, Capable of insight, and Progressing to goal      Endings: None Reported    Modes of Intervention: Support, Socialization, and Exploration      Discipline Responsible: /Counselor      Signature:  MIESHA Perez

## 2025-07-01 NOTE — GROUP NOTE
Group Therapy Note    Date: 7/1/2025    Group Start Time: 10:40 AM  Group End Time: 11:30 AM  Group Topic: Cognitive Skills    [facility]    Tala Tanner MSW        Group Therapy Note    Patients were asked to engage in a back-to-back drawing exercise. Patients were asked to describe images of shapes for peers to draw. Patients were encouraged to ask clarifying questions. Patients were asked to reflect on communication skills used and to generalize to other situations.    Attendees: 7       Patient's Goal:  Engage in activity, use effective communication skills    Notes:  Pt engaged actively in cognitive skills group. Pt engaged actively in drawing activity. Pt volunteered to describe an image for peers to draw. Pt identified feelings of anxiety. Pt asked clarifying questions to peers. Pt identified a tendency to \"overexplain\". Pt discussed ways different styles of communication may work better or worse depending on the audience/how well they know us. Pt will continue to practice using effective communication skills.    Status After Intervention:  Unchanged    Participation Level: Active Listener and Interactive    Participation Quality: Appropriate, Attentive, and Sharing      Speech:  normal      Thought Process/Content: Logical  Linear      Affective Functioning: Congruent      Mood: anxious and euthymic      Level of consciousness:  Alert, Oriented x4, and Attentive      Response to Learning: Able to verbalize current knowledge/experience, Capable of insight, and Progressing to goal      Endings: None Reported    Modes of Intervention: Education and Activity      Discipline Responsible: /Counselor      Signature:  MIESHA Garrison

## 2025-07-02 ENCOUNTER — HOSPITAL ENCOUNTER (OUTPATIENT)
Facility: HOSPITAL | Age: 25
Setting detail: RECURRING SERIES
Discharge: HOME | End: 2025-07-05
Payer: MEDICAID

## 2025-07-02 VITALS
HEART RATE: 85 BPM | TEMPERATURE: 97.7 F | DIASTOLIC BLOOD PRESSURE: 88 MMHG | OXYGEN SATURATION: 99 % | SYSTOLIC BLOOD PRESSURE: 133 MMHG

## 2025-07-02 PROCEDURE — 90853 GROUP PSYCHOTHERAPY: CPT

## 2025-07-02 NOTE — GROUP NOTE
Group Therapy Note    Date: 7/2/2025    Group Start Time:  1:10 PM  Group End Time:  2:00 PM  Group Topic: Psychoeducation    [facility]    Yuliana Burger, ALICJAW        Group Therapy Note    Attendees: 5  Writer facilitated group centered around reducing social isolation.Writer opened with open discussion around the risk factors and effects on mental health. Writer then asked patients to engage in a brief kain talk as it relates to tips for reducing isolation and provided the information sheet. Writer reviewed the information and provided time and space for patients to reflect and discuss how they might stay connected and less likely to experience loneliness and isolation.       Patient's Goal:  To participate in group therapy, connect with peers and explore effective ways she can reduce feelings of loneliness.     Notes: Patient engaged well in group this afternoon. She presented as open and interactive with peers, and attentive to the information as evidenced by appropriate eye contact and nodding. Patient asked good clarifying questions and shared about opportunities she can create to ensure meaningful  social interactions daily. Patient will continue with identified treatment goals in further groups.            Status After Intervention:  Improved    Participation Level: Active Listener and Interactive    Participation Quality: Appropriate, Attentive, Sharing, and Supportive      Speech:  normal      Thought Process/Content: Logical      Affective Functioning: Congruent      Mood: euthymic      Level of consciousness:  Alert, Oriented x4, and Attentive      Response to Learning: Able to verbalize current knowledge/experience, Able to verbalize/acknowledge new learning, Able to retain information, and Capable of insight      Endings: None Reported    Modes of Intervention: Education, Support, Socialization, Exploration, and 
                                                                      Group Therapy Note    Date: 7/2/2025    Group Start Time:  2:00 PM  Group End Time:  2:45 PM  Group Topic: Wrap-Up    [facility]    Abbie Llamas LCSW        Group Therapy Note   Writer facilitated a community wrap up group focused on assessing for safety, coping skills practice, and planning ways to engage in healthy coping while in the community. Writer facilitated a symptom and safety check in using the afternoon check in form. Pts then engaged in a sensory walk and identified a positive intention for the rest of their day.   Attendees: 5       Patient's Goal:  Pt will continue to practice disclosing safety concerns and identify positive intentions.       Notes:  Pt engaged in the group evidenced by appropriate eye contact. Pt completed the wrap up sheet and denied any safety concerns. Pt engaged in the sensory walk and shared her thoughts with the group. Pt reported that she was going to go home and do some work since it was not so hot out. Pt will continue to practice disclosing safety concerns and identify positive intentions.       Status After Intervention:  Unchanged    Participation Level: Active Listener and Interactive    Participation Quality: Appropriate and Sharing      Speech:  normal      Thought Process/Content: Logical      Affective Functioning: Congruent      Mood: euthymic      Level of consciousness:  Alert and Oriented x4      Response to Learning: Able to verbalize current knowledge/experience and Progressing to goal      Endings: None Reported    Modes of Intervention: Support and Exploration      Discipline Responsible: /Counselor      Signature:  Abbie Llamas LCSW    
                                                                      Group Therapy Note    Date: 7/2/2025    Group Start Time:  9:00 AM  Group End Time:  9:40 AM  Group Topic: Community Meeting    [facility]    Carina Garcia MSW        Group Therapy Note    Attendees: 5    Writer facilitated community check in group this morning. Writer opened by providing patients with lunch orders and check in sheets and encouraging them to fill both out. Writer then checked in with patients and facilitated reflection and discussion for the remainder of group.        Patient's Goal:  Participate in group therapy, complete check in sheet, build rapport with peers.       Notes:  Patient engaged well in group this morning. She completed her check in sheet, declining any safety concerns. Patient came in a little late but was present for more than half the time. She shared about her own experience and demonstrated empathy and insight in supporting a peer processing a similar experience. Patient will continue with identified treatment goals in further groups.       Status After Intervention:  Unchanged    Participation Level: Active Listener and Interactive    Participation Quality: Appropriate, Attentive, Sharing, and Supportive      Speech:  normal      Thought Process/Content: Logical      Affective Functioning: Congruent      Mood: euthymic      Level of consciousness:  Alert and Oriented x4      Response to Learning: Able to verbalize current knowledge/experience, Capable of insight, and Progressing to goal      Endings: None Reported    Modes of Intervention: Support, Socialization, and Exploration      Discipline Responsible: /Counselor      Signature:  MIESHA Perez    
                                                                      Group Therapy Note    Date: 7/2/2025    Group Start Time:  9:40 AM  Group End Time: 10:30 AM  Group Topic: Process Group - Outpatient    [facility]    Tala Tanner MSW        Group Therapy Note    Patients were given space and time to openly process thoughts, feelings, and experiences with peers. Patients were encouraged to provide feedback and support to peers. Patients were asked to identify methods of grounding and staying present during times of shut-down and disconnection.    Attendees: 5       Patient's Goal:  Process emotions and experiences, support peers, identify grounding skills    Notes:  Pt engaged actively in process group. Pt offered feedback and support to peers sharing. Pt discussed family relationships and encouraged peers to use communication skills. Pt identified ways to use grounding skills to stay present and focused during times of stress. Pt will continue to practice reflecting and using coping skills.    Status After Intervention:  Unchanged    Participation Level: Active Listener and Interactive    Participation Quality: Appropriate, Attentive, Sharing, and Supportive      Speech:  normal      Thought Process/Content: Logical  Linear      Affective Functioning: Congruent      Mood: euthymic      Level of consciousness:  Alert, Oriented x4, and Attentive      Response to Learning: Able to verbalize current knowledge/experience, Capable of insight, and Progressing to goal      Endings: None Reported    Modes of Intervention: Support      Discipline Responsible: /Counselor      Signature:  MIESHA Garrison    
                                                                      Group Therapy Note    Date: 7/2/2025    Group Start Time: 10:40 AM  Group End Time: 11:30 AM  Group Topic: Cognitive Skills    [facility]    Abbie Llamas LCSW        Group Therapy Note  Writer facilitated a cognitive skills group focusing on self-compassion. Pts explore their negative thinking patterns and negative core beliefs. Writer then shared a media clip exploring self-compassion and encouraged pts to engage in a group reflection.   Attendees: 5       Patient's Goal:  Pt will continue to increase insight and understanding of self-compassion.     Notes: Pt was out for most of the group and was not present for the media clip. When pt returned she was able to provide support and feedback to peers engaging in a reflection. Pt will continue to increase insight and understanding of self-compassion.     Status After Intervention:  Unchanged    Participation Level: Interactive    Participation Quality: Appropriate      Speech:  normal      Thought Process/Content: Logical      Affective Functioning: Congruent      Mood: euthymic      Level of consciousness:  Oriented x4      Response to Learning: Progressing to goal      Endings: None Reported    Modes of Intervention: Education and Exploration      Discipline Responsible: /Counselor      Signature:  Abbie Llamas LCSW    
                                                                      Group Therapy Note    Date: 7/2/2025    Group Start Time: 12:00 PM  Group End Time:  1:00 PM  Group Topic: Psychotherapy    [facility]    Tala Tanner MSW        Group Therapy Note    Patients were asked to engage in an emotions charades activity. Patients chose random emotions to act out for peers. Patients were encouraged to reflect on overall experiences of body language. Patients were encouraged to support peers sharing emotions and stressors.    Attendees: 5       Patient's Goal:  Engage in group activity, practice body language, support peers    Notes:  Pt engaged actively in psychotherapy group. Pt engaged in body language activity. Pt acted out \"anxious\", \"relieved\", and \"misunderstood\" for peers to guess. Pt identified emotions conveyed by peers. Pt offered feedback and support to peer discussing family stressors. Pt encouraged peers to use communication skills. Pt discussed ways she responds to anxiety and demonstrates pride. Pt will continue to practice reflecting and using communication skills.    Status After Intervention:  Unchanged    Participation Level: Active Listener and Interactive    Participation Quality: Appropriate, Attentive, Sharing, and Supportive      Speech:  normal      Thought Process/Content: Logical  Linear      Affective Functioning: Congruent      Mood: euthymic      Level of consciousness:  Alert, Oriented x4, and Attentive      Response to Learning: Able to verbalize current knowledge/experience, Capable of insight, and Progressing to goal      Endings: None Reported    Modes of Intervention: Support and Activity      Discipline Responsible: /Counselor      Signature:  MIESHA Garrison    
Speaking Coherently

## 2025-07-03 ENCOUNTER — HOSPITAL ENCOUNTER (OUTPATIENT)
Facility: HOSPITAL | Age: 25
Setting detail: RECURRING SERIES
Discharge: HOME | End: 2025-07-06
Payer: MEDICAID

## 2025-07-03 VITALS
DIASTOLIC BLOOD PRESSURE: 84 MMHG | HEART RATE: 84 BPM | TEMPERATURE: 98 F | OXYGEN SATURATION: 99 % | SYSTOLIC BLOOD PRESSURE: 121 MMHG

## 2025-07-03 DIAGNOSIS — F06.4 ANXIETY DISORDER DUE TO KNOWN PHYSIOLOGICAL CONDITION: Primary | ICD-10-CM

## 2025-07-03 PROCEDURE — 90853 GROUP PSYCHOTHERAPY: CPT

## 2025-07-03 RX ORDER — LORAZEPAM 0.5 MG/1
0.5 TABLET ORAL DAILY PRN
Qty: 10 TABLET | Refills: 0 | Status: SHIPPED | OUTPATIENT
Start: 2025-07-03 | End: 2025-07-13

## 2025-07-03 NOTE — GROUP NOTE
Group Therapy Note    Date: 7/3/2025    Group Start Time: 10:40 AM  Group End Time: 11:30 AM  Group Topic: Cognitive Skills    [facility]    Tala Tanner MSW        Group Therapy Note    Patients were shown a video related to media depictions of growth/change, perspective taking, and grief. Patients were asked to share thoughts, reflections, and things they relate to. Patients were encouraged to think about ways change impacts relationships and how perspective-taking impacts communication.    Attendees: 5       Patient's Goal:  Engage with video content, reflect on change and perspective, connect with peers    Notes:  Pt engaged in cognitive skills group. Pt listened actively to video, looking and reacting appropriately. Pt reflected on ways her relationships have changed over time. Pt discussed treating others with non-judgment. Pt will continue to practice reflecting.    Status After Intervention:  Unchanged    Participation Level: Active Listener and Interactive    Participation Quality: Appropriate, Attentive, and Sharing      Speech:  normal      Thought Process/Content: Logical  Linear      Affective Functioning: Congruent      Mood: euthymic      Level of consciousness:  Alert, Oriented x4, and Attentive      Response to Learning: Able to verbalize current knowledge/experience, Capable of insight, and Progressing to goal      Endings: None Reported    Modes of Intervention: Exploration and Media      Discipline Responsible: /Counselor      Signature:  MIESHA Garrison

## 2025-07-03 NOTE — GROUP NOTE
Group Therapy Note    Date: 7/3/2025    Group Start Time:  9:40 AM  Group End Time: 10:30 AM  Group Topic: Process Group - Outpatient    [facility]    Carina Garcia MSW        Group Therapy Note    Attendees: 6 in person, 1 virtual (7 total)    Writer facilitated process group this morning. Writer opened by prompting patients to share about anything bothering them, and facilitated resulting reflection and discussion for the remainder of group.        Patient's Goal:  Participate in group therapy, engage in open sharing and processing with peers, practice building insight and self-awareness      Notes:  Patient engaged well in group this morning. She demonstrated good insight in reflecting and providing feedback to peers, and presented as overall bright, attentive, and supportive. She shared about her own experiences and appropriately used empathy. Patient will continue with identified treatment goals in further groups.       Status After Intervention:  Unchanged    Participation Level: Active Listener and Interactive    Participation Quality: Appropriate, Attentive, Sharing, and Supportive      Speech:  normal      Thought Process/Content: Logical      Affective Functioning: Congruent      Mood: euthymic      Level of consciousness:  Alert and Oriented x4      Response to Learning: Able to verbalize current knowledge/experience, Capable of insight, and Progressing to goal      Endings: None Reported    Modes of Intervention: Support, Socialization, and Exploration      Discipline Responsible: /Counselor       Signature:  MIESHA Perez

## 2025-07-03 NOTE — BH NOTE
OUTPATIENT PHYSICIAN PROGRESS NOTE      Chief Complaint/Symptoms/Impairments (as noted in Treatment Plan):   I needed a refill on my Ativan.     Criteria for Continued Treatment (check all that apply):   [x] Preventing Decompensation   [x] Improving Level of Functioning  [] Reducing Isolative Behaviors  [] Understanding Diagnosis and Need for Medications  [] Improving Treatment/Medication Compliance  [] Confronting Denial of Illness  [x] Stabilizing Level of Functioning  [] Improving Emotional/Social/Cognitive Functioning  [] Decreasing Frequency of Hospitalizations    Suicidal/Homicidal ideations:   [x] Absent  [] Present  [] Passive  [] Intent  [] Plan  [] Death Wishes      CURRENT CONDITION OF PATIENT & PROGRESS ON TREATMENT PLAN    Subjective (ongoing complaints by patient regarding symptom severity, presentation, affect, function, etc.):   Kaylee reports feeling better with her participation in PHP. Says her anxiety has improved but still can be \"intense at times\". She is taking half a tablet of Ativan which she takes once or twice a month. Participates actively in the milieu. At the present time the patient Kaylee Berrios remains compliant with taking medications. Denies any adverse events from taking them and feels they have been beneficial.       Behavior (side effects, changes in mental status, objective observations seen in individual sessions or by staff):   MENTAL STATUS EXAM ON DISCHARGE:    General appearance:   Kaylee Berrios is a 24 y.o. White (non-) female who is well groomed, psychomotor activity is WNL  Eye contact: makes good eye contact  Speech: Spontaneous and coherent  Affect : Euthymic  Mood: \"OK\"  Thought Process: Logical, goal directed  Perception: Denies any AH or VH.   Thought Content: Denies any SI or Plan  Insight: Partial  Judgement: Fair  Cognition: Intact grossly.    Assessment/Plan (functional improvement and/or ongoing impairment, response to treatment, plan for future

## 2025-07-03 NOTE — GROUP NOTE
Group Therapy Note    Date: 7/3/2025    Group Start Time: 12:00 PM  Group End Time:  1:00 PM  Group Topic: Psychotherapy    [facility]    Carina Garcia MSW        Group Therapy Note    Attendees: 6    Writer facilitated therapy group this afternoon focused around creative expression and affirmations. Writer explained to patients that they would be creating an \"affirmations yearbook\" sheet, in which they decorate the frame with things/colors that bring them bubba, then pass around the paper and their peers would fill out affirmations for them in the middle. Writer emphasized the goal of the activity as increasing positive experiences and helping to increase positive self image.        Patient's Goal:  Participate in group therapy, engage in creative expression of positive emotions, practice skill of giving affirmations.     Notes:  Patient engaged well in group this afternoon. Writer observed her participating well in the activity, and interacting appropriately with peers throughout the session. She provided good reflections and feedback for peers throughout the group. Patient will continue with identified treatment goals in further groups.       Status After Intervention:  Unchanged    Participation Level: Active Listener and Interactive    Participation Quality: Appropriate, Attentive, and Supportive      Speech:  normal      Thought Process/Content: Logical      Affective Functioning: Congruent      Mood: euthymic      Level of consciousness:  Alert and Oriented x4      Response to Learning: Able to verbalize current knowledge/experience, Capable of insight, and Progressing to goal      Endings: None Reported    Modes of Intervention: Socialization, Exploration, and Activity      Discipline Responsible: /Counselor      Signature:  MIESHA Perez
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
## 2025-07-03 NOTE — GROUP NOTE
Group Therapy Note    Date: 7/3/2025    Group Start Time:  2:00 PM  Group End Time:  2:45 PM  Group Topic: Wrap-Up    [facility]    Abbie Llamas LCSW        Group Therapy Note   Writer facilitated a community wrap up group focused on assessing for safety, coping skills practice, and planning ways to engage in healthy coping while in the community. Writer facilitated a symptom and safety check in using the afternoon check in form. Writer then encouraged pts to create an intention for the long weekend.  Attendees: 6       Patient's Goal:  Pt will continue to practice disclosing safety concerns and planning for safety in the community.      Notes: Pt engaged in the group evidenced by spontaneous participation. Pt completed the wrap-up form and denied any safety concerns. Pt stated that she was going to try and be mindful with the down time that she will have before starting her work. Pt will continue to practice disclosing safety concerns and planning for safety in the community.      Status After Intervention:  Unchanged    Participation Level: Active Listener and Interactive    Participation Quality: Appropriate and Sharing      Speech:  normal      Thought Process/Content: Logical      Affective Functioning: Congruent      Mood: euthymic      Level of consciousness:  Alert and Oriented x4      Response to Learning: Able to verbalize current knowledge/experience and Progressing to goal      Endings: None Reported    Modes of Intervention: Support      Discipline Responsible: /Counselor      Signature:  Abbie Llamas LCSW

## 2025-07-03 NOTE — GROUP NOTE
Group Therapy Note    Date: 7/3/2025    Group Start Time:  9:00 AM  Group End Time:  9:40 AM  Group Topic: Community Meeting    [facility]    Abbie Llamas LCSW        Group Therapy Note  Writer facilitated community group with a focus on assessing safety and pt processing. Writer assessed for safety by providing, collecting, and reviewing the morning check in forms. Writer encouraged pts to share about recent or upcoming events, to include stressors or positive developments. Writer encouraged peer feedback and discussion through open ended questions and therapeutic reflection.    Attendees: 6       Patient's Goal:  Pt will continue to practice disclosing safety concerns and engaging with peer support.     Notes: Pt engaged in the group evidenced by appropriate eye contact. Pt completed the check in sheet and denied any safety concerns. Pt listened to her peers as they shared and was able to provide support and feedback. Pt will continue to practice disclosing safety concerns and engaging with peer support.     Status After Intervention:  Unchanged    Participation Level: Active Listener and Interactive    Participation Quality: Appropriate and Supportive      Speech:  normal      Thought Process/Content: Logical      Affective Functioning: Congruent      Mood: euthymic      Level of consciousness:  Oriented x4      Response to Learning: Progressing to goal      Endings: None Reported    Modes of Intervention: Support      Discipline Responsible: /Counselor      Signature:  Abbie Llamas LCSW

## 2025-07-07 ENCOUNTER — APPOINTMENT (OUTPATIENT)
Facility: HOSPITAL | Age: 25
End: 2025-07-07
Payer: MEDICAID

## 2025-07-08 ENCOUNTER — APPOINTMENT (OUTPATIENT)
Facility: HOSPITAL | Age: 25
End: 2025-07-08
Payer: MEDICAID

## 2025-07-09 ENCOUNTER — APPOINTMENT (OUTPATIENT)
Facility: HOSPITAL | Age: 25
End: 2025-07-09
Payer: MEDICAID

## 2025-07-10 ENCOUNTER — APPOINTMENT (OUTPATIENT)
Facility: HOSPITAL | Age: 25
End: 2025-07-10
Payer: MEDICAID

## 2025-07-11 ENCOUNTER — APPOINTMENT (OUTPATIENT)
Facility: HOSPITAL | Age: 25
End: 2025-07-11
Payer: MEDICAID

## 2025-07-14 ENCOUNTER — APPOINTMENT (OUTPATIENT)
Facility: HOSPITAL | Age: 25
End: 2025-07-14
Payer: MEDICAID

## 2025-07-15 ENCOUNTER — APPOINTMENT (OUTPATIENT)
Facility: HOSPITAL | Age: 25
End: 2025-07-15
Payer: MEDICAID

## 2025-07-16 ENCOUNTER — APPOINTMENT (OUTPATIENT)
Facility: HOSPITAL | Age: 25
End: 2025-07-16
Payer: MEDICAID

## 2025-07-17 ENCOUNTER — APPOINTMENT (OUTPATIENT)
Facility: HOSPITAL | Age: 25
End: 2025-07-17
Payer: MEDICAID

## 2025-07-18 ENCOUNTER — APPOINTMENT (OUTPATIENT)
Facility: HOSPITAL | Age: 25
End: 2025-07-18
Payer: MEDICAID

## 2025-07-21 ENCOUNTER — APPOINTMENT (OUTPATIENT)
Facility: HOSPITAL | Age: 25
End: 2025-07-21
Payer: MEDICAID

## 2025-07-22 ENCOUNTER — APPOINTMENT (OUTPATIENT)
Facility: HOSPITAL | Age: 25
End: 2025-07-22
Payer: MEDICAID

## 2025-07-23 ENCOUNTER — APPOINTMENT (OUTPATIENT)
Facility: HOSPITAL | Age: 25
End: 2025-07-23
Payer: MEDICAID

## 2025-07-24 ENCOUNTER — APPOINTMENT (OUTPATIENT)
Facility: HOSPITAL | Age: 25
End: 2025-07-24
Payer: MEDICAID

## 2025-07-25 ENCOUNTER — APPOINTMENT (OUTPATIENT)
Facility: HOSPITAL | Age: 25
End: 2025-07-25
Payer: MEDICAID

## 2025-07-28 ENCOUNTER — APPOINTMENT (OUTPATIENT)
Facility: HOSPITAL | Age: 25
End: 2025-07-28
Payer: MEDICAID

## 2025-07-29 ENCOUNTER — APPOINTMENT (OUTPATIENT)
Facility: HOSPITAL | Age: 25
End: 2025-07-29
Payer: MEDICAID

## 2025-07-30 ENCOUNTER — APPOINTMENT (OUTPATIENT)
Facility: HOSPITAL | Age: 25
End: 2025-07-30
Payer: MEDICAID

## 2025-07-31 ENCOUNTER — APPOINTMENT (OUTPATIENT)
Facility: HOSPITAL | Age: 25
End: 2025-07-31
Payer: MEDICAID